# Patient Record
Sex: MALE | Race: BLACK OR AFRICAN AMERICAN | Employment: FULL TIME | URBAN - METROPOLITAN AREA
[De-identification: names, ages, dates, MRNs, and addresses within clinical notes are randomized per-mention and may not be internally consistent; named-entity substitution may affect disease eponyms.]

---

## 2017-02-06 ENCOUNTER — HOSPITAL ENCOUNTER (EMERGENCY)
Age: 42
Discharge: HOME | End: 2017-02-06
Attending: EMERGENCY MEDICINE
Payer: SELF-PAY

## 2017-02-06 ENCOUNTER — APPOINTMENT (OUTPATIENT)
Dept: GENERAL RADIOLOGY | Age: 42
End: 2017-02-06
Attending: EMERGENCY MEDICINE
Payer: SELF-PAY

## 2017-02-06 VITALS
BODY MASS INDEX: 54.12 KG/M2 | OXYGEN SATURATION: 98 % | HEART RATE: 91 BPM | SYSTOLIC BLOOD PRESSURE: 151 MMHG | DIASTOLIC BLOOD PRESSURE: 92 MMHG | RESPIRATION RATE: 18 BRPM | WEIGHT: 294.09 LBS | HEIGHT: 62 IN | TEMPERATURE: 98.4 F

## 2017-02-06 DIAGNOSIS — R42 VERTIGO: ICD-10-CM

## 2017-02-06 DIAGNOSIS — R09.81 SINUS CONGESTION: ICD-10-CM

## 2017-02-06 DIAGNOSIS — J20.9 ACUTE BRONCHITIS, UNSPECIFIED ORGANISM: Primary | ICD-10-CM

## 2017-02-06 LAB
ALBUMIN SERPL BCP-MCNC: 4.3 G/DL (ref 3.5–5)
ALBUMIN/GLOB SERPL: 1.3 {RATIO} (ref 1.1–2.2)
ALP SERPL-CCNC: 70 U/L (ref 45–117)
ALT SERPL-CCNC: 31 U/L (ref 12–78)
ANION GAP BLD CALC-SCNC: 8 MMOL/L (ref 5–15)
AST SERPL W P-5'-P-CCNC: 18 U/L (ref 15–37)
ATRIAL RATE: 86 BPM
BASOPHILS # BLD AUTO: 0 K/UL (ref 0–0.1)
BASOPHILS # BLD: 0 % (ref 0–1)
BILIRUB SERPL-MCNC: 0.3 MG/DL (ref 0.2–1)
BUN SERPL-MCNC: 9 MG/DL (ref 6–20)
BUN/CREAT SERPL: 8 (ref 12–20)
CALCIUM SERPL-MCNC: 8.7 MG/DL (ref 8.5–10.1)
CALCULATED P AXIS, ECG09: 44 DEGREES
CALCULATED R AXIS, ECG10: -54 DEGREES
CALCULATED T AXIS, ECG11: 30 DEGREES
CHLORIDE SERPL-SCNC: 105 MMOL/L (ref 97–108)
CO2 SERPL-SCNC: 27 MMOL/L (ref 21–32)
CREAT SERPL-MCNC: 1.15 MG/DL (ref 0.7–1.3)
DIAGNOSIS, 93000: NORMAL
EOSINOPHIL # BLD: 0.1 K/UL (ref 0–0.4)
EOSINOPHIL NFR BLD: 1 % (ref 0–7)
ERYTHROCYTE [DISTWIDTH] IN BLOOD BY AUTOMATED COUNT: 13.9 % (ref 11.5–14.5)
GLOBULIN SER CALC-MCNC: 3.3 G/DL (ref 2–4)
GLUCOSE SERPL-MCNC: 90 MG/DL (ref 65–100)
HCT VFR BLD AUTO: 45.3 % (ref 36.6–50.3)
HGB BLD-MCNC: 14.6 G/DL (ref 12.1–17)
LYMPHOCYTES # BLD AUTO: 29 % (ref 12–49)
LYMPHOCYTES # BLD: 1.4 K/UL (ref 0.8–3.5)
MAGNESIUM SERPL-MCNC: 2.4 MG/DL (ref 1.6–2.4)
MCH RBC QN AUTO: 29.6 PG (ref 26–34)
MCHC RBC AUTO-ENTMCNC: 32.2 G/DL (ref 30–36.5)
MCV RBC AUTO: 91.7 FL (ref 80–99)
MONOCYTES # BLD: 0.5 K/UL (ref 0–1)
MONOCYTES NFR BLD AUTO: 10 % (ref 5–13)
NEUTS SEG # BLD: 3 K/UL (ref 1.8–8)
NEUTS SEG NFR BLD AUTO: 60 % (ref 32–75)
P-R INTERVAL, ECG05: 170 MS
PLATELET # BLD AUTO: 212 K/UL (ref 150–400)
POTASSIUM SERPL-SCNC: 4.2 MMOL/L (ref 3.5–5.1)
PROT SERPL-MCNC: 7.6 G/DL (ref 6.4–8.2)
Q-T INTERVAL, ECG07: 364 MS
QRS DURATION, ECG06: 100 MS
QTC CALCULATION (BEZET), ECG08: 435 MS
RBC # BLD AUTO: 4.94 M/UL (ref 4.1–5.7)
SODIUM SERPL-SCNC: 140 MMOL/L (ref 136–145)
VENTRICULAR RATE, ECG03: 86 BPM
WBC # BLD AUTO: 4.9 K/UL (ref 4.1–11.1)

## 2017-02-06 PROCEDURE — 99283 EMERGENCY DEPT VISIT LOW MDM: CPT

## 2017-02-06 PROCEDURE — 96360 HYDRATION IV INFUSION INIT: CPT

## 2017-02-06 PROCEDURE — 83735 ASSAY OF MAGNESIUM: CPT | Performed by: EMERGENCY MEDICINE

## 2017-02-06 PROCEDURE — 71020 XR CHEST PA LAT: CPT

## 2017-02-06 PROCEDURE — 85025 COMPLETE CBC W/AUTO DIFF WBC: CPT | Performed by: EMERGENCY MEDICINE

## 2017-02-06 PROCEDURE — 36415 COLL VENOUS BLD VENIPUNCTURE: CPT | Performed by: EMERGENCY MEDICINE

## 2017-02-06 PROCEDURE — 74011250636 HC RX REV CODE- 250/636: Performed by: EMERGENCY MEDICINE

## 2017-02-06 PROCEDURE — 74011250637 HC RX REV CODE- 250/637: Performed by: EMERGENCY MEDICINE

## 2017-02-06 PROCEDURE — 93005 ELECTROCARDIOGRAM TRACING: CPT

## 2017-02-06 PROCEDURE — 80053 COMPREHEN METABOLIC PANEL: CPT | Performed by: EMERGENCY MEDICINE

## 2017-02-06 PROCEDURE — 96361 HYDRATE IV INFUSION ADD-ON: CPT

## 2017-02-06 RX ORDER — MECLIZINE HCL 12.5 MG 12.5 MG/1
25 TABLET ORAL
Status: COMPLETED | OUTPATIENT
Start: 2017-02-06 | End: 2017-02-06

## 2017-02-06 RX ORDER — MECLIZINE HYDROCHLORIDE 25 MG/1
25 TABLET ORAL
Qty: 20 TAB | Refills: 0 | Status: SHIPPED | OUTPATIENT
Start: 2017-02-06 | End: 2018-07-01

## 2017-02-06 RX ORDER — PSEUDOEPHEDRINE HCL 120 MG/1
120 TABLET, FILM COATED, EXTENDED RELEASE ORAL
Qty: 30 TAB | Refills: 0 | Status: SHIPPED | OUTPATIENT
Start: 2017-02-06 | End: 2018-07-01

## 2017-02-06 RX ADMIN — SODIUM CHLORIDE 1000 ML: 900 INJECTION, SOLUTION INTRAVENOUS at 12:30

## 2017-02-06 RX ADMIN — MECLIZINE 25 MG: 12.5 TABLET ORAL at 12:30

## 2017-02-06 NOTE — DISCHARGE INSTRUCTIONS
Bronchitis: Care Instructions  Your Care Instructions    Bronchitis is inflammation of the bronchial tubes, which carry air to the lungs. The tubes swell and produce mucus, or phlegm. The mucus and inflamed bronchial tubes make you cough. You may have trouble breathing. Most cases of bronchitis are caused by viruses like those that cause colds. Antibiotics usually do not help and they may be harmful. Bronchitis usually develops rapidly and lasts about 2 to 3 weeks in otherwise healthy people. Follow-up care is a key part of your treatment and safety. Be sure to make and go to all appointments, and call your doctor if you are having problems. It's also a good idea to know your test results and keep a list of the medicines you take. How can you care for yourself at home? · Take all medicines exactly as prescribed. Call your doctor if you think you are having a problem with your medicine. · Get some extra rest.  · Take an over-the-counter pain medicine, such as acetaminophen (Tylenol), ibuprofen (Advil, Motrin), or naproxen (Aleve) to reduce fever and relieve body aches. Read and follow all instructions on the label. · Do not take two or more pain medicines at the same time unless the doctor told you to. Many pain medicines have acetaminophen, which is Tylenol. Too much acetaminophen (Tylenol) can be harmful. · Take an over-the-counter cough medicine that contains dextromethorphan to help quiet a dry, hacking cough so that you can sleep. Avoid cough medicines that have more than one active ingredient. Read and follow all instructions on the label. · Breathe moist air from a humidifier, hot shower, or sink filled with hot water. The heat and moisture will thin mucus so you can cough it out. · Do not smoke. Smoking can make bronchitis worse. If you need help quitting, talk to your doctor about stop-smoking programs and medicines. These can increase your chances of quitting for good.   When should you call for help? Call 911 anytime you think you may need emergency care. For example, call if:  · You have severe trouble breathing. Call your doctor now or seek immediate medical care if:  · You have new or worse trouble breathing. · You cough up dark brown or bloody mucus (sputum). · You have a new or higher fever. · You have a new rash. Watch closely for changes in your health, and be sure to contact your doctor if:  · You cough more deeply or more often, especially if you notice more mucus or a change in the color of your mucus. · You are not getting better as expected. Where can you learn more? Go to http://darrian-kate.info/. Enter H333 in the search box to learn more about \"Bronchitis: Care Instructions. \"  Current as of: May 23, 2016  Content Version: 11.1  © 5267-5340 [employer]. Care instructions adapted under license by [employer] (which disclaims liability or warranty for this information). If you have questions about a medical condition or this instruction, always ask your healthcare professional. [address] any warranty or liability for your use of this information.

## 2017-02-06 NOTE — ED PROVIDER NOTES
HPI Comments: Annie Pulliam is a 37 y.o. male who presents ambulatory to [address] ED with cc of intermittent, persistent \"room spinning\" dizziness x 3 weeks. Pt reports his symptoms are worse at night as well as when he closes his eyes and not exacerbated with head movements. Pt additionally c/o diffuse headache, sinus congestion, R ear pain, BL ear congestion, productive cough, sore throat, and chest pain which has all been ongoing x 3 weeks. Pt reports he has been taking OTC Excedrin migraine and Mucinex, with no relief. Pt did not have his influenza vaccine this year. Pt denies h/o vertigo. Pt denies myalgias, nausea, vomiting, fever, or tinnitus. PCP: Giovana Sandoval MD    Social Hx: +tobacco (0.5 ppd), -EtOH, +illicit drug usage (marijuana)  Family Hx: DM, HTN    There are no other complaints, changes or physical findings at this time. The history is provided by the patient. History reviewed. No pertinent past medical history. History reviewed. No pertinent past surgical history. History reviewed. No pertinent family history. Social History     Social History    Marital status:      Spouse name: N/A    Number of children: N/A    Years of education: N/A     Occupational History    Not on file. Social History Main Topics    Smoking status: Current Every Day Smoker    Smokeless tobacco: Not on file    Alcohol use No    Drug use: 5.00 per week     Special: Marijuana    Sexual activity: Not on file     Other Topics Concern    Not on file     Social History Narrative         ALLERGIES: Review of patient's allergies indicates no known allergies. Review of Systems   Constitutional: Negative for chills and fever. HENT: Positive for congestion (ears, sinuses), ear pain (right) and sore throat. Negative for tinnitus. Respiratory: Positive for cough. Negative for shortness of breath. Cardiovascular: Positive for chest pain.    Gastrointestinal: Negative for abdominal pain, constipation, diarrhea, nausea and vomiting. Genitourinary: Negative for dysuria. Musculoskeletal: Negative for myalgias. Neurological: Positive for dizziness and headaches. Negative for weakness and numbness. All other systems reviewed and are negative. Vitals:    02/06/17 1148   BP: (!) 151/92   Pulse: 91   Resp: 18   Temp: 98.4 °F (36.9 °C)   SpO2: 98%   Weight: 133.4 kg (294 lb 1.5 oz)   Height: 5' 2\" (1.575 m)            Physical Exam   Constitutional: He is oriented to person, place, and time. He appears well-developed and well-nourished. HENT:   Head: Normocephalic. L TM clear  R TM hard to visualize however no erythema or fluid in the ear canal  Pt sounds congested   Eyes: EOM are normal. Pupils are equal, round, and reactive to light. Neck: Normal range of motion. Cardiovascular: Normal rate and regular rhythm. Pulmonary/Chest: Effort normal and breath sounds normal.   Abdominal: Soft. He exhibits no distension. There is no tenderness. Neurological: He is alert and oriented to person, place, and time. No cranial nerve deficit. CN 2-12 intact, Finger to nose intact, negative Romberg, normal gait   Skin: Skin is warm and dry. Psychiatric: He has a normal mood and affect. Nursing note and vitals reviewed. MDM  Number of Diagnoses or Management Options  Acute bronchitis, unspecified organism:   Sinus congestion:   Vertigo:   Diagnosis management comments: Patient presents with vertigo. Most likely peripheral vertigo vs. Central vertigo. DDx: BPPV, acute labyrinthitis, vestibular neuritis, Meniere's dx, otitis media, acoustic neuroma. Likely due to sinus congestion. 12:15 PM  Michael Gave Aly's  results have been reviewed with him. He has been counseled regarding his diagnosis. He verbally conveys understanding and agreement of the signs, symptoms, diagnosis, treatment with Meclizine and prognosis and additionally agrees to follow up as recommended with Neurology PRN.     He also agrees with the care-plan and conveys that all of his questions have been answered. I have also put together some discharge instructions for him that include: 1) educational information regarding their diagnosis, 2) how to care for their diagnosis at home, as well a 3) list of reasons why they would want to return to the ED prior to their follow-up appointment, should their condition change. Amount and/or Complexity of Data Reviewed  Clinical lab tests: ordered and reviewed  Tests in the radiology section of CPT®: ordered and reviewed  Tests in the medicine section of CPT®: reviewed and ordered  Review and summarize past medical records: yes  Independent visualization of images, tracings, or specimens: yes    Patient Progress  Patient progress: stable    ED Course       Procedures    EKG interpretation: (Preliminary) 11:55 AM  Rhythm: normal sinus rhythm; and regular . Rate (approx.): 86; Axis: normal; P wave: normal; QRS interval: normal ; ST/T wave: normal;   Written by Aissatou Reyes ED Scribe, as dictated by Juan Graham MD.      PROGRESS NOTE:  12:12 PM  Discussed the risks of smoking and the benefits of smoking cessation as well as the long term sequelae of smoking with the pt. The patient verbalized their understanding.    Written by Aissatou Reyes ED Scribe, as dictated by Juan Graham MD.      LABORATORY TESTS:  Recent Results (from the past 12 hour(s))   EKG, 12 LEAD, INITIAL    Collection Time: 02/06/17 11:55 AM   Result Value Ref Range    Ventricular Rate 86 BPM    Atrial Rate 86 BPM    P-R Interval 170 ms    QRS Duration 100 ms    Q-T Interval 364 ms    QTC Calculation (Bezet) 435 ms    Calculated P Axis 44 degrees    Calculated R Axis -54 degrees    Calculated T Axis 30 degrees    Diagnosis       Normal sinus rhythm  Left anterior fascicular block  Abnormal ECG  No previous ECGs available     CBC WITH AUTOMATED DIFF    Collection Time: 02/06/17 12:15 PM   Result Value Ref Range    WBC 4.9 4.1 - 11.1 K/uL    RBC 4.94 4.10 - 5.70 M/uL    HGB 14.6 12.1 - 17.0 g/dL    HCT 45.3 36.6 - 50.3 %    MCV 91.7 80.0 - 99.0 FL    MCH 29.6 26.0 - 34.0 PG    MCHC 32.2 30.0 - 36.5 g/dL    RDW 13.9 11.5 - 14.5 %    PLATELET 095 405 - 401 K/uL    NEUTROPHILS 60 32 - 75 %    LYMPHOCYTES 29 12 - 49 %    MONOCYTES 10 5 - 13 %    EOSINOPHILS 1 0 - 7 %    BASOPHILS 0 0 - 1 %    ABS. NEUTROPHILS 3.0 1.8 - 8.0 K/UL    ABS. LYMPHOCYTES 1.4 0.8 - 3.5 K/UL    ABS. MONOCYTES 0.5 0.0 - 1.0 K/UL    ABS. EOSINOPHILS 0.1 0.0 - 0.4 K/UL    ABS. BASOPHILS 0.0 0.0 - 0.1 K/UL   METABOLIC PANEL, COMPREHENSIVE    Collection Time: 02/06/17 12:15 PM   Result Value Ref Range    Sodium 140 136 - 145 mmol/L    Potassium 4.2 3.5 - 5.1 mmol/L    Chloride 105 97 - 108 mmol/L    CO2 27 21 - 32 mmol/L    Anion gap 8 5 - 15 mmol/L    Glucose 90 65 - 100 mg/dL    BUN 9 6 - 20 MG/DL    Creatinine 1.15 0.70 - 1.30 MG/DL    BUN/Creatinine ratio 8 (L) 12 - 20      GFR est AA >60 >60 ml/min/1.73m2    GFR est non-AA >60 >60 ml/min/1.73m2    Calcium 8.7 8.5 - 10.1 MG/DL    Bilirubin, total 0.3 0.2 - 1.0 MG/DL    ALT (SGPT) 31 12 - 78 U/L    AST (SGOT) 18 15 - 37 U/L    Alk. phosphatase 70 45 - 117 U/L    Protein, total 7.6 6.4 - 8.2 g/dL    Albumin 4.3 3.5 - 5.0 g/dL    Globulin 3.3 2.0 - 4.0 g/dL    A-G Ratio 1.3 1.1 - 2.2     MAGNESIUM    Collection Time: 02/06/17 12:15 PM   Result Value Ref Range    Magnesium 2.4 1.6 - 2.4 mg/dL       IMAGING RESULTS:  CXR Results  (Last 48 hours)               02/06/17 1224  XR CHEST PA LAT Final result    Impression:  Impression: No acute process. Narrative:  Exam:  2 view chest       Indication: Cough, dizziness x3 weeks. COMPARISON: None       PA and lateral views demonstrate normal heart size. The patient is on a cardiac   monitor. There is no acute process in the lung fields. The osseous structures   are unremarkable.                  MEDICATIONS GIVEN:  Medications   sodium chloride 0.9 % bolus infusion 1,000 mL (1,000 mL IntraVENous New Bag 2/6/17 1230)   meclizine (ANTIVERT) tablet 25 mg (25 mg Oral Given 2/6/17 1230)       IMPRESSION:  1. Acute bronchitis, unspecified organism    2. Sinus congestion    3. Vertigo        PLAN:  Current Discharge Medication List      START taking these medications    Details   meclizine (ANTIVERT) 25 mg tablet Take 1 Tab by mouth three (3) times daily as needed for Dizziness. Qty: 20 Tab, Refills: 0      pseudoephedrine CR (SUDAFED 12 HOUR) 120 mg CR tablet Take 1 Tab by mouth two (2) times daily as needed for Congestion. Qty: 30 Tab, Refills: 0           Follow-up Information     Follow up With Details Comments Contact Info    Emmy Hernandez MD  If symptoms worsen [address] [address]  [phone]          Return to ED if worse     DISCHARGE NOTE:  12:59 PM  Pt has been reexamined. Pt has no new complaints, changes, or physical findings. Care plan outlined and precautions discussed. All available results reviewed with pt. All medications reviewed with pt. All of pts questions and concerns addressed. Pt agrees to f/u as instructed and agrees to return to ED upon further deterioration. Pt is ready to go home. ATTESTATION:  This note is prepared by La Collier, acting as Scribe for Anny Kent MD.    Anny Kent MD and personally reviewed by me in its entirety. I confirm that the note above accurately reflects all work, treatment, procedures, and medical decision making performed by me.

## 2017-02-06 NOTE — ED NOTES
Patient ambulatory to ED room with complaint of dizziness that \"feels like the room is spinning\", intermittently for the past two months

## 2017-09-11 ENCOUNTER — HOSPITAL ENCOUNTER (EMERGENCY)
Age: 42
Discharge: LEFT BEFORE DISCHARGE | End: 2017-09-11
Attending: EMERGENCY MEDICINE
Payer: MEDICAID

## 2017-09-11 VITALS
SYSTOLIC BLOOD PRESSURE: 130 MMHG | HEIGHT: 62 IN | HEART RATE: 92 BPM | BODY MASS INDEX: 56.03 KG/M2 | WEIGHT: 304.45 LBS | TEMPERATURE: 98.2 F | RESPIRATION RATE: 18 BRPM | DIASTOLIC BLOOD PRESSURE: 69 MMHG | OXYGEN SATURATION: 98 %

## 2017-09-11 LAB
ALBUMIN SERPL-MCNC: 4 G/DL (ref 3.5–5)
ALBUMIN/GLOB SERPL: 1.2 {RATIO} (ref 1.1–2.2)
ALP SERPL-CCNC: 68 U/L (ref 45–117)
ALT SERPL-CCNC: 30 U/L (ref 12–78)
ANION GAP SERPL CALC-SCNC: 4 MMOL/L (ref 5–15)
APTT PPP: 33.6 SEC (ref 22.1–32.5)
AST SERPL-CCNC: 22 U/L (ref 15–37)
BASOPHILS # BLD: 0 K/UL (ref 0–0.1)
BASOPHILS NFR BLD: 0 % (ref 0–1)
BILIRUB SERPL-MCNC: 0.4 MG/DL (ref 0.2–1)
BUN SERPL-MCNC: 8 MG/DL (ref 6–20)
BUN/CREAT SERPL: 6 (ref 12–20)
CALCIUM SERPL-MCNC: 8.7 MG/DL (ref 8.5–10.1)
CHLORIDE SERPL-SCNC: 103 MMOL/L (ref 97–108)
CO2 SERPL-SCNC: 30 MMOL/L (ref 21–32)
CREAT SERPL-MCNC: 1.34 MG/DL (ref 0.7–1.3)
EOSINOPHIL # BLD: 0.1 K/UL (ref 0–0.4)
EOSINOPHIL NFR BLD: 2 % (ref 0–7)
ERYTHROCYTE [DISTWIDTH] IN BLOOD BY AUTOMATED COUNT: 13.9 % (ref 11.5–14.5)
GLOBULIN SER CALC-MCNC: 3.4 G/DL (ref 2–4)
GLUCOSE SERPL-MCNC: 109 MG/DL (ref 65–100)
HCT VFR BLD AUTO: 45 % (ref 36.6–50.3)
HGB BLD-MCNC: 14.4 G/DL (ref 12.1–17)
INR PPP: 1 (ref 0.9–1.1)
LYMPHOCYTES # BLD: 1.6 K/UL (ref 0.8–3.5)
LYMPHOCYTES NFR BLD: 36 % (ref 12–49)
MCH RBC QN AUTO: 29.1 PG (ref 26–34)
MCHC RBC AUTO-ENTMCNC: 32 G/DL (ref 30–36.5)
MCV RBC AUTO: 90.9 FL (ref 80–99)
MONOCYTES # BLD: 0.3 K/UL (ref 0–1)
MONOCYTES NFR BLD: 6 % (ref 5–13)
NEUTS SEG # BLD: 2.5 K/UL (ref 1.8–8)
NEUTS SEG NFR BLD: 56 % (ref 32–75)
PLATELET # BLD AUTO: 218 K/UL (ref 150–400)
POTASSIUM SERPL-SCNC: 4.2 MMOL/L (ref 3.5–5.1)
PROT SERPL-MCNC: 7.4 G/DL (ref 6.4–8.2)
PROTHROMBIN TIME: 9.9 SEC (ref 9–11.1)
RBC # BLD AUTO: 4.95 M/UL (ref 4.1–5.7)
SODIUM SERPL-SCNC: 137 MMOL/L (ref 136–145)
THERAPEUTIC RANGE,PTTT: ABNORMAL SECS (ref 58–77)
WBC # BLD AUTO: 4.4 K/UL (ref 4.1–11.1)

## 2017-09-11 PROCEDURE — 80053 COMPREHEN METABOLIC PANEL: CPT | Performed by: EMERGENCY MEDICINE

## 2017-09-11 PROCEDURE — 75810000275 HC EMERGENCY DEPT VISIT NO LEVEL OF CARE

## 2017-09-11 PROCEDURE — 85730 THROMBOPLASTIN TIME PARTIAL: CPT | Performed by: EMERGENCY MEDICINE

## 2017-09-11 PROCEDURE — 85025 COMPLETE CBC W/AUTO DIFF WBC: CPT | Performed by: EMERGENCY MEDICINE

## 2017-09-11 PROCEDURE — 85610 PROTHROMBIN TIME: CPT | Performed by: EMERGENCY MEDICINE

## 2017-09-11 PROCEDURE — 36415 COLL VENOUS BLD VENIPUNCTURE: CPT | Performed by: EMERGENCY MEDICINE

## 2017-09-11 NOTE — ED TRIAGE NOTES
Pt to triage stating he needs to leave to  his child, explained that a doctor would see him as soon as possible, did not want to wait to speak with provider, reports he will come back \"later\"

## 2018-02-07 ENCOUNTER — HOSPITAL ENCOUNTER (OUTPATIENT)
Dept: NON INVASIVE DIAGNOSTICS | Age: 43
Discharge: HOME | End: 2018-02-07
Attending: PHYSICIAN ASSISTANT
Payer: MEDICAID

## 2018-02-07 DIAGNOSIS — R06.02 SHORTNESS OF BREATH: ICD-10-CM

## 2018-02-07 PROCEDURE — 93306 TTE W/DOPPLER COMPLETE: CPT

## 2018-07-01 ENCOUNTER — HOSPITAL ENCOUNTER (EMERGENCY)
Age: 43
Discharge: HOME | End: 2018-07-02
Attending: EMERGENCY MEDICINE
Payer: MEDICAID

## 2018-07-01 VITALS
WEIGHT: 307.1 LBS | BODY MASS INDEX: 57.98 KG/M2 | HEIGHT: 61 IN | HEART RATE: 91 BPM | DIASTOLIC BLOOD PRESSURE: 64 MMHG | TEMPERATURE: 98.6 F | OXYGEN SATURATION: 98 % | SYSTOLIC BLOOD PRESSURE: 114 MMHG | RESPIRATION RATE: 20 BRPM

## 2018-07-01 DIAGNOSIS — R60.0 EDEMA OF LOWER EXTREMITY: ICD-10-CM

## 2018-07-01 DIAGNOSIS — N28.9 RENAL INSUFFICIENCY: Primary | ICD-10-CM

## 2018-07-01 DIAGNOSIS — R74.8 ELEVATED CK: ICD-10-CM

## 2018-07-01 LAB
ALBUMIN SERPL-MCNC: 3.7 G/DL (ref 3.5–5)
ALBUMIN/GLOB SERPL: 1.1 {RATIO} (ref 1.1–2.2)
ALP SERPL-CCNC: 72 U/L (ref 45–117)
ALT SERPL-CCNC: 29 U/L (ref 12–78)
ANION GAP SERPL CALC-SCNC: 9 MMOL/L (ref 5–15)
APPEARANCE UR: CLEAR
AST SERPL-CCNC: 18 U/L (ref 15–37)
BACTERIA URNS QL MICRO: NEGATIVE /HPF
BASOPHILS # BLD: 0 K/UL (ref 0–0.1)
BASOPHILS NFR BLD: 0 % (ref 0–1)
BILIRUB SERPL-MCNC: 0.2 MG/DL (ref 0.2–1)
BILIRUB UR QL: NEGATIVE
BNP SERPL-MCNC: <10 PG/ML (ref 0–125)
BUN SERPL-MCNC: 12 MG/DL (ref 6–20)
BUN/CREAT SERPL: 9 (ref 12–20)
CALCIUM SERPL-MCNC: 9.1 MG/DL (ref 8.5–10.1)
CHLORIDE SERPL-SCNC: 105 MMOL/L (ref 97–108)
CO2 SERPL-SCNC: 26 MMOL/L (ref 21–32)
COLOR UR: ABNORMAL
CREAT SERPL-MCNC: 1.4 MG/DL (ref 0.7–1.3)
D DIMER PPP FEU-MCNC: 0.21 MG/L FEU (ref 0–0.65)
DIFFERENTIAL METHOD BLD: NORMAL
EOSINOPHIL # BLD: 0.1 K/UL (ref 0–0.4)
EOSINOPHIL NFR BLD: 3 % (ref 0–7)
EPITH CASTS URNS QL MICRO: ABNORMAL /LPF
ERYTHROCYTE [DISTWIDTH] IN BLOOD BY AUTOMATED COUNT: 13.6 % (ref 11.5–14.5)
GLOBULIN SER CALC-MCNC: 3.3 G/DL (ref 2–4)
GLUCOSE SERPL-MCNC: 131 MG/DL (ref 65–100)
GLUCOSE UR STRIP.AUTO-MCNC: NEGATIVE MG/DL
HCT VFR BLD AUTO: 41.8 % (ref 36.6–50.3)
HGB BLD-MCNC: 13.6 G/DL (ref 12.1–17)
HGB UR QL STRIP: ABNORMAL
HYALINE CASTS URNS QL MICRO: ABNORMAL /LPF (ref 0–5)
IMM GRANULOCYTES # BLD: 0 K/UL (ref 0–0.04)
IMM GRANULOCYTES NFR BLD AUTO: 0 % (ref 0–0.5)
KETONES UR QL STRIP.AUTO: NEGATIVE MG/DL
LACTATE SERPL-SCNC: 1.2 MMOL/L (ref 0.4–2)
LEUKOCYTE ESTERASE UR QL STRIP.AUTO: NEGATIVE
LYMPHOCYTES # BLD: 1.6 K/UL (ref 0.8–3.5)
LYMPHOCYTES NFR BLD: 34 % (ref 12–49)
MCH RBC QN AUTO: 29.8 PG (ref 26–34)
MCHC RBC AUTO-ENTMCNC: 32.5 G/DL (ref 30–36.5)
MCV RBC AUTO: 91.7 FL (ref 80–99)
MONOCYTES # BLD: 0.3 K/UL (ref 0–1)
MONOCYTES NFR BLD: 7 % (ref 5–13)
NEUTS SEG # BLD: 2.6 K/UL (ref 1.8–8)
NEUTS SEG NFR BLD: 57 % (ref 32–75)
NITRITE UR QL STRIP.AUTO: NEGATIVE
NRBC # BLD: 0 K/UL (ref 0–0.01)
NRBC BLD-RTO: 0 PER 100 WBC
PH UR STRIP: 5.5 [PH] (ref 5–8)
PLATELET # BLD AUTO: 221 K/UL (ref 150–400)
PMV BLD AUTO: 9.6 FL (ref 8.9–12.9)
POTASSIUM SERPL-SCNC: 3.8 MMOL/L (ref 3.5–5.1)
PROT SERPL-MCNC: 7 G/DL (ref 6.4–8.2)
PROT UR STRIP-MCNC: NEGATIVE MG/DL
RBC # BLD AUTO: 4.56 M/UL (ref 4.1–5.7)
RBC #/AREA URNS HPF: ABNORMAL /HPF (ref 0–5)
SODIUM SERPL-SCNC: 140 MMOL/L (ref 136–145)
SP GR UR REFRACTOMETRY: 1.03 (ref 1–1.03)
UA: UC IF INDICATED,UAUC: ABNORMAL
UROBILINOGEN UR QL STRIP.AUTO: 1 EU/DL (ref 0.2–1)
WBC # BLD AUTO: 4.6 K/UL (ref 4.1–11.1)
WBC URNS QL MICRO: ABNORMAL /HPF (ref 0–4)

## 2018-07-01 PROCEDURE — 85025 COMPLETE CBC W/AUTO DIFF WBC: CPT | Performed by: EMERGENCY MEDICINE

## 2018-07-01 PROCEDURE — 83605 ASSAY OF LACTIC ACID: CPT | Performed by: EMERGENCY MEDICINE

## 2018-07-01 PROCEDURE — 83880 ASSAY OF NATRIURETIC PEPTIDE: CPT | Performed by: EMERGENCY MEDICINE

## 2018-07-01 PROCEDURE — 82550 ASSAY OF CK (CPK): CPT | Performed by: EMERGENCY MEDICINE

## 2018-07-01 PROCEDURE — 80053 COMPREHEN METABOLIC PANEL: CPT | Performed by: EMERGENCY MEDICINE

## 2018-07-01 PROCEDURE — 85379 FIBRIN DEGRADATION QUANT: CPT | Performed by: EMERGENCY MEDICINE

## 2018-07-01 PROCEDURE — 82553 CREATINE MB FRACTION: CPT | Performed by: EMERGENCY MEDICINE

## 2018-07-01 PROCEDURE — 36415 COLL VENOUS BLD VENIPUNCTURE: CPT | Performed by: EMERGENCY MEDICINE

## 2018-07-01 PROCEDURE — 81001 URINALYSIS AUTO W/SCOPE: CPT | Performed by: EMERGENCY MEDICINE

## 2018-07-01 PROCEDURE — 99283 EMERGENCY DEPT VISIT LOW MDM: CPT

## 2018-07-01 NOTE — LETTER
[address] 
\[facility]\"" EMERGENCY DEPT 
[address] [place] [address] [id number] [phone] Work/School Note Date: 7/1/2018 To Whom It May concern: Larence Heimlich was seen and treated today in the emergency room by the following provider(s): 
Attending Provider: Reshma Hernandez MD. Larence Heimlich may return to work on 7/3/18.  
 
Sincerely, 
 
 
 
 
Reshma Hernandez MD

## 2018-07-02 LAB
CK MB CFR SERPL CALC: 0.4 % (ref 0–2.5)
CK MB SERPL-MCNC: 1.7 NG/ML (ref 5–25)
CK SERPL-CCNC: 482 U/L (ref 39–308)

## 2018-07-02 PROCEDURE — 74011250637 HC RX REV CODE- 250/637: Performed by: EMERGENCY MEDICINE

## 2018-07-02 PROCEDURE — 74011250636 HC RX REV CODE- 250/636: Performed by: EMERGENCY MEDICINE

## 2018-07-02 PROCEDURE — 96360 HYDRATION IV INFUSION INIT: CPT

## 2018-07-02 RX ORDER — ACETAMINOPHEN 325 MG/1
TABLET ORAL
Status: DISCONTINUED
Start: 2018-07-02 | End: 2018-07-02 | Stop reason: HOSPADM

## 2018-07-02 RX ORDER — ACETAMINOPHEN 325 MG/1
650 TABLET ORAL
Status: COMPLETED | OUTPATIENT
Start: 2018-07-02 | End: 2018-07-02

## 2018-07-02 RX ADMIN — SODIUM CHLORIDE 1000 ML: 900 INJECTION, SOLUTION INTRAVENOUS at 00:55

## 2018-07-02 RX ADMIN — ACETAMINOPHEN 650 MG: 325 TABLET ORAL at 01:22

## 2018-07-02 NOTE — DISCHARGE INSTRUCTIONS
Leg and Ankle Edema: Care Instructions  Your Care Instructions  Swelling in the legs, ankles, and feet is called edema. It is common after you sit or stand for a while. Long plane flights or car rides often cause swelling in the legs and feet. You may also have swelling if you have to stand for long periods of time at your job. Problems with the veins in the legs (varicose veins) and changes in hormones can also cause swelling. Sometimes the swelling in the ankles and feet is caused by a more serious problem, such as heart failure, infection, blood clots, or liver or kidney disease. Follow-up care is a key part of your treatment and safety. Be sure to make and go to all appointments, and call your doctor if you are having problems. It's also a good idea to know your test results and keep a list of the medicines you take. How can you care for yourself at home? · If your doctor gave you medicine, take it as prescribed. Call your doctor if you think you are having a problem with your medicine. · Whenever you are resting, raise your legs up. Try to keep the swollen area higher than the level of your heart. · Take breaks from standing or sitting in one position. ¨ Walk around to increase the blood flow in your lower legs. ¨ Move your feet and ankles often while you stand, or tighten and relax your leg muscles. · Wear support stockings. Put them on in the morning, before swelling gets worse. · Eat a balanced diet. Lose weight if you need to. · Limit the amount of salt (sodium) in your diet. Salt holds fluid in the body and may increase swelling. When should you call for help? Call 911 anytime you think you may need emergency care. For example, call if:  ? · You have symptoms of a blood clot in your lung (called a pulmonary embolism). These may include:  ¨ Sudden chest pain. ¨ Trouble breathing. ¨ Coughing up blood.    ?Call your doctor now or seek immediate medical care if:  ? · You have signs of a blood clot, such as:  ¨ Pain in your calf, back of the knee, thigh, or groin. ¨ Redness and swelling in your leg or groin. ? · You have symptoms of infection, such as:  ¨ Increased pain, swelling, warmth, or redness. ¨ Red streaks or pus. ¨ A fever. ? Watch closely for changes in your health, and be sure to contact your doctor if:  ? · Your swelling is getting worse. ? · You have new or worsening pain in your legs. ? · You do not get better as expected. Where can you learn more? Go to http://darrian-kate.info/. Enter H624 in the search box to learn more about \"Leg and Ankle Edema: Care Instructions. \"  Current as of: March 20, 2017  Content Version: 11.4  © 2039-5724 [email or web address]. Care instructions adapted under license by [employer] (which disclaims liability or warranty for this information). If you have questions about a medical condition or this instruction, always ask your healthcare professional. [address] [address] any warranty or liability for your use of this information.

## 2018-07-02 NOTE — ED NOTES
Patient discharged by Dr. Tejas Epps. Patient provided with discharge instructions Rx and instructions on follow up care. Patient out of ED ambulatory accompanied by wife.

## 2018-07-02 NOTE — ED PROVIDER NOTES
EMERGENCY DEPARTMENT HISTORY AND PHYSICAL EXAM    Date: 7/1/2018  Patient Name: Cecille Encarnacion    History of Presenting Illness     Chief Complaint   Patient presents with    Leg Pain     started a few days ago, no hx of chf    Leg Swelling       History Provided By: Patient and Patient's Wife    HPI: Cecille Encarnacion is a 44 y.o. male, who presents ambulatory to the ED c/o gradually worsening, persistent, bilateral lower leg swelling and associated aching, right leg pain x1 week. Pt reports a hx of sleep apnea (not on CPAP at night) and shortness of breath at baseline. Wife at bedside states she has made several appointments with the pt's PCP's office, but the pt is non-compliant. He denies taking any medications for relief of symptoms or any other relieving or exacerbating factors. Pt denies any increased shortness of breath above baseline. Pt specifically denies any fever, congestion, cough, chest pain, abdominal pain, nausea, vomiting, diarrhea, dysuria, or urinary frequency. PCP: Subhash Baptiste MD    PMHx: Significant for sleep apnea  PSHx: Significant for none  Social Hx: +tobacco, -EtOH, +Illicit Drugs (Marijuana)    There are no other complaints, changes, or physical findings at this time. Past History     Past Medical History:  Past Medical History:   Diagnosis Date    Sleep apnea 2018       Past Surgical History:  History reviewed. No pertinent surgical history. Family History:  History reviewed. No pertinent family history. Social History:  Social History   Substance Use Topics    Smoking status: Current Every Day Smoker    Smokeless tobacco: Never Used    Alcohol use No       Allergies:  No Known Allergies      Review of Systems   Review of Systems   Constitutional: Negative for chills and fever. HENT: Negative. Eyes: Negative. Respiratory: Negative for cough, chest tightness and shortness of breath. Cardiovascular: Positive for leg swelling. Negative for chest pain. Gastrointestinal: Negative for abdominal pain, diarrhea, nausea and vomiting. Endocrine: Negative. Genitourinary: Negative for difficulty urinating and dysuria. Musculoskeletal: Positive for myalgias (RLE). Skin: Negative. Neurological: Negative. Psychiatric/Behavioral: Negative. All other systems reviewed and are negative. Physical Exam   Physical Exam   Constitutional: He is oriented to person, place, and time. He appears well-developed and well-nourished. No distress. HENT:   Head: Normocephalic and atraumatic. Nose: Nose normal.   Mouth/Throat: No oropharyngeal exudate. Eyes: Conjunctivae and EOM are normal. Pupils are equal, round, and reactive to light. Neck: Normal range of motion. Neck supple. No JVD present. Cardiovascular: Normal rate, regular rhythm, normal heart sounds and intact distal pulses. Exam reveals no friction rub. No murmur heard. Pulmonary/Chest: Effort normal and breath sounds normal. No stridor. No respiratory distress. He has no wheezes. He has no rales. Abdominal: Soft. Bowel sounds are normal. He exhibits no distension. There is no tenderness. There is no rebound. Musculoskeletal: Normal range of motion. He exhibits edema (1+ to mid calf, bilateral, soft complartment, NTTP). He exhibits no tenderness. Neurological: He is alert and oriented to person, place, and time. No cranial nerve deficit. Skin: Skin is warm and dry. No rash noted. He is not diaphoretic. Psychiatric: He has a normal mood and affect. His speech is normal and behavior is normal. Judgment and thought content normal. Cognition and memory are normal.   Nursing note and vitals reviewed.         Diagnostic Study Results     Labs -     Recent Results (from the past 12 hour(s))   CBC WITH AUTOMATED DIFF    Collection Time: 07/01/18  9:58 PM   Result Value Ref Range    WBC 4.6 4.1 - 11.1 K/uL    RBC 4.56 4.10 - 5.70 M/uL    HGB 13.6 12.1 - 17.0 g/dL    HCT 41.8 36.6 - 50.3 % MCV 91.7 80.0 - 99.0 FL    MCH 29.8 26.0 - 34.0 PG    MCHC 32.5 30.0 - 36.5 g/dL    RDW 13.6 11.5 - 14.5 %    PLATELET 008 030 - 897 K/uL    MPV 9.6 8.9 - 12.9 FL    NRBC 0.0 0  WBC    ABSOLUTE NRBC 0.00 0.00 - 0.01 K/uL    NEUTROPHILS 57 32 - 75 %    LYMPHOCYTES 34 12 - 49 %    MONOCYTES 7 5 - 13 %    EOSINOPHILS 3 0 - 7 %    BASOPHILS 0 0 - 1 %    IMMATURE GRANULOCYTES 0 0.0 - 0.5 %    ABS. NEUTROPHILS 2.6 1.8 - 8.0 K/UL    ABS. LYMPHOCYTES 1.6 0.8 - 3.5 K/UL    ABS. MONOCYTES 0.3 0.0 - 1.0 K/UL    ABS. EOSINOPHILS 0.1 0.0 - 0.4 K/UL    ABS. BASOPHILS 0.0 0.0 - 0.1 K/UL    ABS. IMM. GRANS. 0.0 0.00 - 0.04 K/UL    DF AUTOMATED     METABOLIC PANEL, COMPREHENSIVE    Collection Time: 07/01/18  9:58 PM   Result Value Ref Range    Sodium 140 136 - 145 mmol/L    Potassium 3.8 3.5 - 5.1 mmol/L    Chloride 105 97 - 108 mmol/L    CO2 26 21 - 32 mmol/L    Anion gap 9 5 - 15 mmol/L    Glucose 131 (H) 65 - 100 mg/dL    BUN 12 6 - 20 MG/DL    Creatinine 1.40 (H) 0.70 - 1.30 MG/DL    BUN/Creatinine ratio 9 (L) 12 - 20      GFR est AA >60 >60 ml/min/1.73m2    GFR est non-AA 56 (L) >60 ml/min/1.73m2    Calcium 9.1 8.5 - 10.1 MG/DL    Bilirubin, total 0.2 0.2 - 1.0 MG/DL    ALT (SGPT) 29 12 - 78 U/L    AST (SGOT) 18 15 - 37 U/L    Alk. phosphatase 72 45 - 117 U/L    Protein, total 7.0 6.4 - 8.2 g/dL    Albumin 3.7 3.5 - 5.0 g/dL    Globulin 3.3 2.0 - 4.0 g/dL    A-G Ratio 1.1 1.1 - 2.2     NT-PRO BNP    Collection Time: 07/01/18  9:58 PM   Result Value Ref Range    NT pro-BNP <10 0 - 125 PG/ML   LACTIC ACID    Collection Time: 07/01/18  9:58 PM   Result Value Ref Range    Lactic acid 1.2 0.4 - 2.0 MMOL/L   D DIMER    Collection Time: 07/01/18  9:58 PM   Result Value Ref Range    D-dimer 0.21 0.00 - 0.65 mg/L FEU   CK W/ REFLX CKMB    Collection Time: 07/01/18  9:58 PM   Result Value Ref Range     (H) 39 - 308 U/L   CK-MB,QUANT.     Collection Time: 07/01/18  9:58 PM   Result Value Ref Range    CK - MB 1.7 <3.6 NG/ML CK-MB Index 0.4 0 - 2.5     URINALYSIS W/ REFLEX CULTURE    Collection Time: 07/01/18 11:04 PM   Result Value Ref Range    Color YELLOW/STRAW      Appearance CLEAR CLEAR      Specific gravity 1.026 1.003 - 1.030      pH (UA) 5.5 5.0 - 8.0      Protein NEGATIVE  NEG mg/dL    Glucose NEGATIVE  NEG mg/dL    Ketone NEGATIVE  NEG mg/dL    Bilirubin NEGATIVE  NEG      Blood TRACE (A) NEG      Urobilinogen 1.0 0.2 - 1.0 EU/dL    Nitrites NEGATIVE  NEG      Leukocyte Esterase NEGATIVE  NEG      WBC 0-4 0 - 4 /hpf    RBC 0-5 0 - 5 /hpf    Epithelial cells FEW FEW /lpf    Bacteria NEGATIVE  NEG /hpf    UA:UC IF INDICATED CULTURE NOT INDICATED BY UA RESULT CNI      Hyaline cast 0-2 0 - 5 /lpf       Radiologic Studies -   No orders to display     CT Results  (Last 48 hours)    None        CXR Results  (Last 48 hours)    None            Medical Decision Making   I am the first provider for this patient. I reviewed the vital signs, available nursing notes, past medical history, past surgical history, family history and social history. Vital Signs-Reviewed the patient's vital signs. Patient Vitals for the past 12 hrs:   Temp Pulse Resp BP SpO2   07/01/18 2120 98.6 °F (37 °C) 91 20 114/64 98 %       Pulse Oximetry Analysis - 98% on RA    Cardiac Monitor:   Rate: 91 bpm  Rhythm: Normal Sinus Rhythm      Records Reviewed: Nursing Notes and Old Medical Records    Provider Notes (Medical Decision Making):     DDX:  Chf, electrolyte abnormality, kidney dysfunction, dependent edema    Plan:  Labs, bnp, ua, d dimer, ivf    Impression:  Elevated ck, renal insufficiency    ED Course:   Initial assessment performed. The patients presenting problems have been discussed, and they are in agreement with the care plan formulated and outlined with them. I have encouraged them to ask questions as they arise throughout their visit.     I reviewed our electronic medical record system for any past medical records that were available that may contribute to the patients current condition, the nursing notes and and vital signs from today's visit    Nursing notes will be reviewed as they become available in realtime while the pt has been in the ED. Kaylee Sweeney MD    I have spent 3-7 minutes discussing the medical risks of prolonged smoking habits and advised the patient of the benefits of the cessation of smoking, providing specific suggestions on how to quit. Kaylee Sweeney MD    PROGRESS NOTE:  1:27 AM  Pt reevaluated. Pt is noted to be ready for discharge. Does not wish to finish his fluids. Written by Shawnee Jiménez ED Scribe, as dictated by Kaylee Sweeney MD    1:46 AM  Progress note:  Pt noted to be ready for discharge. Discussed lab findings with pt and/or family, specifically noting elevated CK. Pt will follow up as instructed. All questions have been answered, pt voiced understanding and agreement with plan. If narcotics were prescribed, pt was advised not to drive or operate heavy machinery. If abx were prescribed, pt advised that diarrhea and rash are possible side effects of the medications. Specific return precautions provided in addition to instructions for pt to return to the ED immediately should sx worsen at any time. Kaylee Sweeney MD      Critical Care Time:     none    PLAN:  1. There are no discharge medications for this patient. 2.   Follow-up Information     Follow up With Details Comments Mel Chris MD Wen Schedule an appointment as soon as possible for a visit in 2 days  [address] [place] [address] [phone]          Return to ED if worse     Disposition:    1:45 AM   The patient's results have been reviewed with family and/or caregiver.  They verbally convey their understanding and agreement of the patient's signs, symptoms, diagnosis, treatment and prognosis and additionally agree to follow up as recommended in the discharge instructions or to return to the Emergency Room should the patient's condition change prior to their follow-up appointment. The family and/or caregiver verbally agrees with the care-plan and all of their questions have been answered. The discharge instructions have also been provided to the them with educational information regarding the patient's diagnosis as well a list of reasons why the patient would want to return to the ER prior to their follow-up appointment should their condition change. Benton Pendleton MD      Diagnosis     Clinical Impression:   1. Renal insufficiency    2. Elevated CK    3. Edema of lower extremity        Attestations: This note is prepared by Regine Bobo, acting as Scribe for MD Benton Taylor MD : The scribe's documentation has been prepared under my direction and personally reviewed by me in its entirety. I confirm that the note above accurately reflects all work, treatment, procedures, and medical decision making performed by me. This note will not be viewable in [address]

## 2018-07-02 NOTE — ED TRIAGE NOTES
Pt arrives to ed with complaints of left leg swelling and right leg pain, ongoing for 3-4 days.  Pt in position of comfort with call bell in reach and family at bedside

## 2018-07-15 ENCOUNTER — HOSPITAL ENCOUNTER (OUTPATIENT)
Dept: ULTRASOUND IMAGING | Age: 43
Discharge: HOME | End: 2018-07-15
Attending: NURSE PRACTITIONER
Payer: MEDICAID

## 2018-07-15 DIAGNOSIS — I80.239: ICD-10-CM

## 2018-07-15 PROCEDURE — 93971 EXTREMITY STUDY: CPT

## 2018-07-30 ENCOUNTER — TELEPHONE (OUTPATIENT)
Dept: SLEEP MEDICINE | Age: 43
End: 2018-07-30

## 2018-07-30 NOTE — TELEPHONE ENCOUNTER
ALICIA on 07/30/2018 for the patient to call the office to schedule an appointment for a sleep medicine consult per Javon Gutierrez NP at [facility].

## 2019-03-13 ENCOUNTER — HOSPITAL ENCOUNTER (OUTPATIENT)
Dept: MRI IMAGING | Age: 44
Discharge: HOME | End: 2019-03-13
Attending: FAMILY MEDICINE
Payer: MEDICAID

## 2019-03-13 DIAGNOSIS — M54.16 RIGHT LUMBAR RADICULOPATHY: ICD-10-CM

## 2019-03-13 PROCEDURE — 72148 MRI LUMBAR SPINE W/O DYE: CPT

## 2019-08-11 ENCOUNTER — APPOINTMENT (OUTPATIENT)
Dept: ULTRASOUND IMAGING | Age: 44
End: 2019-08-11
Attending: STUDENT IN AN ORGANIZED HEALTH CARE EDUCATION/TRAINING PROGRAM
Payer: MEDICAID

## 2019-08-11 ENCOUNTER — HOSPITAL ENCOUNTER (EMERGENCY)
Age: 44
Discharge: HOME | End: 2019-08-12
Attending: EMERGENCY MEDICINE
Payer: MEDICAID

## 2019-08-11 DIAGNOSIS — L03.115 CELLULITIS OF RIGHT LOWER EXTREMITY: Primary | ICD-10-CM

## 2019-08-11 LAB
ALBUMIN SERPL-MCNC: 3.8 G/DL (ref 3.5–5)
ALBUMIN/GLOB SERPL: 1 {RATIO} (ref 1.1–2.2)
ALP SERPL-CCNC: 67 U/L (ref 45–117)
ALT SERPL-CCNC: 32 U/L (ref 12–78)
ANION GAP SERPL CALC-SCNC: 0 MMOL/L (ref 5–15)
AST SERPL-CCNC: 24 U/L (ref 15–37)
BASOPHILS # BLD: 0 K/UL (ref 0–0.1)
BASOPHILS NFR BLD: 1 % (ref 0–1)
BILIRUB SERPL-MCNC: 0.2 MG/DL (ref 0.2–1)
BUN SERPL-MCNC: 12 MG/DL (ref 6–20)
BUN/CREAT SERPL: 9 (ref 12–20)
CALCIUM SERPL-MCNC: 8.8 MG/DL (ref 8.5–10.1)
CHLORIDE SERPL-SCNC: 103 MMOL/L (ref 97–108)
CO2 SERPL-SCNC: 34 MMOL/L (ref 21–32)
CREAT SERPL-MCNC: 1.32 MG/DL (ref 0.7–1.3)
DIFFERENTIAL METHOD BLD: NORMAL
EOSINOPHIL # BLD: 0.2 K/UL (ref 0–0.4)
EOSINOPHIL NFR BLD: 5 % (ref 0–7)
ERYTHROCYTE [DISTWIDTH] IN BLOOD BY AUTOMATED COUNT: 13.3 % (ref 11.5–14.5)
GLOBULIN SER CALC-MCNC: 3.9 G/DL (ref 2–4)
GLUCOSE SERPL-MCNC: 101 MG/DL (ref 65–100)
HCT VFR BLD AUTO: 44.6 % (ref 36.6–50.3)
HGB BLD-MCNC: 13.8 G/DL (ref 12.1–17)
IMM GRANULOCYTES # BLD AUTO: 0 K/UL (ref 0–0.04)
IMM GRANULOCYTES NFR BLD AUTO: 0 % (ref 0–0.5)
LYMPHOCYTES # BLD: 1.3 K/UL (ref 0.8–3.5)
LYMPHOCYTES NFR BLD: 28 % (ref 12–49)
MCH RBC QN AUTO: 29.1 PG (ref 26–34)
MCHC RBC AUTO-ENTMCNC: 30.9 G/DL (ref 30–36.5)
MCV RBC AUTO: 94.1 FL (ref 80–99)
MONOCYTES # BLD: 0.6 K/UL (ref 0–1)
MONOCYTES NFR BLD: 13 % (ref 5–13)
NEUTS SEG # BLD: 2.5 K/UL (ref 1.8–8)
NEUTS SEG NFR BLD: 53 % (ref 32–75)
NRBC # BLD: 0 K/UL (ref 0–0.01)
NRBC BLD-RTO: 0 PER 100 WBC
PLATELET # BLD AUTO: 225 K/UL (ref 150–400)
PMV BLD AUTO: 9.3 FL (ref 8.9–12.9)
POTASSIUM SERPL-SCNC: 4.3 MMOL/L (ref 3.5–5.1)
PROT SERPL-MCNC: 7.7 G/DL (ref 6.4–8.2)
RBC # BLD AUTO: 4.74 M/UL (ref 4.1–5.7)
SODIUM SERPL-SCNC: 137 MMOL/L (ref 136–145)
WBC # BLD AUTO: 4.7 K/UL (ref 4.1–11.1)

## 2019-08-11 PROCEDURE — 99284 EMERGENCY DEPT VISIT MOD MDM: CPT

## 2019-08-11 PROCEDURE — 93971 EXTREMITY STUDY: CPT

## 2019-08-11 PROCEDURE — 96365 THER/PROPH/DIAG IV INF INIT: CPT

## 2019-08-11 PROCEDURE — 87040 BLOOD CULTURE FOR BACTERIA: CPT

## 2019-08-11 PROCEDURE — 85025 COMPLETE CBC W/AUTO DIFF WBC: CPT

## 2019-08-11 PROCEDURE — 74011250636 HC RX REV CODE- 250/636: Performed by: STUDENT IN AN ORGANIZED HEALTH CARE EDUCATION/TRAINING PROGRAM

## 2019-08-11 PROCEDURE — 36415 COLL VENOUS BLD VENIPUNCTURE: CPT

## 2019-08-11 PROCEDURE — 74011250637 HC RX REV CODE- 250/637: Performed by: STUDENT IN AN ORGANIZED HEALTH CARE EDUCATION/TRAINING PROGRAM

## 2019-08-11 PROCEDURE — 80053 COMPREHEN METABOLIC PANEL: CPT

## 2019-08-11 RX ORDER — FUROSEMIDE 40 MG/1
40 TABLET ORAL DAILY
Refills: 1 | COMMUNITY
Start: 2019-07-23 | End: 2019-12-20 | Stop reason: SDUPTHER

## 2019-08-11 RX ORDER — TRAMADOL HYDROCHLORIDE 50 MG/1
1 TABLET ORAL
Refills: 0 | Status: ON HOLD | COMMUNITY
Start: 2019-08-05 | End: 2019-08-29 | Stop reason: SDUPTHER

## 2019-08-11 RX ORDER — ACETAMINOPHEN 325 MG/1
975 TABLET ORAL ONCE
Status: COMPLETED | OUTPATIENT
Start: 2019-08-11 | End: 2019-08-11

## 2019-08-11 RX ORDER — CLINDAMYCIN PHOSPHATE 600 MG/50ML
600 INJECTION INTRAVENOUS ONCE
Status: COMPLETED | OUTPATIENT
Start: 2019-08-11 | End: 2019-08-11

## 2019-08-11 RX ORDER — SULFAMETHOXAZOLE AND TRIMETHOPRIM 800; 160 MG/1; MG/1
1 TABLET ORAL EVERY 12 HOURS
Refills: 0 | Status: ON HOLD | COMMUNITY
Start: 2019-07-30 | End: 2019-08-27

## 2019-08-11 RX ADMIN — CLINDAMYCIN PHOSPHATE 600 MG: 600 INJECTION, SOLUTION INTRAVENOUS at 23:08

## 2019-08-11 RX ADMIN — ACETAMINOPHEN 975 MG: 325 TABLET ORAL at 23:08

## 2019-08-12 VITALS
HEART RATE: 98 BPM | DIASTOLIC BLOOD PRESSURE: 58 MMHG | OXYGEN SATURATION: 96 % | WEIGHT: 315 LBS | SYSTOLIC BLOOD PRESSURE: 103 MMHG | RESPIRATION RATE: 20 BRPM | HEIGHT: 63 IN | BODY MASS INDEX: 55.81 KG/M2 | TEMPERATURE: 97.9 F

## 2019-08-12 NOTE — ED NOTES
2nd set of cultures completed, patient medicated per orders. Patient tolerated well. No other needs expressed at this time. Call bell in reach.

## 2019-08-12 NOTE — ED PROVIDER NOTES
EMERGENCY DEPARTMENT HISTORY AND PHYSICAL EXAM      Date: 8/11/2019  Patient Name: Odell Portillo    History of Presenting Illness     Chief Complaint   Patient presents with    Leg Pain     onset of sxs x1 month - reports worsening sxs, being treated with PO abx and PO lasix with no relief - pt reports concern for DVT (R) leg - leg with increased pain and swelling - Denies sob / chest pain / fevers / N / V / D       History Provided By: Patient    HPI: Odell Portillo, 38 y.o. male  presents to the ED with cc of right LE pain and swelling. Patient states symptoms began about 1 month ago and have gotten increasingly worse. Patient admits to being seen by his PCP multiple times for this problem and was started on bactrim and increased lasix for treatment. Patient does have a history of lymphedema however states that he has more edema to the LE than normal. He admits to warmth, erythema, and significant sensitivity to touch to the right leg. Patient also states swelling is from his foot to his mid thigh now which is new. Patient denies fever, chest pain, abdominal pain, nausea, vomiting, numbness, tingling, or weakness. Patient denies hx of thromboembolic disorder. There are no other complaints, changes, or physical findings at this time. PCP: Unknown, Provider    No current facility-administered medications on file prior to encounter. Current Outpatient Medications on File Prior to Encounter   Medication Sig Dispense Refill    furosemide (LASIX) 40 mg tablet Take 40 mg by mouth two (2) times a day. 1    trimethoprim-sulfamethoxazole (BACTRIM DS, SEPTRA DS) 160-800 mg per tablet Take 1 Tab by mouth every twelve (12) hours. 0    traMADol (ULTRAM) 50 mg tablet Take 1 Tab by mouth every six (6) hours as needed for Pain.  0       Past History     Past Medical History:  Past Medical History:   Diagnosis Date    Sleep apnea 2018       Past Surgical History:  History reviewed.  No pertinent surgical history. Family History:  History reviewed. No pertinent family history. Social History:  Social History     Tobacco Use    Smoking status: Former Smoker     Packs/day: 0.50     Years: 5.00     Pack years: 2.50     Last attempt to quit: 2018     Years since quittin.7    Smokeless tobacco: Never Used   Substance Use Topics    Alcohol use: No    Drug use: Yes     Frequency: 5.0 times per week     Types: Marijuana       Allergies:  No Known Allergies      Review of Systems   Review of Systems   Constitutional: Positive for chills. Negative for activity change, fatigue and fever. HENT: Negative for sore throat. Eyes: Negative for visual disturbance. Respiratory: Negative for shortness of breath. Cardiovascular: Positive for leg swelling. Negative for chest pain. Gastrointestinal: Negative for abdominal pain, diarrhea, nausea and vomiting. Endocrine: Negative for polyuria. Genitourinary: Negative for dysuria and hematuria. Musculoskeletal:        Right LE pain and erythema   Skin: Negative for rash. Neurological: Negative for weakness and numbness. Physical Exam   Physical Exam   Constitutional: He is oriented to person, place, and time. He appears well-developed and well-nourished. No distress. HENT:   Head: Normocephalic and atraumatic. Eyes: No scleral icterus. Neck: No tracheal deviation present. Cardiovascular: Normal rate and regular rhythm. Exam reveals no gallop and no friction rub. No murmur heard. Pulses:       Dorsalis pedis pulses are 2+ on the right side, and 2+ on the left side. Pulmonary/Chest: Effort normal and breath sounds normal. No respiratory distress. Abdominal: Soft. He exhibits no distension. There is no tenderness. There is no guarding. Musculoskeletal: He exhibits edema (2+ pitting edema to the Right LE. When compared to Left, right left is edematous from mid thigh to foot.  Skin is tense.) and tenderness (to palpation of the right leg). He exhibits no deformity. Neurological: He is alert and oriented to person, place, and time. Skin: Skin is warm and dry. There is erythema (to the right leg, circumfrential ). Psychiatric: He has a normal mood and affect. Diagnostic Study Results     Labs -   No results found for this or any previous visit (from the past 24 hour(s)). Radiologic Studies -   No orders to display     CT Results  (Last 48 hours)    None        CXR Results  (Last 48 hours)    None            Medical Decision Making   I am the first provider for this patient. I reviewed the vital signs, available nursing notes, past medical history, past surgical history, family history and social history. Vital Signs-Reviewed the patient's vital signs. Patient Vitals for the past 24 hrs:   Temp Pulse Resp BP SpO2   08/11/19 2117 97.9 °F (36.6 °C) 98 20 126/82 99 %       Pulse Oximetry Analysis - 99% on RA    Cardiac Monitor:   Rate: 98 bpm  Rhythm: Normal Sinus Rhythm      Records Reviewed: Nursing Notes and Old Medical Records    Provider Notes (Medical Decision Making): This is a 38year old male who comes into the ED due to Right LE pain, erythema, and edema. Suspect patient has cellulitis which has failed outpatient therapy. Cellulitis likely complicated by lymphedema. Due to unilateral nature of the edema and significant tenderness and firmness of the LE, will obtain a duplex US for evaluation of DVT. Will obtain blood cx and lab work for further evaluation of his symptoms. Will begin patient on IV clinadmycin for treatment. Spoke with patient about likely need for admission and he stated that he would like to be discharged home as he has an important meeting to go to tomorrow but would follow up afterwards. He understood risks at this time but will wait for results to review options of admission and discharge. Will continue to monitor and evaluate patient while in the ED.      Patients Duplex US does not show evidence of DVT. Patients WBC WNL and CBC grossly WNL. BMP shows creatinine 1.32 which is around baseline compared to previous and otherwise grossly WNL. Will discharge patient home per his request. Patient understands risks of leaving after discussion that staying would be beneficial. All questions were answered. Will provide patient with return precautions and follow up recommendations. ED Course:   Initial assessment performed. The patients presenting problems have been discussed, and they are in agreement with the care plan formulated and outlined with them. I have encouraged them to ask questions as they arise throughout their visit. Orders Placed This Encounter    CULTURE, BLOOD, PERIPHERAL    CULTURE, BLOOD, PERIPHERAL    CBC WITH AUTOMATED DIFF    COMPREHENSIVE METABOLIC PANEL    furosemide (LASIX) 40 mg tablet    trimethoprim-sulfamethoxazole (BACTRIM DS, SEPTRA DS) 160-800 mg per tablet    traMADol (ULTRAM) 50 mg tablet    acetaminophen (TYLENOL) tablet 975 mg    clindamycin (CLEOCIN) 600mg D5W 50mL IVPB (premix)         Critical Care Time:   0    Disposition:  Discharge home    PLAN:  1. Current Discharge Medication List        2. Follow-up Information    None       Return to ED if worse     Diagnosis     Clinical Impression: No diagnosis found. This note will not be viewable in [address]

## 2019-08-12 NOTE — ED NOTES
Assumed care of patient from triage. Patient c/o RLE pain, swelling, redness and warmth for almost 1 month now. Patient has been treated by his PCP w/ abx, lasix and tramadol for pain w/ no improvement. RLE red, swollen and warm to touch beginning just above the knee. PMS to distal extremity intact. Patient denies SOB, n/v/CP. Patient a/o x 4 in NAD w/ even, unlabored respirations.

## 2019-08-12 NOTE — DISCHARGE INSTRUCTIONS

## 2019-08-17 LAB
BACTERIA SPEC CULT: NORMAL
BACTERIA SPEC CULT: NORMAL
SERVICE CMNT-IMP: NORMAL
SERVICE CMNT-IMP: NORMAL

## 2019-08-26 ENCOUNTER — HOSPITAL ENCOUNTER (INPATIENT)
Age: 44
LOS: 2 days | Discharge: HOME | DRG: 383 | End: 2019-08-29
Attending: EMERGENCY MEDICINE | Admitting: INTERNAL MEDICINE
Payer: MEDICAID

## 2019-08-26 ENCOUNTER — APPOINTMENT (OUTPATIENT)
Dept: GENERAL RADIOLOGY | Age: 44
DRG: 383 | End: 2019-08-26
Attending: EMERGENCY MEDICINE
Payer: MEDICAID

## 2019-08-26 DIAGNOSIS — L03.115 CELLULITIS OF RIGHT LEG: Primary | ICD-10-CM

## 2019-08-26 DIAGNOSIS — M79.89 RIGHT LEG SWELLING: ICD-10-CM

## 2019-08-26 DIAGNOSIS — I89.0 LYMPHEDEMA: ICD-10-CM

## 2019-08-26 DIAGNOSIS — M79.604 RIGHT LEG PAIN: ICD-10-CM

## 2019-08-26 LAB
BASOPHILS # BLD: 0 K/UL (ref 0–0.1)
BASOPHILS NFR BLD: 0 % (ref 0–1)
DIFFERENTIAL METHOD BLD: NORMAL
EOSINOPHIL # BLD: 0.1 K/UL (ref 0–0.4)
EOSINOPHIL NFR BLD: 2 % (ref 0–7)
ERYTHROCYTE [DISTWIDTH] IN BLOOD BY AUTOMATED COUNT: 13.7 % (ref 11.5–14.5)
HCT VFR BLD AUTO: 41.6 % (ref 36.6–50.3)
HGB BLD-MCNC: 13.3 G/DL (ref 12.1–17)
IMM GRANULOCYTES # BLD AUTO: 0 K/UL (ref 0–0.04)
IMM GRANULOCYTES NFR BLD AUTO: 0 % (ref 0–0.5)
LYMPHOCYTES # BLD: 1.2 K/UL (ref 0.8–3.5)
LYMPHOCYTES NFR BLD: 23 % (ref 12–49)
MCH RBC QN AUTO: 29.4 PG (ref 26–34)
MCHC RBC AUTO-ENTMCNC: 32 G/DL (ref 30–36.5)
MCV RBC AUTO: 92 FL (ref 80–99)
MONOCYTES # BLD: 0.7 K/UL (ref 0–1)
MONOCYTES NFR BLD: 13 % (ref 5–13)
NEUTS SEG # BLD: 3.3 K/UL (ref 1.8–8)
NEUTS SEG NFR BLD: 62 % (ref 32–75)
NRBC # BLD: 0 K/UL (ref 0–0.01)
NRBC BLD-RTO: 0 PER 100 WBC
PLATELET # BLD AUTO: 216 K/UL (ref 150–400)
PMV BLD AUTO: 9.9 FL (ref 8.9–12.9)
RBC # BLD AUTO: 4.52 M/UL (ref 4.1–5.7)
WBC # BLD AUTO: 5.4 K/UL (ref 4.1–11.1)

## 2019-08-26 PROCEDURE — 99284 EMERGENCY DEPT VISIT MOD MDM: CPT

## 2019-08-26 PROCEDURE — 83605 ASSAY OF LACTIC ACID: CPT

## 2019-08-26 PROCEDURE — 83880 ASSAY OF NATRIURETIC PEPTIDE: CPT

## 2019-08-26 PROCEDURE — 85025 COMPLETE CBC W/AUTO DIFF WBC: CPT

## 2019-08-26 PROCEDURE — 80053 COMPREHEN METABOLIC PANEL: CPT

## 2019-08-26 PROCEDURE — 71046 X-RAY EXAM CHEST 2 VIEWS: CPT

## 2019-08-26 PROCEDURE — 36415 COLL VENOUS BLD VENIPUNCTURE: CPT

## 2019-08-26 PROCEDURE — 87040 BLOOD CULTURE FOR BACTERIA: CPT

## 2019-08-26 PROCEDURE — 74011250636 HC RX REV CODE- 250/636: Performed by: EMERGENCY MEDICINE

## 2019-08-26 PROCEDURE — 93005 ELECTROCARDIOGRAM TRACING: CPT

## 2019-08-26 RX ORDER — FENTANYL CITRATE 50 UG/ML
50 INJECTION, SOLUTION INTRAMUSCULAR; INTRAVENOUS
Status: COMPLETED | OUTPATIENT
Start: 2019-08-26 | End: 2019-08-27

## 2019-08-26 RX ORDER — VANCOMYCIN 2 GRAM/500 ML IN 0.9 % SODIUM CHLORIDE INTRAVENOUS
2 ONCE
Status: COMPLETED | OUTPATIENT
Start: 2019-08-26 | End: 2019-08-27

## 2019-08-27 ENCOUNTER — APPOINTMENT (OUTPATIENT)
Dept: ULTRASOUND IMAGING | Age: 44
DRG: 383 | End: 2019-08-27
Attending: EMERGENCY MEDICINE
Payer: MEDICAID

## 2019-08-27 PROBLEM — L03.90 CELLULITIS: Status: ACTIVE | Noted: 2019-08-27

## 2019-08-27 LAB
ALBUMIN SERPL-MCNC: 3.5 G/DL (ref 3.5–5)
ALBUMIN/GLOB SERPL: 1 {RATIO} (ref 1.1–2.2)
ALP SERPL-CCNC: 62 U/L (ref 45–117)
ALT SERPL-CCNC: 28 U/L (ref 12–78)
ANION GAP SERPL CALC-SCNC: 5 MMOL/L (ref 5–15)
ANION GAP SERPL CALC-SCNC: 5 MMOL/L (ref 5–15)
AST SERPL-CCNC: 16 U/L (ref 15–37)
ATRIAL RATE: 83 BPM
BASOPHILS # BLD: 0 K/UL (ref 0–0.1)
BASOPHILS NFR BLD: 1 % (ref 0–1)
BILIRUB SERPL-MCNC: 0.2 MG/DL (ref 0.2–1)
BNP SERPL-MCNC: 7 PG/ML
BUN SERPL-MCNC: 11 MG/DL (ref 6–20)
BUN SERPL-MCNC: 13 MG/DL (ref 6–20)
BUN/CREAT SERPL: 10 (ref 12–20)
BUN/CREAT SERPL: 11 (ref 12–20)
CALCIUM SERPL-MCNC: 8.4 MG/DL (ref 8.5–10.1)
CALCIUM SERPL-MCNC: 8.7 MG/DL (ref 8.5–10.1)
CALCULATED P AXIS, ECG09: 40 DEGREES
CALCULATED R AXIS, ECG10: -52 DEGREES
CALCULATED T AXIS, ECG11: 43 DEGREES
CHLORIDE SERPL-SCNC: 106 MMOL/L (ref 97–108)
CHLORIDE SERPL-SCNC: 108 MMOL/L (ref 97–108)
CO2 SERPL-SCNC: 26 MMOL/L (ref 21–32)
CO2 SERPL-SCNC: 29 MMOL/L (ref 21–32)
CREAT SERPL-MCNC: 1.05 MG/DL (ref 0.7–1.3)
CREAT SERPL-MCNC: 1.22 MG/DL (ref 0.7–1.3)
DIAGNOSIS, 93000: NORMAL
DIFFERENTIAL METHOD BLD: ABNORMAL
EOSINOPHIL # BLD: 0.2 K/UL (ref 0–0.4)
EOSINOPHIL NFR BLD: 3 % (ref 0–7)
ERYTHROCYTE [DISTWIDTH] IN BLOOD BY AUTOMATED COUNT: 13.8 % (ref 11.5–14.5)
GLOBULIN SER CALC-MCNC: 3.5 G/DL (ref 2–4)
GLUCOSE SERPL-MCNC: 101 MG/DL (ref 65–100)
GLUCOSE SERPL-MCNC: 95 MG/DL (ref 65–100)
HCT VFR BLD AUTO: 43.8 % (ref 36.6–50.3)
HGB BLD-MCNC: 13.3 G/DL (ref 12.1–17)
IMM GRANULOCYTES # BLD AUTO: 0 K/UL (ref 0–0.04)
IMM GRANULOCYTES NFR BLD AUTO: 1 % (ref 0–0.5)
LACTATE SERPL-SCNC: 0.7 MMOL/L (ref 0.4–2)
LYMPHOCYTES # BLD: 1.1 K/UL (ref 0.8–3.5)
LYMPHOCYTES NFR BLD: 22 % (ref 12–49)
MCH RBC QN AUTO: 28.6 PG (ref 26–34)
MCHC RBC AUTO-ENTMCNC: 30.4 G/DL (ref 30–36.5)
MCV RBC AUTO: 94.2 FL (ref 80–99)
MONOCYTES # BLD: 0.5 K/UL (ref 0–1)
MONOCYTES NFR BLD: 11 % (ref 5–13)
NEUTS SEG # BLD: 3.3 K/UL (ref 1.8–8)
NEUTS SEG NFR BLD: 62 % (ref 32–75)
NRBC # BLD: 0 K/UL (ref 0–0.01)
NRBC BLD-RTO: 0 PER 100 WBC
P-R INTERVAL, ECG05: 158 MS
PLATELET # BLD AUTO: 213 K/UL (ref 150–400)
PMV BLD AUTO: 9.8 FL (ref 8.9–12.9)
POTASSIUM SERPL-SCNC: 4.5 MMOL/L (ref 3.5–5.1)
POTASSIUM SERPL-SCNC: 4.6 MMOL/L (ref 3.5–5.1)
PROT SERPL-MCNC: 7 G/DL (ref 6.4–8.2)
Q-T INTERVAL, ECG07: 382 MS
QRS DURATION, ECG06: 90 MS
QTC CALCULATION (BEZET), ECG08: 448 MS
RBC # BLD AUTO: 4.65 M/UL (ref 4.1–5.7)
SODIUM SERPL-SCNC: 139 MMOL/L (ref 136–145)
SODIUM SERPL-SCNC: 140 MMOL/L (ref 136–145)
VENTRICULAR RATE, ECG03: 83 BPM
WBC # BLD AUTO: 5.1 K/UL (ref 4.1–11.1)

## 2019-08-27 PROCEDURE — 74011000258 HC RX REV CODE- 258: Performed by: EMERGENCY MEDICINE

## 2019-08-27 PROCEDURE — 93971 EXTREMITY STUDY: CPT

## 2019-08-27 PROCEDURE — 36415 COLL VENOUS BLD VENIPUNCTURE: CPT

## 2019-08-27 PROCEDURE — 74011250637 HC RX REV CODE- 250/637: Performed by: INTERNAL MEDICINE

## 2019-08-27 PROCEDURE — 77010033678 HC OXYGEN DAILY

## 2019-08-27 PROCEDURE — 94760 N-INVAS EAR/PLS OXIMETRY 1: CPT

## 2019-08-27 PROCEDURE — 74011000258 HC RX REV CODE- 258: Performed by: INTERNAL MEDICINE

## 2019-08-27 PROCEDURE — 74011250636 HC RX REV CODE- 250/636: Performed by: INTERNAL MEDICINE

## 2019-08-27 PROCEDURE — 74011250636 HC RX REV CODE- 250/636: Performed by: EMERGENCY MEDICINE

## 2019-08-27 PROCEDURE — 96365 THER/PROPH/DIAG IV INF INIT: CPT

## 2019-08-27 PROCEDURE — 85025 COMPLETE CBC W/AUTO DIFF WBC: CPT

## 2019-08-27 PROCEDURE — 96375 TX/PRO/DX INJ NEW DRUG ADDON: CPT

## 2019-08-27 PROCEDURE — 80048 BASIC METABOLIC PNL TOTAL CA: CPT

## 2019-08-27 PROCEDURE — 65270000029 HC RM PRIVATE

## 2019-08-27 RX ORDER — BISACODYL 5 MG
5 TABLET, DELAYED RELEASE (ENTERIC COATED) ORAL DAILY PRN
Status: DISCONTINUED | OUTPATIENT
Start: 2019-08-27 | End: 2019-08-29 | Stop reason: HOSPADM

## 2019-08-27 RX ORDER — ONDANSETRON 2 MG/ML
4 INJECTION INTRAMUSCULAR; INTRAVENOUS
Status: DISCONTINUED | OUTPATIENT
Start: 2019-08-27 | End: 2019-08-29 | Stop reason: HOSPADM

## 2019-08-27 RX ORDER — OXYCODONE AND ACETAMINOPHEN 5; 325 MG/1; MG/1
1 TABLET ORAL
Status: DISCONTINUED | OUTPATIENT
Start: 2019-08-27 | End: 2019-08-29 | Stop reason: HOSPADM

## 2019-08-27 RX ORDER — ACETAMINOPHEN 325 MG/1
650 TABLET ORAL
Status: DISCONTINUED | OUTPATIENT
Start: 2019-08-27 | End: 2019-08-29 | Stop reason: HOSPADM

## 2019-08-27 RX ORDER — SODIUM CHLORIDE 0.9 % (FLUSH) 0.9 %
5-40 SYRINGE (ML) INJECTION EVERY 8 HOURS
Status: DISCONTINUED | OUTPATIENT
Start: 2019-08-27 | End: 2019-08-29 | Stop reason: HOSPADM

## 2019-08-27 RX ORDER — SODIUM CHLORIDE 0.9 % (FLUSH) 0.9 %
5-40 SYRINGE (ML) INJECTION AS NEEDED
Status: DISCONTINUED | OUTPATIENT
Start: 2019-08-27 | End: 2019-08-29 | Stop reason: HOSPADM

## 2019-08-27 RX ORDER — FUROSEMIDE 40 MG/1
40 TABLET ORAL DAILY
Status: DISCONTINUED | OUTPATIENT
Start: 2019-08-27 | End: 2019-08-29 | Stop reason: HOSPADM

## 2019-08-27 RX ORDER — ENOXAPARIN SODIUM 100 MG/ML
40 INJECTION SUBCUTANEOUS EVERY 12 HOURS
Status: DISCONTINUED | OUTPATIENT
Start: 2019-08-27 | End: 2019-08-29 | Stop reason: HOSPADM

## 2019-08-27 RX ADMIN — ENOXAPARIN SODIUM 40 MG: 40 INJECTION SUBCUTANEOUS at 08:42

## 2019-08-27 RX ADMIN — Medication 10 ML: at 14:45

## 2019-08-27 RX ADMIN — CEFTRIAXONE 1 G: 1 INJECTION, POWDER, FOR SOLUTION INTRAMUSCULAR; INTRAVENOUS at 03:46

## 2019-08-27 RX ADMIN — OXYCODONE AND ACETAMINOPHEN 1 TABLET: 5; 325 TABLET ORAL at 14:00

## 2019-08-27 RX ADMIN — Medication 10 ML: at 21:18

## 2019-08-27 RX ADMIN — Medication 10 ML: at 05:07

## 2019-08-27 RX ADMIN — AMPICILLIN SODIUM AND SULBACTAM SODIUM 3 G: 2; 1 INJECTION, POWDER, FOR SOLUTION INTRAMUSCULAR; INTRAVENOUS at 00:06

## 2019-08-27 RX ADMIN — VANCOMYCIN HYDROCHLORIDE 2000 MG: 10 INJECTION, POWDER, LYOPHILIZED, FOR SOLUTION INTRAVENOUS at 00:44

## 2019-08-27 RX ADMIN — SODIUM CHLORIDE 1000 ML: 900 INJECTION, SOLUTION INTRAVENOUS at 00:06

## 2019-08-27 RX ADMIN — OXYCODONE AND ACETAMINOPHEN 1 TABLET: 5; 325 TABLET ORAL at 03:46

## 2019-08-27 RX ADMIN — FENTANYL CITRATE 50 MCG: 50 INJECTION, SOLUTION INTRAMUSCULAR; INTRAVENOUS at 00:06

## 2019-08-27 RX ADMIN — FUROSEMIDE 40 MG: 40 TABLET ORAL at 08:42

## 2019-08-27 RX ADMIN — OXYCODONE AND ACETAMINOPHEN 1 TABLET: 5; 325 TABLET ORAL at 21:17

## 2019-08-27 RX ADMIN — Medication 10 ML: at 03:47

## 2019-08-27 RX ADMIN — ENOXAPARIN SODIUM 40 MG: 40 INJECTION SUBCUTANEOUS at 21:17

## 2019-08-27 RX ADMIN — ENOXAPARIN SODIUM 40 MG: 40 INJECTION SUBCUTANEOUS at 03:47

## 2019-08-27 NOTE — ROUTINE PROCESS
Bedside and Verbal shift change report given to Karyle Piper, RN (oncoming nurse) by Isabell bhatia RN (offgoing nurse). Report included the following information SBAR, Kardex, Intake/Output and MAR.

## 2019-08-27 NOTE — PROGRESS NOTES
0230 Pt received from ED with complaints of BLE pain right worse than Left. Bilateral extremities warm to touch +3/4 pitting edema 10/10 pain. /88 4L02 93%. Skin check completed with RN     9141 Percocet given for pain     0430 Pt appears to have sleep apnea states\" I was told that but I do not use A CPAP or anything at home\"    Moderate relief from pain with percocet. 6/10.

## 2019-08-27 NOTE — PROGRESS NOTES
Pharmacy - Enoxaparin (Lovenox®) Monitoring      Indication: dvt prophylaxis     Current Dose: Enoxaparin 40 mg subcutaneously every 12 hours    Creatinine Clearance (mL/min): > 100 ml/hr    Current Weight: 154.2 kg (BMI 62)    Labs:  Recent Labs     08/26/19  2317   CREA 1.22   HGB 13.3        Wt Readings from Last 1 Encounters:   08/26/19 154.2 kg (339 lb 15.2 oz)     Ht Readings from Last 1 Encounters:   08/26/19 157.5 cm (62\")       Impression/Plan:   · Dose adjusted due to BMI > 40       Thanks,  Deana Cline [facility]

## 2019-08-27 NOTE — H&P
Hospitalist Admission Note    NAME: Carol Morales   :  1975   MRN:  [id number]     Date/Time:  2019 5:47 AM    Patient PCP: Unknown, Provider  ______________________________________________________________________  Given the patient's current clinical presentation, I have a high level of concern for decompensation if discharged from the emergency department. Complex decision making was performed, which includes reviewing the patient's available past medical records, laboratory results, and x-ray films. My assessment of this patient's clinical condition and my plan of care is as follows. Assessment / Plan:    Right lower leg cellulitis  -Admit patient to [facility]  -Start patient on IV antibiotic Rocephin  -Monitoring  -Follow-up venous Doppler lower extremity    Lymphedema  -Continue home medication Lasix    Obesity    Obstructive sleep apnea  -Patient will need sleep study as outpatient      Code Status: Full   Surrogate Decision Maker:Ana Lilia Titus    DVT Prophylaxis: lovenox  GI Prophylaxis: not indicated    Baseline: independent       Subjective:   CHIEF COMPLAINT: Right leg swelling     HISTORY OF PRESENT ILLNESS:     38years old male from home with past medical history significant for obesity, possible ELIF presented to the hospital for evaluation of worsening right lower extremity swelling and pain patient stated his symptoms started about 1 month ago, patient was seen in ED last week and was discharged with oral antibiotic Bactrim without any improvement, patient denies any fever any chills denies any trauma. We were asked to admit for work up and evaluation of the above problems. Past Medical History:   Diagnosis Date    Sleep apnea 2018        History reviewed. No pertinent surgical history.     Social History     Tobacco Use    Smoking status: Former Smoker     Packs/day: 0.50     Years: 5.00     Pack years: 2.50     Last attempt to quit: 2018     Years since quittin.7    Smokeless tobacco: Never Used   Substance Use Topics    Alcohol use: No        family history. obesity   No Known Allergies     Prior to Admission medications    Medication Sig Start Date End Date Taking? Authorizing Provider   furosemide (LASIX) 40 mg tablet Take 40 mg by mouth daily. 19   Graciela Nichols MD   traMADol (ULTRAM) 50 mg tablet Take 1 Tab by mouth every six (6) hours as needed for Pain. 19   Other, MD Graciela       REVIEW OF SYSTEMS:     I am not able to complete the review of systems because:    The patient is intubated and sedated    The patient has altered mental status due to his acute medical problems    The patient has baseline aphasia from prior stroke(s)    The patient has baseline dementia and is not reliable historian    The patient is in acute medical distress and unable to provide information           Total of 12 systems reviewed as follows:       POSITIVE= underlined text  Negative = text not underlined  General:  fever, chills, sweats, generalized weakness, weight loss/gain,      loss of appetite   Eyes:    blurred vision, eye pain, loss of vision, double vision  ENT:    rhinorrhea, pharyngitis   Respiratory:   cough, sputum production, SOB, LINDSAY, wheezing, pleuritic pain   Cardiology:   chest pain, palpitations, orthopnea, PND, edema, syncope   Gastrointestinal:  abdominal pain , N/V, diarrhea, dysphagia, constipation, bleeding   Genitourinary:  frequency, urgency, dysuria, hematuria, incontinence   Muskuloskeletal :  arthralgia, myalgia, back pain  Hematology:  easy bruising, nose or gum bleeding, lymphadenopathy   Dermatological: rash, ulceration, pruritis, color change / jaundice  Endocrine:   hot flashes or polydipsia   Neurological:  headache, dizziness, confusion, focal weakness, paresthesia,     Speech difficulties, memory loss, gait difficulty  Psychological: Feelings of anxiety, depression, agitation    Objective:   VITALS: Visit Vitals  /89 (BP 1 Location: Right arm, BP Patient Position: At rest)   Pulse 88   Temp 98 °F (36.7 °C)   Resp 18   Ht 5' 2\" (1.575 m)   Wt 154.2 kg (339 lb 15.2 oz)   SpO2 99%   BMI 62.18 kg/m²       PHYSICAL EXAM:    General:    Alert, cooperative, no distress, appears stated age. HEENT: Atraumatic, anicteric sclerae, pink conjunctivae     No oral ulcers, mucosa moist, throat clear, dentition fair  Neck:  Supple, symmetrical,  thyroid: non tender  Lungs:   Clear to auscultation bilaterally. No Wheezing or Rhonchi. No rales. Chest wall:  No tenderness  No Accessory muscle use. Heart:   Regular  rhythm,  No  murmur   No edema  Abdomen:   Soft, non-tender. Not distended. Bowel sounds normal  Extremities: No cyanosis. No clubbing,  Right leg swelling     Skin turgor normal, Capillary refill normal, Radial dial pulse 2+  Skin:     Skin redness   Psych:  Good insight. Not depressed. Not anxious or agitated. Neurologic: EOMs intact. No facial asymmetry. No aphasia or slurred speech. Symmetrical strength, Sensation grossly intact. Alert and oriented X 4.     _______________________________________________________________________  Care Plan discussed with:    Comments   Patient y    Family      RN y    Care Manager                    Consultant:      _______________________________________________________________________  Expected  Disposition:   Home with Family y   HH/PT/OT/RN    SNF/LTC    [facility]    ________________________________________________________________________  TOTAL TIME:  61 Minutes    Critical Care Provided     Minutes non procedure based      Comments    y Reviewed previous records   >50% of visit spent in counseling and coordination of care y Discussion with patient and/or family and questions answered       ________________________________________________________________________  Signed:  John Cuevas MD    Procedures: see electronic medical records for all procedures/Xrays and details which were not copied into this note but were reviewed prior to creation of Plan. LAB DATA REVIEWED:    Recent Results (from the past 24 hour(s))   CBC WITH AUTOMATED DIFF    Collection Time: 08/26/19 11:17 PM   Result Value Ref Range    WBC 5.4 4.1 - 11.1 K/uL    RBC 4.52 4.10 - 5.70 M/uL    HGB 13.3 12.1 - 17.0 g/dL    HCT 41.6 36.6 - 50.3 %    MCV 92.0 80.0 - 99.0 FL    MCH 29.4 26.0 - 34.0 PG    MCHC 32.0 30.0 - 36.5 g/dL    RDW 13.7 11.5 - 14.5 %    PLATELET 248 455 - 606 K/uL    MPV 9.9 8.9 - 12.9 FL    NRBC 0.0 0  WBC    ABSOLUTE NRBC 0.00 0.00 - 0.01 K/uL    NEUTROPHILS 62 32 - 75 %    LYMPHOCYTES 23 12 - 49 %    MONOCYTES 13 5 - 13 %    EOSINOPHILS 2 0 - 7 %    BASOPHILS 0 0 - 1 %    IMMATURE GRANULOCYTES 0 0.0 - 0.5 %    ABS. NEUTROPHILS 3.3 1.8 - 8.0 K/UL    ABS. LYMPHOCYTES 1.2 0.8 - 3.5 K/UL    ABS. MONOCYTES 0.7 0.0 - 1.0 K/UL    ABS. EOSINOPHILS 0.1 0.0 - 0.4 K/UL    ABS. BASOPHILS 0.0 0.0 - 0.1 K/UL    ABS. IMM. GRANS. 0.0 0.00 - 0.04 K/UL    DF AUTOMATED     METABOLIC PANEL, COMPREHENSIVE    Collection Time: 08/26/19 11:17 PM   Result Value Ref Range    Sodium 140 136 - 145 mmol/L    Potassium 4.5 3.5 - 5.1 mmol/L    Chloride 106 97 - 108 mmol/L    CO2 29 21 - 32 mmol/L    Anion gap 5 5 - 15 mmol/L    Glucose 101 (H) 65 - 100 mg/dL    BUN 13 6 - 20 MG/DL    Creatinine 1.22 0.70 - 1.30 MG/DL    BUN/Creatinine ratio 11 (L) 12 - 20      GFR est AA >60 >60 ml/min/1.73m2    GFR est non-AA >60 >60 ml/min/1.73m2    Calcium 8.7 8.5 - 10.1 MG/DL    Bilirubin, total 0.2 0.2 - 1.0 MG/DL    ALT (SGPT) 28 12 - 78 U/L    AST (SGOT) 16 15 - 37 U/L    Alk.  phosphatase 62 45 - 117 U/L    Protein, total 7.0 6.4 - 8.2 g/dL    Albumin 3.5 3.5 - 5.0 g/dL    Globulin 3.5 2.0 - 4.0 g/dL    A-G Ratio 1.0 (L) 1.1 - 2.2     NT-PRO BNP    Collection Time: 08/26/19 11:17 PM   Result Value Ref Range    NT pro-BNP 7 <125 PG/ML   LACTIC ACID    Collection Time: 08/26/19 11:33 PM   Result Value Ref Range Lactic acid 0.7 0.4 - 2.0 MMOL/L   EKG, 12 LEAD, INITIAL    Collection Time: 08/26/19 11:41 PM   Result Value Ref Range    Ventricular Rate 83 BPM    Atrial Rate 83 BPM    P-R Interval 158 ms    QRS Duration 90 ms    Q-T Interval 382 ms    QTC Calculation (Bezet) 448 ms    Calculated P Axis 40 degrees    Calculated R Axis -52 degrees    Calculated T Axis 43 degrees    Diagnosis       Normal sinus rhythm  Left axis deviation  When compared with ECG of 06-FEB-2017 11:55,  No significant change was found     METABOLIC PANEL, BASIC    Collection Time: 08/27/19  2:59 AM   Result Value Ref Range    Sodium 139 136 - 145 mmol/L    Potassium 4.6 3.5 - 5.1 mmol/L    Chloride 108 97 - 108 mmol/L    CO2 26 21 - 32 mmol/L    Anion gap 5 5 - 15 mmol/L    Glucose 95 65 - 100 mg/dL    BUN 11 6 - 20 MG/DL    Creatinine 1.05 0.70 - 1.30 MG/DL    BUN/Creatinine ratio 10 (L) 12 - 20      GFR est AA >60 >60 ml/min/1.73m2    GFR est non-AA >60 >60 ml/min/1.73m2    Calcium 8.4 (L) 8.5 - 10.1 MG/DL   CBC WITH AUTOMATED DIFF    Collection Time: 08/27/19  2:59 AM   Result Value Ref Range    WBC 5.1 4.1 - 11.1 K/uL    RBC 4.65 4.10 - 5.70 M/uL    HGB 13.3 12.1 - 17.0 g/dL    HCT 43.8 36.6 - 50.3 %    MCV 94.2 80.0 - 99.0 FL    MCH 28.6 26.0 - 34.0 PG    MCHC 30.4 30.0 - 36.5 g/dL    RDW 13.8 11.5 - 14.5 %    PLATELET 005 645 - 640 K/uL    MPV 9.8 8.9 - 12.9 FL    NRBC 0.0 0  WBC    ABSOLUTE NRBC 0.00 0.00 - 0.01 K/uL    NEUTROPHILS 62 32 - 75 %    LYMPHOCYTES 22 12 - 49 %    MONOCYTES 11 5 - 13 %    EOSINOPHILS 3 0 - 7 %    BASOPHILS 1 0 - 1 %    IMMATURE GRANULOCYTES 1 (H) 0.0 - 0.5 %    ABS. NEUTROPHILS 3.3 1.8 - 8.0 K/UL    ABS. LYMPHOCYTES 1.1 0.8 - 3.5 K/UL    ABS. MONOCYTES 0.5 0.0 - 1.0 K/UL    ABS. EOSINOPHILS 0.2 0.0 - 0.4 K/UL    ABS. BASOPHILS 0.0 0.0 - 0.1 K/UL    ABS. IMM.  GRANS. 0.0 0.00 - 0.04 K/UL    DF AUTOMATED

## 2019-08-27 NOTE — ED TRIAGE NOTES
Patient arrives with c/o BLE, worse on RLE. Patient was recently treated for cellulitis of RLE but states the edema hasn't improved. Denies CHF history, no hx of blood clots. Patient is able to bear weight on extremity but it becomes painful if too long. Patient states he gets dyspneic on exertion. Dr. Jerome Ken at bedside.

## 2019-08-27 NOTE — PROGRESS NOTES
Right lower leg cellulitis  Admit patient to [facility] telemetry  Start patient on IV antibiotic Rocephin  Monitoring  Follow-up venous Doppler lower extremity    Lymphedema  Continue home medication Lasix    Obesity    Obstructive sleep apnea  Patient will need sleep study as outpatient    38years old male from home with past medical history significant for obesity, possible ELIF presented to the hospital for evaluation of worsening right lower extremity swelling and pain patient stated his symptoms started about 1 month ago, patient was seen in ED last week and was discharged with oral antibiotic Bactrim without any improvement, patient denies any fever any chills denies any trauma.

## 2019-08-27 NOTE — ED PROVIDER NOTES
EMERGENCY DEPARTMENT HISTORY AND PHYSICAL EXAM      Date: 8/26/2019  Patient Name: Mark Bolivar  Patient Age and Sex: 38 y.o. male    History of Presenting Illness     Chief Complaint   Patient presents with    Leg Swelling     (R) leg swelling and pain - ongoign pain and swelling for \" a month and a half \" - Denies SOB / CP / Sharan Fletcher / Deisy Sterling;;        History Provided By: Patient    HPI: Mark Bolivar, 38 y.o. male with past medical history as documented below presents to the ED with c/o of 1 months of progressive worsening right lower extremity swelling and pain. Patient was seen here in the emergency room about a week ago for right lower extremity cellulitis. Patient was given a dose of IV Clinda and discharged home on Bactrim. There were discussions about being admitted due to failure of outpatient therapy but patient left AMA. Patient presents today with acute worsening for the past 2 days of worsening right leg pain swelling and redness. He denies any known trauma or injury. Denies any history of fevers. Patient does report a history of lymphedema but does not take any diuretics up until about 2 weeks ago which he was prescribed Lasix. He denies any chest pain, shortness of breath. He denies any history of diabetes or history of MRSA. Pt denies any other alleviating or exacerbating factors. Additionally, pt specifically denies any recent fever, chills, headache, nausea, vomiting, abdominal pain, CP, SOB, lightheadedness, dizziness, numbness, weakness, BLE swelling, heart palpitations, urinary sxs, diarrhea, constipation, melena, hematochezia, cough, or congestion. There are no other complaints, changes or physical findings at this time.      PCP: Unknown, Provider    Past History   Past Medical History:  Past Medical History:   Diagnosis Date    Sleep apnea 2018       Past Surgical History:  Denies    Family History:  Denies    Social History:  Social History     Tobacco Use    Smoking status: Former Smoker     Packs/day: 0.50     Years: 5.00     Pack years: 2.50     Last attempt to quit: 2018     Years since quittin.7    Smokeless tobacco: Never Used   Substance Use Topics    Alcohol use: No    Drug use: Yes     Frequency: 5.0 times per week     Types: Marijuana       Allergies:  No Known Allergies    Current Medications:  No current facility-administered medications on file prior to encounter. Current Outpatient Medications on File Prior to Encounter   Medication Sig Dispense Refill    furosemide (LASIX) 40 mg tablet Take 40 mg by mouth daily. 1    traMADol (ULTRAM) 50 mg tablet Take 1 Tab by mouth every six (6) hours as needed for Pain.  0       Review of Systems   Review of Systems   Constitutional: Negative. Negative for chills and fever. HENT: Negative. Negative for congestion, facial swelling, rhinorrhea, sore throat, trouble swallowing and voice change. Eyes: Negative. Respiratory: Negative. Negative for apnea, cough, chest tightness, shortness of breath and wheezing. Cardiovascular: Negative. Negative for chest pain, palpitations and leg swelling. Gastrointestinal: Negative. Negative for abdominal distention, abdominal pain, blood in stool, constipation, diarrhea, nausea and vomiting. Endocrine: Negative. Negative for cold intolerance, heat intolerance and polyuria. Genitourinary: Negative. Negative for difficulty urinating, dysuria, flank pain, frequency, hematuria and urgency. Musculoskeletal: Positive for arthralgias. Negative for back pain, myalgias, neck pain and neck stiffness. Skin: Positive for wound. Negative for color change and rash. Neurological: Negative. Negative for dizziness, syncope, facial asymmetry, speech difficulty, weakness, light-headedness, numbness and headaches. Hematological: Negative. Does not bruise/bleed easily. Psychiatric/Behavioral: Negative. Negative for confusion and self-injury.  The patient is not nervous/anxious. Physical Exam   Physical Exam   Constitutional: He is oriented to person, place, and time. Vital signs are normal. He appears well-developed and well-nourished. He is cooperative. Non-toxic appearance. HENT:   Head: Normocephalic and atraumatic. Mouth/Throat: Mucous membranes are normal. No posterior oropharyngeal erythema. Eyes: Pupils are equal, round, and reactive to light. Conjunctivae and EOM are normal.   Neck: Normal range of motion. Cardiovascular: Normal rate, regular rhythm, normal heart sounds and intact distal pulses. Exam reveals no gallop and no friction rub. No murmur heard. Pulmonary/Chest: Effort normal and breath sounds normal. No respiratory distress. He has no wheezes. He has no rales. He exhibits no tenderness. Abdominal: Soft. Bowel sounds are normal. He exhibits no distension and no mass. There is no tenderness. There is no rebound and no guarding. Musculoskeletal: Normal range of motion. He exhibits edema and tenderness. He exhibits no deformity. Neurological: He is alert and oriented to person, place, and time. He displays normal reflexes. No cranial nerve deficit. He exhibits normal muscle tone. Coordination normal.   Skin: Skin is warm. No rash noted. There is erythema (Sense of erythema with warmth extending from the proximal thigh down to the distal tibia. Non-circumferential, compartments are soft to palpation. Neurovascular intact distally. ). Psychiatric: He has a normal mood and affect. Nursing note and vitals reviewed.       Diagnostic Study Results     Labs -  Recent Results (from the past 24 hour(s))   CBC WITH AUTOMATED DIFF    Collection Time: 08/26/19 11:17 PM   Result Value Ref Range    WBC 5.4 4.1 - 11.1 K/uL    RBC 4.52 4.10 - 5.70 M/uL    HGB 13.3 12.1 - 17.0 g/dL    HCT 41.6 36.6 - 50.3 %    MCV 92.0 80.0 - 99.0 FL    MCH 29.4 26.0 - 34.0 PG    MCHC 32.0 30.0 - 36.5 g/dL    RDW 13.7 11.5 - 14.5 %    PLATELET 205 685 - 097 K/uL MPV 9.9 8.9 - 12.9 FL    NRBC 0.0 0  WBC    ABSOLUTE NRBC 0.00 0.00 - 0.01 K/uL    NEUTROPHILS 62 32 - 75 %    LYMPHOCYTES 23 12 - 49 %    MONOCYTES 13 5 - 13 %    EOSINOPHILS 2 0 - 7 %    BASOPHILS 0 0 - 1 %    IMMATURE GRANULOCYTES 0 0.0 - 0.5 %    ABS. NEUTROPHILS 3.3 1.8 - 8.0 K/UL    ABS. LYMPHOCYTES 1.2 0.8 - 3.5 K/UL    ABS. MONOCYTES 0.7 0.0 - 1.0 K/UL    ABS. EOSINOPHILS 0.1 0.0 - 0.4 K/UL    ABS. BASOPHILS 0.0 0.0 - 0.1 K/UL    ABS. IMM. GRANS. 0.0 0.00 - 0.04 K/UL    DF AUTOMATED     METABOLIC PANEL, COMPREHENSIVE    Collection Time: 08/26/19 11:17 PM   Result Value Ref Range    Sodium 140 136 - 145 mmol/L    Potassium 4.5 3.5 - 5.1 mmol/L    Chloride 106 97 - 108 mmol/L    CO2 29 21 - 32 mmol/L    Anion gap 5 5 - 15 mmol/L    Glucose 101 (H) 65 - 100 mg/dL    BUN 13 6 - 20 MG/DL    Creatinine 1.22 0.70 - 1.30 MG/DL    BUN/Creatinine ratio 11 (L) 12 - 20      GFR est AA >60 >60 ml/min/1.73m2    GFR est non-AA >60 >60 ml/min/1.73m2    Calcium 8.7 8.5 - 10.1 MG/DL    Bilirubin, total 0.2 0.2 - 1.0 MG/DL    ALT (SGPT) 28 12 - 78 U/L    AST (SGOT) 16 15 - 37 U/L    Alk.  phosphatase 62 45 - 117 U/L    Protein, total 7.0 6.4 - 8.2 g/dL    Albumin 3.5 3.5 - 5.0 g/dL    Globulin 3.5 2.0 - 4.0 g/dL    A-G Ratio 1.0 (L) 1.1 - 2.2     NT-PRO BNP    Collection Time: 08/26/19 11:17 PM   Result Value Ref Range    NT pro-BNP 7 <125 PG/ML   LACTIC ACID    Collection Time: 08/26/19 11:33 PM   Result Value Ref Range    Lactic acid 0.7 0.4 - 2.0 MMOL/L   EKG, 12 LEAD, INITIAL    Collection Time: 08/26/19 11:41 PM   Result Value Ref Range    Ventricular Rate 83 BPM    Atrial Rate 83 BPM    P-R Interval 158 ms    QRS Duration 90 ms    Q-T Interval 382 ms    QTC Calculation (Bezet) 448 ms    Calculated P Axis 40 degrees    Calculated R Axis -52 degrees    Calculated T Axis 43 degrees    Diagnosis       Normal sinus rhythm  Left axis deviation  When compared with ECG of 06-FEB-2017 11:55,  No significant change was found     METABOLIC PANEL, BASIC    Collection Time: 08/27/19  2:59 AM   Result Value Ref Range    Sodium 139 136 - 145 mmol/L    Potassium 4.6 3.5 - 5.1 mmol/L    Chloride 108 97 - 108 mmol/L    CO2 26 21 - 32 mmol/L    Anion gap 5 5 - 15 mmol/L    Glucose 95 65 - 100 mg/dL    BUN 11 6 - 20 MG/DL    Creatinine 1.05 0.70 - 1.30 MG/DL    BUN/Creatinine ratio 10 (L) 12 - 20      GFR est AA >60 >60 ml/min/1.73m2    GFR est non-AA >60 >60 ml/min/1.73m2    Calcium 8.4 (L) 8.5 - 10.1 MG/DL   CBC WITH AUTOMATED DIFF    Collection Time: 08/27/19  2:59 AM   Result Value Ref Range    WBC 5.1 4.1 - 11.1 K/uL    RBC 4.65 4.10 - 5.70 M/uL    HGB 13.3 12.1 - 17.0 g/dL    HCT 43.8 36.6 - 50.3 %    MCV 94.2 80.0 - 99.0 FL    MCH 28.6 26.0 - 34.0 PG    MCHC 30.4 30.0 - 36.5 g/dL    RDW 13.8 11.5 - 14.5 %    PLATELET 457 739 - 386 K/uL    MPV 9.8 8.9 - 12.9 FL    NRBC 0.0 0  WBC    ABSOLUTE NRBC 0.00 0.00 - 0.01 K/uL    NEUTROPHILS 62 32 - 75 %    LYMPHOCYTES 22 12 - 49 %    MONOCYTES 11 5 - 13 %    EOSINOPHILS 3 0 - 7 %    BASOPHILS 1 0 - 1 %    IMMATURE GRANULOCYTES 1 (H) 0.0 - 0.5 %    ABS. NEUTROPHILS 3.3 1.8 - 8.0 K/UL    ABS. LYMPHOCYTES 1.1 0.8 - 3.5 K/UL    ABS. MONOCYTES 0.5 0.0 - 1.0 K/UL    ABS. EOSINOPHILS 0.2 0.0 - 0.4 K/UL    ABS. BASOPHILS 0.0 0.0 - 0.1 K/UL    ABS. IMM. GRANS. 0.0 0.00 - 0.04 K/UL    DF AUTOMATED         Radiologic Studies -   XR CHEST PA LAT   Final Result   IMPRESSION: No acute findings. CT Results  (Last 48 hours)    None        CXR Results  (Last 48 hours)               08/26/19 8859  XR CHEST PA LAT Final result    Impression:  IMPRESSION: No acute findings. Narrative:  EXAM: XR CHEST PA LAT       INDICATION: sob       COMPARISON: Chest x-ray 2/6/2017. FINDINGS: PA and lateral radiographs of the chest demonstrate clear lungs.  The   cardiac and mediastinal contours and pulmonary vascularity are normal. The chest   wall structures and visualized upper abdomen show no acute findings with   incidental note of degenerative spine changes. Medical Decision Making   I am the first provider for this patient. I reviewed the vital signs, available nursing notes, past medical history, past surgical history, family history and social history. Vital Signs-Reviewed the patient's vital signs. Patient Vitals for the past 24 hrs:   Temp Pulse Resp BP SpO2   08/27/19 0430 98 °F (36.7 °C) 88 18 129/89 --   08/27/19 0010 -- -- -- 118/77 --   08/26/19 2244 98.8 °F (37.1 °C) 89 20 135/79 99 %       Pulse Oximetry Analysis - 99% on RA    Cardiac Monitor:   Rate: 89 bpm  Rhythm: Normal Sinus Rhythm      ED EKG interpretation:  Rhythm: normal sinus rhythm; and regular . Rate (approx.): 83; Axis: left; P wave: normal; QRS interval: normal ; ST/T wave: normal; Other findings: normal. This EKG was interpreted by Denise Newton M.D. Records Reviewed: Nursing Notes, Old Medical Records, Previous electrocardiograms, Previous Radiology Studies and Previous Laboratory Studies    Provider Notes (Medical Decision Making):   46-year-old male presenting with persistent right lower extremity swelling and edema along warmth consistent with cellulitis. Patient had a full work-up about a week ago for lymphedema and had a negative duplex. Patient reports significant progression of his RLE cellulitis despite outpatient treatment. Patient will benefit from IV antibiotics and admission. Will check labs however low suspicion for sepsis as patient does not have any SIRS criteria. ED Course:   Initial assessment performed. The patients presenting problems have been discussed, and they are in agreement with the care plan formulated and outlined with them. I have encouraged them to ask questions as they arise throughout their visit. ALCOHOL/SUBSTANCE ABUSE COUNSELING:  Upon evaluation, pt endorsed recent alcohol/illicit drug use.  For approximately 15 minutes, pt has been counseled on the dangers of alcohol and illicit drug use on their health, and they were encouraged to quit as soon as possible in order to decrease further risks to their health. Pt has conveyed their understanding of the risks involved should they continue to use these products. I reviewed our electronic medical record system for any past medical records that were available that may contribute to the patient's current condition, the nursing notes and vital signs from today's visit.   Douglas Ashley MD    Orders Placed During ED Course:  Orders Placed This Encounter    SEVERE SEPSIS AND SEPTIC SHOCK BUNDLE INITIATED    CULTURE, BLOOD, PAIRED    XR CHEST PA LAT    CBC WITH AUTOMATED DIFF    METABOLIC PANEL, COMPREHENSIVE    LACTIC ACID, PLASMA    LACTIC ACID, PLASMA    NT-PRO BNP    METABOLIC PANEL, BASIC    CBC W/ AUTOMATED DIFF    DIET REGULAR    NOTIFY PROVIDER: SPECIFY Notify provider within one hour to start vasopressors if patient is unable to maintain a MAP of greater than or equal to 65 mmHg despite fluid resuscitation CONTINUOUS STAT    VITAL SIGNS    VITAL SIGNS PER UNIT ROUTINE    UP AD PURNIMA    NOTIFY PROVIDER: [address] [place] [address] NOTIFY PROVIDER:  ADMIT STATUS    BLADDER CHECKS    APPLY/MAINTAIN SEQUENTIAL COMPRESSION DEVICE    FULL CODE    OXIMETRY, SPOT CHECK    OXYGEN CANNULA Liters per minute: 3; Indications for O2 therapy: HYPOXIA CONTINUOUS Routine    EKG 12 LEAD INITIAL    INSERT PERIPHERAL IV ONE TIME STAT    vancomycin (VANCOCIN) 2000 mg in  ml infusion    ampicillin-sulbactam (UNASYN) 3 g in 0.9% sodium chloride (MBP/ADV) 100 mL    sodium chloride 0.9 % bolus infusion 1,000 mL    fentaNYL citrate (PF) injection 50 mcg    sodium chloride (NS) flush 5-40 mL    sodium chloride (NS) flush 5-40 mL    acetaminophen (TYLENOL) tablet 650 mg    ondansetron (ZOFRAN) injection 4 mg    bisacodyl (DULCOLAX) tablet 5 mg    enoxaparin (LOVENOX) injection 40 mg    cefTRIAXone (ROCEPHIN) 1 g in 0.9% sodium chloride (MBP/ADV) 50 mL    furosemide (LASIX) tablet 40 mg    oxyCODONE-acetaminophen (PERCOCET) 5-325 mg per tablet 1 Tab    IP CONSULT TO HOSPITALIST    INITIAL PHYSICIAN ORDER: INPATIENT Surgical; 3. Patient receiving treatment that can only be provided in an inpatient setting (further clarification in H&P documentation)     Medications Administered:  Medications   sodium chloride (NS) flush 5-40 mL (10 mL IntraVENous Given 8/27/19 0507)   sodium chloride (NS) flush 5-40 mL (has no administration in time range)   acetaminophen (TYLENOL) tablet 650 mg (has no administration in time range)   ondansetron (ZOFRAN) injection 4 mg (has no administration in time range)   bisacodyl (DULCOLAX) tablet 5 mg (has no administration in time range)   enoxaparin (LOVENOX) injection 40 mg (40 mg SubCUTAneous Given 8/27/19 0347)   cefTRIAXone (ROCEPHIN) 1 g in 0.9% sodium chloride (MBP/ADV) 50 mL (1 g IntraVENous New Bag 8/27/19 0346)   furosemide (LASIX) tablet 40 mg (has no administration in time range)   oxyCODONE-acetaminophen (PERCOCET) 5-325 mg per tablet 1 Tab (1 Tab Oral Given 8/27/19 0346)   vancomycin (VANCOCIN) 2000 mg in  ml infusion (2,000 mg IntraVENous New Bag 8/27/19 0044)   ampicillin-sulbactam (UNASYN) 3 g in 0.9% sodium chloride (MBP/ADV) 100 mL (0 g IntraVENous IV Completed 8/27/19 0043)   sodium chloride 0.9 % bolus infusion 1,000 mL (1,000 mL IntraVENous Continued On Admission 8/27/19 0100)   fentaNYL citrate (PF) injection 50 mcg (50 mcg IntraVENous Given 8/27/19 0006)     Progress Note  I have just re-evaluated the patient. Patient reports improvement of pain   I have reviewed His vital signs and determined there is currently no worsening in their condition or physical exam. Results have been reviewed with them and their questions have been answered. We will continue to review further results as they come available.      Consult Note:  Dylan Avalos MD spoke with Dr. Fernandez Charles, Specialty: Hospitalist  Discussed pt's hx, disposition, and available diagnostic and imaging results. Reviewed care plans. Agree with management and plan thus far. Consultant will evaluate pt for admission. Disposition: ADMIT  Patient is being admitted to the hospital.  The results of their tests and reasons for their admission have been discussed with them and/or available family. They convey agreement and understanding for the need to be admitted and for their admission diagnosis. Consultation has been made with the inpatient physician specialist for hospitalization. Diagnosis     Clinical Impression:   1. Cellulitis of right leg    2. Right leg pain    3. Right leg swelling    4. Lymphedema        Attestation:  I personally performed the services described in this documentation on this date 8/26/2019 for patient, Gianna Salazar. Lisa Acuña MD    Please note that this dictation was completed with "[employer] the computer voice recognition software. Quite often unanticipated grammatical, syntax, homophones, and other interpretive errors are inadvertently transcribed by the computer software. Please disregard these errors. Please excuse any errors that have escaped final proofreading. This note will not be viewable in [address]

## 2019-08-27 NOTE — ED NOTES
Patient apneic while sleeping, 2L applied for desaturations. Patient reports no pain after fentanyl administration.

## 2019-08-28 LAB
ANION GAP SERPL CALC-SCNC: 5 MMOL/L (ref 5–15)
BASOPHILS # BLD: 0 K/UL (ref 0–0.1)
BASOPHILS NFR BLD: 0 % (ref 0–1)
BUN SERPL-MCNC: 13 MG/DL (ref 6–20)
BUN/CREAT SERPL: 10 (ref 12–20)
CALCIUM SERPL-MCNC: 8.6 MG/DL (ref 8.5–10.1)
CHLORIDE SERPL-SCNC: 104 MMOL/L (ref 97–108)
CO2 SERPL-SCNC: 29 MMOL/L (ref 21–32)
CREAT SERPL-MCNC: 1.34 MG/DL (ref 0.7–1.3)
DIFFERENTIAL METHOD BLD: ABNORMAL
EOSINOPHIL # BLD: 0.2 K/UL (ref 0–0.4)
EOSINOPHIL NFR BLD: 4 % (ref 0–7)
ERYTHROCYTE [DISTWIDTH] IN BLOOD BY AUTOMATED COUNT: 13.9 % (ref 11.5–14.5)
GLUCOSE SERPL-MCNC: 108 MG/DL (ref 65–100)
HCT VFR BLD AUTO: 44.5 % (ref 36.6–50.3)
HGB BLD-MCNC: 13.7 G/DL (ref 12.1–17)
IMM GRANULOCYTES # BLD AUTO: 0 K/UL (ref 0–0.04)
IMM GRANULOCYTES NFR BLD AUTO: 1 % (ref 0–0.5)
LYMPHOCYTES # BLD: 1.4 K/UL (ref 0.8–3.5)
LYMPHOCYTES NFR BLD: 24 % (ref 12–49)
MCH RBC QN AUTO: 29.2 PG (ref 26–34)
MCHC RBC AUTO-ENTMCNC: 30.8 G/DL (ref 30–36.5)
MCV RBC AUTO: 94.9 FL (ref 80–99)
MONOCYTES # BLD: 0.5 K/UL (ref 0–1)
MONOCYTES NFR BLD: 9 % (ref 5–13)
NEUTS SEG # BLD: 3.6 K/UL (ref 1.8–8)
NEUTS SEG NFR BLD: 62 % (ref 32–75)
NRBC # BLD: 0 K/UL (ref 0–0.01)
NRBC BLD-RTO: 0 PER 100 WBC
PLATELET # BLD AUTO: 223 K/UL (ref 150–400)
PMV BLD AUTO: 9.9 FL (ref 8.9–12.9)
POTASSIUM SERPL-SCNC: 4.6 MMOL/L (ref 3.5–5.1)
RBC # BLD AUTO: 4.69 M/UL (ref 4.1–5.7)
SODIUM SERPL-SCNC: 138 MMOL/L (ref 136–145)
WBC # BLD AUTO: 5.7 K/UL (ref 4.1–11.1)

## 2019-08-28 PROCEDURE — 85025 COMPLETE CBC W/AUTO DIFF WBC: CPT

## 2019-08-28 PROCEDURE — 94760 N-INVAS EAR/PLS OXIMETRY 1: CPT

## 2019-08-28 PROCEDURE — 77010033678 HC OXYGEN DAILY

## 2019-08-28 PROCEDURE — 65270000029 HC RM PRIVATE

## 2019-08-28 PROCEDURE — 74011250637 HC RX REV CODE- 250/637: Performed by: INTERNAL MEDICINE

## 2019-08-28 PROCEDURE — 74011000258 HC RX REV CODE- 258: Performed by: INTERNAL MEDICINE

## 2019-08-28 PROCEDURE — 80048 BASIC METABOLIC PNL TOTAL CA: CPT

## 2019-08-28 PROCEDURE — 74011250636 HC RX REV CODE- 250/636: Performed by: HOSPITALIST

## 2019-08-28 PROCEDURE — 36415 COLL VENOUS BLD VENIPUNCTURE: CPT

## 2019-08-28 PROCEDURE — 74011250636 HC RX REV CODE- 250/636: Performed by: INTERNAL MEDICINE

## 2019-08-28 RX ORDER — CEFAZOLIN SODIUM/WATER 2 G/20 ML
2 SYRINGE (ML) INTRAVENOUS EVERY 8 HOURS
Status: DISCONTINUED | OUTPATIENT
Start: 2019-08-28 | End: 2019-08-29 | Stop reason: HOSPADM

## 2019-08-28 RX ADMIN — FUROSEMIDE 40 MG: 40 TABLET ORAL at 09:07

## 2019-08-28 RX ADMIN — ONDANSETRON 4 MG: 2 INJECTION INTRAMUSCULAR; INTRAVENOUS at 16:27

## 2019-08-28 RX ADMIN — OXYCODONE AND ACETAMINOPHEN 1 TABLET: 5; 325 TABLET ORAL at 23:53

## 2019-08-28 RX ADMIN — ACETAMINOPHEN 650 MG: 325 TABLET ORAL at 04:14

## 2019-08-28 RX ADMIN — Medication 10 ML: at 13:08

## 2019-08-28 RX ADMIN — Medication 2 G: at 23:45

## 2019-08-28 RX ADMIN — CEFTRIAXONE 1 G: 1 INJECTION, POWDER, FOR SOLUTION INTRAMUSCULAR; INTRAVENOUS at 02:28

## 2019-08-28 RX ADMIN — OXYCODONE AND ACETAMINOPHEN 1 TABLET: 5; 325 TABLET ORAL at 17:45

## 2019-08-28 RX ADMIN — Medication 10 ML: at 05:27

## 2019-08-28 RX ADMIN — OXYCODONE AND ACETAMINOPHEN 1 TABLET: 5; 325 TABLET ORAL at 07:37

## 2019-08-28 RX ADMIN — ENOXAPARIN SODIUM 40 MG: 40 INJECTION SUBCUTANEOUS at 21:19

## 2019-08-28 RX ADMIN — ENOXAPARIN SODIUM 40 MG: 40 INJECTION SUBCUTANEOUS at 09:07

## 2019-08-28 RX ADMIN — Medication 10 ML: at 21:19

## 2019-08-28 RX ADMIN — Medication 2 G: at 16:20

## 2019-08-28 NOTE — PROGRESS NOTES
General Surgery End of Shift Nursing Note    Bedside shift change report given to Ventura Joe (oncoming nurse) by Walker Swanson (offgoing nurse). Report included the following information SBAR, Kardex, Intake/Output, MAR and Recent Results. Shift worked:   7 am to 7 pm   Summary of shift:  Pain and nausea meds given as requested. Pt tolerating diet. Voiding appropriately. Antibiotics changed today as well. Issues for physician to address:   none     Number times ambulated in hallway past shift: 0    Number of times OOB to chair past shift: 2    Pain Management:  Current medication: percocet  Patient states pain is manageable on current pain medication: YES    GI:    Current diet:  DIET REGULAR    Tolerating current diet: YES  Passing flatus: YES  Last Bowel Movement: today       Respiratory:    Incentive Spirometer at bedside: NO  Patient instructed on use: NO    Patient Safety:    Falls Score: 1  Bed Alarm On? No  Sitter?  No    Asuncion Din

## 2019-08-28 NOTE — PROGRESS NOTES
Hospitalist Progress Note    NAME: Landry Early   :  1975   MRN:  [id number]       Assessment / Plan:    Right lower leg cellulitis POA not improving  -will change ceftriaxone to cefazolin  -venous Doppler lower extremity neg for dvt    Lymphedema  -Continue home medication Lasix    Obstructive sleep apnea  -Patient will need sleep study as outpatient        Morbid obesity  Body mass index is 62.18 kg/m². Code Status: Full   Surrogate Decision Maker:Ana Lilia Titus     DVT Prophylaxis: lovenox  GI Prophylaxis: not indicated     Baseline: independent          Subjective:     Chief Complaint / Reason for Physician Visit  \"I am not ok, my leg still hurts\". Discussed with RN events overnight. Review of Systems:  Symptom Y/N Comments  Symptom Y/N Comments   Fever/Chills n   Chest Pain     Poor Appetite    Edema y    Cough n   Abdominal Pain n    Sputum    Joint Pain     SOB/LINDSAY n   Pruritis/Rash n    Nausea/vomit    Tolerating PT/OT y    Diarrhea    Tolerating Diet y    Constipation    Other       Could NOT obtain due to:      Objective:     VITALS:   Last 24hrs VS reviewed since prior progress note. Most recent are:  Patient Vitals for the past 24 hrs:   Temp Pulse Resp BP SpO2   19 1240 98.3 °F (36.8 °C) (!) 101 20 114/63 96 %   19 0749 98.4 °F (36.9 °C) 95 17 123/64 92 %   19 0255 98 °F (36.7 °C) 85 20 114/63 97 %   19 2322 98.6 °F (37 °C) 88 20 (!) 149/100 98 %   19 1942 98 °F (36.7 °C) 95 18 130/67 100 %   19 1520 97.5 °F (36.4 °C) 90 18 149/87 95 %       Intake/Output Summary (Last 24 hours) at 2019 1309  Last data filed at 2019 1114  Gross per 24 hour   Intake 1280 ml   Output 0 ml   Net 1280 ml        PHYSICAL EXAM:  General: Alert, cooperative, no acute distress    EENT:  Anicteric sclerae. MMM  Resp:  CTA bilaterally.   No accessory muscle use  CV:  Regular  rhythm,  No edema  GI:  Soft, Non distended, Non tender.  +Bowel sounds  Neurologic:  Alert and oriented X 3, normal speech  Psych:   Good insight. Not anxious nor agitated  Skin:  No rashes. No jaundice    Reviewed most current lab test results and cultures  YES  Reviewed most current radiology test results   YES  Review and summation of old records today    NO  Reviewed patient's current orders and MAR    YES  PMH/SH reviewed - no change compared to H&P  ________________________________________________________________________  Care Plan discussed with:    Comments   Patient x    Family      RN x    Care Manager     Consultant                        Multidiciplinary team rounds were held today with , nursing, pharmacist and clinical coordinator. Patient's plan of care was discussed; medications were reviewed and discharge planning was addressed. ________________________________________________________________________  Total NON critical care TIME:    Minutes    Total CRITICAL CARE TIME Spent:   Minutes non procedure based      Comments   >50% of visit spent in counseling and coordination of care     ________________________________________________________________________  Amisha Gleason MD     Procedures: see electronic medical records for all procedures/Xrays and details which were not copied into this note but were reviewed prior to creation of Plan. LABS:  I reviewed today's most current labs and imaging studies.   Pertinent labs include:  Recent Labs     08/28/19 0231 08/27/19 0259 08/26/19 2317   WBC 5.7 5.1 5.4   HGB 13.7 13.3 13.3   HCT 44.5 43.8 41.6    213 216     Recent Labs     08/28/19  0231 08/27/19  0259 08/26/19  2317    139 140   K 4.6 4.6 4.5    108 106   CO2 29 26 29   * 95 101*   BUN 13 11 13   CREA 1.34* 1.05 1.22   CA 8.6 8.4* 8.7   ALB  --   --  3.5   TBILI  --   --  0.2   SGOT  --   --  16   ALT  --   --  28       Signed: Amisha Gleason MD

## 2019-08-28 NOTE — WOUND CARE
Wound care nurse consulted by staff nurse for \"lymphadema\". Patient is amorbidly obese 39 y/o AAM admitted for cellulitis. Past Medical History:   Diagnosis Date    Sleep apnea 2018     History reviewed. No pertinent surgical history. He has no open wounds and WC here does not do lymphedema wraps (they are individualized wraps that you have to be trained to do). The patients wife has the lymphedema wraps and knows how to wrap him. It is up to the hospitalist wether she can wrap his leg daily to keep fluid out of leg (highly suggest). The wraps are placed daily before patient gets OOB. This information Tiger texted to Dr Cristi Geller, attending.     Zain Fried RN, [employer]

## 2019-08-29 VITALS
RESPIRATION RATE: 18 BRPM | WEIGHT: 315 LBS | HEART RATE: 104 BPM | BODY MASS INDEX: 57.97 KG/M2 | TEMPERATURE: 98.3 F | OXYGEN SATURATION: 91 % | SYSTOLIC BLOOD PRESSURE: 131 MMHG | DIASTOLIC BLOOD PRESSURE: 90 MMHG | HEIGHT: 62 IN

## 2019-08-29 LAB
ANION GAP SERPL CALC-SCNC: 4 MMOL/L (ref 5–15)
BASOPHILS # BLD: 0 K/UL (ref 0–0.1)
BASOPHILS NFR BLD: 0 % (ref 0–1)
BUN SERPL-MCNC: 13 MG/DL (ref 6–20)
BUN/CREAT SERPL: 9 (ref 12–20)
CALCIUM SERPL-MCNC: 8.8 MG/DL (ref 8.5–10.1)
CHLORIDE SERPL-SCNC: 101 MMOL/L (ref 97–108)
CO2 SERPL-SCNC: 32 MMOL/L (ref 21–32)
CREAT SERPL-MCNC: 1.49 MG/DL (ref 0.7–1.3)
DIFFERENTIAL METHOD BLD: ABNORMAL
EOSINOPHIL # BLD: 0.2 K/UL (ref 0–0.4)
EOSINOPHIL NFR BLD: 4 % (ref 0–7)
ERYTHROCYTE [DISTWIDTH] IN BLOOD BY AUTOMATED COUNT: 14 % (ref 11.5–14.5)
GLUCOSE SERPL-MCNC: 106 MG/DL (ref 65–100)
HCT VFR BLD AUTO: 47.5 % (ref 36.6–50.3)
HGB BLD-MCNC: 14.3 G/DL (ref 12.1–17)
IMM GRANULOCYTES # BLD AUTO: 0 K/UL (ref 0–0.04)
IMM GRANULOCYTES NFR BLD AUTO: 1 % (ref 0–0.5)
LYMPHOCYTES # BLD: 1.5 K/UL (ref 0.8–3.5)
LYMPHOCYTES NFR BLD: 26 % (ref 12–49)
MCH RBC QN AUTO: 28.7 PG (ref 26–34)
MCHC RBC AUTO-ENTMCNC: 30.1 G/DL (ref 30–36.5)
MCV RBC AUTO: 95.2 FL (ref 80–99)
MONOCYTES # BLD: 0.7 K/UL (ref 0–1)
MONOCYTES NFR BLD: 12 % (ref 5–13)
NEUTS SEG # BLD: 3.4 K/UL (ref 1.8–8)
NEUTS SEG NFR BLD: 57 % (ref 32–75)
NRBC # BLD: 0 K/UL (ref 0–0.01)
NRBC BLD-RTO: 0 PER 100 WBC
PLATELET # BLD AUTO: 214 K/UL (ref 150–400)
PMV BLD AUTO: 9.8 FL (ref 8.9–12.9)
POTASSIUM SERPL-SCNC: 4.7 MMOL/L (ref 3.5–5.1)
RBC # BLD AUTO: 4.99 M/UL (ref 4.1–5.7)
SODIUM SERPL-SCNC: 137 MMOL/L (ref 136–145)
WBC # BLD AUTO: 5.9 K/UL (ref 4.1–11.1)

## 2019-08-29 PROCEDURE — 77010033678 HC OXYGEN DAILY

## 2019-08-29 PROCEDURE — 74011250636 HC RX REV CODE- 250/636: Performed by: HOSPITALIST

## 2019-08-29 PROCEDURE — 85025 COMPLETE CBC W/AUTO DIFF WBC: CPT

## 2019-08-29 PROCEDURE — 36415 COLL VENOUS BLD VENIPUNCTURE: CPT

## 2019-08-29 PROCEDURE — 80048 BASIC METABOLIC PNL TOTAL CA: CPT

## 2019-08-29 PROCEDURE — 94760 N-INVAS EAR/PLS OXIMETRY 1: CPT

## 2019-08-29 PROCEDURE — 74011250637 HC RX REV CODE- 250/637: Performed by: INTERNAL MEDICINE

## 2019-08-29 RX ORDER — TRAMADOL HYDROCHLORIDE 50 MG/1
50 TABLET ORAL
Qty: 9 TAB | Refills: 0 | Status: SHIPPED | OUTPATIENT
Start: 2019-08-29 | End: 2019-09-01

## 2019-08-29 RX ORDER — CEPHALEXIN 500 MG/1
500 CAPSULE ORAL 3 TIMES DAILY
Qty: 15 CAP | Refills: 0 | Status: SHIPPED | OUTPATIENT
Start: 2019-08-29 | End: 2019-09-03

## 2019-08-29 RX ORDER — DOXYCYCLINE 100 MG/1
100 TABLET ORAL 2 TIMES DAILY
Qty: 10 TAB | Refills: 0 | Status: SHIPPED | OUTPATIENT
Start: 2019-08-29 | End: 2019-09-03

## 2019-08-29 RX ADMIN — Medication 10 ML: at 05:19

## 2019-08-29 RX ADMIN — OXYCODONE AND ACETAMINOPHEN 1 TABLET: 5; 325 TABLET ORAL at 12:17

## 2019-08-29 RX ADMIN — OXYCODONE AND ACETAMINOPHEN 1 TABLET: 5; 325 TABLET ORAL at 06:17

## 2019-08-29 RX ADMIN — Medication 2 G: at 05:19

## 2019-08-29 RX ADMIN — Medication 10 ML: at 08:33

## 2019-08-29 RX ADMIN — Medication 2 G: at 13:07

## 2019-08-29 NOTE — PROGRESS NOTES
General Surgery End of Shift Nursing Note    Bedside shift change report given to Porfirio Blackman (oncoming nurse) by Elgin Pena (offgoing nurse). Shift worked:   [id number]   Summary of shift:    Pt did complain of pain during shift, pain medication administered as ordered. Pt states pain is manageable on current pain med. Pt did sit up in the chair during shift, ambulated to rest room,  Pt tolerating diet.      Issues for physician to address:              Federico Carroll

## 2019-08-29 NOTE — ROUTINE PROCESS
9559- Noted that pt's Cr level is 1.49 (it was 1.34 yesterday). Dr. Cristi Geller notified to quested whether to give pt's scheduled dose of Lasix. 8601- Order received to hold Lasix dose for today. 5387- Dr. Cristi Geller requests to administer 1400 dose of Ancef at 1300; as pt wants to leave around 1400 today.

## 2019-08-29 NOTE — DISCHARGE INSTRUCTIONS
Patient Education        Preventing Lymphedema After Treatment for Breast Cancer: Care Instructions  Your Care Instructions    Lymphedema is a buildup of fluid in the soft tissues of the body. It can happen in the arm after breast cancer surgery to remove lymph nodes. If there are few or no lymph nodes, fluid can build up in the arm. It can also happen if the lymph system in an arm has been damaged. Infection, tumors, and scar tissue from radiation therapy to the armpit area also can cause fluid to build up. You may be able to avoid lymphedema or keep it under control by following the tips below. Make sure that you take good care of the skin on your arm and hand. Your skin acts as a barrier to keep out bacteria and prevent infection. It is also important not to overuse the muscles in the arm. And don't expose your arm to very hot or cold temperatures. Lymphedema can happen soon after breast cancer treatment. Or it may happen many years later. It may affect only part of your arm or hand. In some cases, it affects all of the arm. Make sure to follow these precautions even after you finish treatment. Do not ignore tightness or swelling in or around your arm or hand. You are less likely to have long-term problems if you get these symptoms treated right away. Follow-up care is a key part of your treatment and safety. Be sure to make and go to all appointments, and call your doctor if you are having problems. It's also a good idea to know your test results and keep a list of the medicines you take. How can you care for yourself at home?  [facility] care    · Keep your arm, hand, and armpit clean. Use a mild soap that does not dry out your skin.     · Moisturize your skin often.     · Take good care of the skin around your fingernails.  Do not bite or cut your cuticles.     · Ask your doctor how to handle any cuts, scratches, insect bites, or other injuries you may get.     · Use sunscreen and insect repellent outdoors to protect your skin from sunburn and insect bites.     · Use an electric razor to shave your armpit. It's less likely to cut or irritate your skin. Activity    · Don't wear clothing or jewelry that is tight on your arm or hand. Your doctor may advise you not to wear a watch or rings on the affected hand.     · Wear gloves when you do activities that could hurt the skin on your fingers or hand. Wear them when you garden, do yard work, wash dishes, and clean with chemicals. Use oven mitts when you handle hot food.     · Do not have blood drawn from the arm on the side of the lymph node surgery. Do not get injections (shots) or have an IV put in the affected arm.     · Do not allow a blood pressure cuff to be placed on that arm. If you are in the hospital, make sure you tell your nurse and other hospital staff about your condition.     · Do not expose your arm to very hot or very cold temperatures. For example, do not use hot tubs, saunas, or steam rooms. Do not use a heating pad or cold pack on that arm or shoulder.     · Rest your arm often when you do repeated movements, such as vacuum, scrub, or mop.     · Try to use your other arm to carry heavy things, such as grocery bags. Avoid shoulder straps when you carry a briefcase or purse. Carry your purse on your good arm.     · Ask your doctor about wearing a compression sleeve and glove (gauntlet). Your doctor may want you to wear these when you exercise or when you fly in an airplane. They can help keep fluid from pooling in your arm and hand. Exercise    · Ask your doctor about exercises for your arm and hand. Your doctor may recommend that you see a physical therapist. This person can teach you how to do self-massage to move fluid out of your arm.     · Check with your doctor before you start exercises that use the arm. This includes tennis, rowing, or weight lifting. Your doctor can help you find an activity level that is right for you.    When should you call for help? Call your doctor now or seek immediate medical care if:    · You have signs of infection, such as:  ? Increased pain, swelling, warmth, or redness. ? Red streaks leading from the area. ? Pus draining from the area. ? A fever.    Watch closely for changes in your health, and be sure to contact your doctor if:    · You have new or worse symptoms from lymphedema.     · You do not get better as expected. Where can you learn more? Go to http://darrian-kate.info/. Enter G546 in the search box to learn more about \"Preventing Lymphedema After Treatment for Breast Cancer: Care Instructions. \"  Current as of: December 19, 2018  Content Version: 12.1  © 3930-4159 [employer]. Care instructions adapted under license by [employer] (which disclaims liability or warranty for this information). If you have questions about a medical condition or this instruction, always ask your healthcare professional. [address] any warranty or liability for your use of this information.

## 2019-08-29 NOTE — PROGRESS NOTES
Plan:  -Home with family   -Wife to transport       Reason for Admission:   Cellulitis                    RRAT Score:          4           Plan for utilizing home health:      None                     Current Advanced Directive/Advance Care Plan: Not on file/Full code                          Transition of Care Plan:       Home with family                  12:17PM  CM met with pt to complete assessment and discuss d/c planning. Pt is a 39 yo male admitted for cellulitis. Has completed course of IV abx. Switching to PO abx for d/c. Pt has had a change in address ([place] [address]). CM updated in CC. Pt reports that he is independent at baseline, including driving. No PCP listed in chart. Pt stated that he sees Dr. Agustin Olson for PCP care. Updated provider in chart. Hospital follow-up appointment scheduled and on AVS. Pt up ad rose marie in room. Pt's wife Yamileth Vernon to transport home after his last afternoon IV abx dose. CM available for any additional needs. Care Management Interventions  Mode of Transport at Discharge:  Other (see comment)(Pt's wife )  Transition of Care Consult (CM Consult): Discharge Planning  Discharge Durable Medical Equipment: No  Physical Therapy Consult: No  Occupational Therapy Consult: No  Speech Therapy Consult: No  Current Support Network: Lives with Spouse  Confirm Follow Up Transport: Family  Plan discussed with Pt/Family/Caregiver: Yes  Discharge Location  Discharge Placement: Home with family assistance        MATHEUS Gomez  Care Manager

## 2019-08-29 NOTE — DISCHARGE SUMMARY
Hospitalist Discharge Summary     Patient ID:  Oseas Velázquez  [id number]  78 y.o.  1975 8/26/2019    PCP on record: Unknown, Provider    Admit date: 8/26/2019  Discharge date and time: 8/29/2019    DISCHARGE DIAGNOSIS:    Right lower leg cellulitis   Lymphedema  Obstructive sleep apnea  Morbid obesity      CONSULTATIONS:  IP CONSULT TO HOSPITALIST    Excerpted HPI from H&P of Bety Mckeon MD:  38years old male from home with past medical history significant for obesity, possible ELIF presented to the hospital for evaluation of worsening right lower extremity swelling and pain patient stated his symptoms started about 1 month ago, patient was seen in ED last week and was discharged with oral antibiotic Bactrim without any improvement, patient denies any fever any chills denies any trauma. We were asked to admit for work up and evaluation of the above problems. ______________________________________________________________________  DISCHARGE SUMMARY/HOSPITAL COURSE:  for full details see H&P, daily progress notes, labs, consult notes. Pt admitted for Right lower leg cellulitis in setting of chronic lymphedema and morbid obesity. Pt was afebrile and had normal wbc. Initially he is treated with ceftriaxone but later switched to to cefazolin. Cellulitis continue to improve, pt is ambulatory and requesting to go home because he has personal issues to take of care of later today. Considering pt is hemodynamically stable and improving will discharge on po antibiotics to complete 7 day course. Needs outpt f/u with pulmonology for sleep studies. Check BMP in one week to follow on renal function. _______________________________________________________________________  Patient seen and examined by me on discharge day. Pertinent Findings:  Gen:    Not in distress  Chest: Clear lungs  CVS:   Regular rhythm.   No edema  Abd:  Soft, not distended, not tender  Neuro:  Alert, cn 2-12 grossly intact  _______________________________________________________________________  DISCHARGE MEDICATIONS:   Current Discharge Medication List      START taking these medications    Details   cephALEXin (KEFLEX) 500 mg capsule Take 1 Cap by mouth three (3) times daily for 5 days. Qty: 15 Cap, Refills: 0      doxycycline (ADOXA) 100 mg tablet Take 1 Tab by mouth two (2) times a day for 5 days. Qty: 10 Tab, Refills: 0         CONTINUE these medications which have CHANGED    Details   traMADol (ULTRAM) 50 mg tablet Take 1 Tab by mouth every six (6) hours as needed for Pain for up to 3 days. Max Daily Amount: 200 mg. Qty: 9 Tab, Refills: 0    Associated Diagnoses: Cellulitis of right leg         CONTINUE these medications which have NOT CHANGED    Details   furosemide (LASIX) 40 mg tablet Take 40 mg by mouth daily. Refills: 1               Patient Follow Up Instructions:    Activity: Activity as tolerated  Diet: Regular Diet  Wound Care: None needed    Follow-up with PCP  Follow-up tests/labs BMP in one week    Follow-up Information    None       ________________________________________________________________    Risk of deterioration: Moderate    Condition at Discharge:  Stable  __________________________________________________________________    Disposition  Home with family, no needs    ____________________________________________________________________    Code Status: Full Code  ___________________________________________________________________      Total time in minutes spent coordinating this discharge (includes going over instructions, follow-up, prescriptions, and preparing report for sign off to her PCP) :  40 minutes    Signed:  Shruti Wills MD

## 2019-08-29 NOTE — PROGRESS NOTES
[facility].       8/29/2019      RE: Lc Grey      To Whom it May Concern: This is to certify that Lc Grey was admitted to hospital on 8/26/2019 and discharged on 8/29/2019. Thank you for your assistance in this matter.     Sincerely,      Ralph Goldman MD

## 2019-08-29 NOTE — ROUTINE PROCESS
Pt's IV discontinued as pt is being discharged. Catheter tip intact, no redness or swelling noted at insertion site. Band-aid applied. Pt discharged per MD order. Pt has all personal belongings, prescriptions, and discharge instructions. Discharge instructions gone over with pt. Pt does not have any further questions regarding discharge.

## 2019-09-01 LAB
BACTERIA SPEC CULT: NORMAL
SERVICE CMNT-IMP: NORMAL

## 2019-12-19 ENCOUNTER — HOSPITAL ENCOUNTER (EMERGENCY)
Age: 44
Discharge: HOME | End: 2019-12-20
Attending: EMERGENCY MEDICINE
Payer: MEDICAID

## 2019-12-19 DIAGNOSIS — E66.01 MORBID OBESITY WITH BODY MASS INDEX (BMI) OF 60.0 TO 69.9 IN ADULT (HCC): ICD-10-CM

## 2019-12-19 DIAGNOSIS — I89.0 LYMPHEDEMA OF BOTH LOWER EXTREMITIES: Primary | ICD-10-CM

## 2019-12-19 PROCEDURE — 99283 EMERGENCY DEPT VISIT LOW MDM: CPT

## 2019-12-20 VITALS
RESPIRATION RATE: 22 BRPM | HEIGHT: 62 IN | BODY MASS INDEX: 57.97 KG/M2 | SYSTOLIC BLOOD PRESSURE: 129 MMHG | DIASTOLIC BLOOD PRESSURE: 77 MMHG | WEIGHT: 315 LBS | OXYGEN SATURATION: 99 % | HEART RATE: 86 BPM | TEMPERATURE: 97.3 F

## 2019-12-20 PROCEDURE — 74011250637 HC RX REV CODE- 250/637: Performed by: EMERGENCY MEDICINE

## 2019-12-20 RX ORDER — FUROSEMIDE 40 MG/1
40 TABLET ORAL DAILY
Qty: 30 TAB | Refills: 1 | Status: ON HOLD | OUTPATIENT
Start: 2019-12-20 | End: 2022-10-21 | Stop reason: SDUPTHER

## 2019-12-20 RX ORDER — FUROSEMIDE 40 MG/1
40 TABLET ORAL
Status: COMPLETED | OUTPATIENT
Start: 2019-12-20 | End: 2019-12-20

## 2019-12-20 RX ADMIN — FUROSEMIDE 40 MG: 40 TABLET ORAL at 00:20

## 2019-12-20 NOTE — DISCHARGE INSTRUCTIONS
Use ACE wraps to both legs to provide compression and also keep your legs elevated whenever possible. Use Lasix 40 mg once or twice daily. Follow up with your doctor. You will also benefit from losing weight. Your weight is extremely high for your height. Your doctor can advise you about options including bariatric surgery. They do this surgery at [facility].

## 2019-12-20 NOTE — ED PROVIDER NOTES
EMERGENCY DEPARTMENT HISTORY AND PHYSICAL EXAM      Date: 2019  Patient Name: Bela Garcia  Patient Age and Sex: 38 y.o. male    History of Presenting Illness     Chief Complaint   Patient presents with    Leg Pain     Ambulatory to triage c/o BLE pain, tenderness and swelling x1 month. Steady gait to triage. on lasix-reports compliance. Denies injury/trauma/falls/CP/SOB. h/o ELIF-no cpap. smoker. History Provided By: Patient    Ability to gather history was limited by: none    HPI: Bela Garcia, 38 y.o. male with history of morbid obesity, BMI of 62, complains of bilateral lower extremity pain. He states he has had increasing swelling in the bilateral legs, especially the calves and lower legs, over the past month or so. No fevers or chills. Pain is equal bilaterally. History of questionable cellulitis of the legs in the past.  No shortness of breath. Location: Bilateral lower legs  Quality:    Burning  Severity: Severe  Duration: 1 month  Timing:    Constant, recurrent issue  Context: Morbid obesity  Modifying factors: Worsened by ambulating  Associated symptoms: None    Pt denies any other alleviating or exacerbating factors. There are no other complaints, changes or physical findings at this time. Past Medical History:   Diagnosis Date    Sleep apnea 2018     No past surgical history on file. PCP: Melvin Spencer MD    Past History     Past Medical History:  Past Medical History:   Diagnosis Date    Sleep apnea 2018       Past Surgical History:  No past surgical history on file. Family History:  No family history on file.     Social History:  Social History     Tobacco Use    Smoking status: Former Smoker     Packs/day: 0.50     Years: 5.00     Pack years: 2.50     Last attempt to quit: 2018     Years since quittin.1    Smokeless tobacco: Never Used   Substance Use Topics    Alcohol use: No    Drug use: Yes     Frequency: 5.0 times per week     Types: Marijuana Allergies:  No Known Allergies    Current Medications:  No current facility-administered medications on file prior to encounter. Current Outpatient Medications on File Prior to Encounter   Medication Sig Dispense Refill    [DISCONTINUED] furosemide (LASIX) 40 mg tablet Take 40 mg by mouth daily. 1       Review of Systems   Review of Systems   Cardiovascular: Positive for leg swelling. All other systems reviewed and are negative. Physical Exam   Vital Signs  Patient Vitals for the past 24 hrs:   Temp Pulse Resp BP SpO2   12/19/19 2249 97.3 °F (36.3 °C) 83 22 134/73 98 %       Physical Exam  Vitals signs and nursing note reviewed. Constitutional:       General: He is not in acute distress. Appearance: He is well-developed. He is morbidly obese. Comments: Morbidly obese   HENT:      Head: Normocephalic and atraumatic. Eyes:      General:         Right eye: No discharge. Left eye: No discharge. Conjunctiva/sclera: Conjunctivae normal.   Neck:      Musculoskeletal: Normal range of motion and neck supple. Cardiovascular:      Rate and Rhythm: Normal rate and regular rhythm. Heart sounds: Normal heart sounds. No murmur. Pulmonary:      Effort: Pulmonary effort is normal. No respiratory distress. Breath sounds: Normal breath sounds. No wheezing. Abdominal:      General: There is no distension. Palpations: Abdomen is soft. Tenderness: There is no tenderness. Musculoskeletal: Normal range of motion. General: No deformity. Right lower leg: He exhibits tenderness. Edema present. Legs:       Comments: 2+ bilateral lower extremity edema. Diffuse tenderness over bilateral lower legs. Skin:     General: Skin is warm and dry. Findings: No rash. Neurological:      Mental Status: He is alert and oriented to person, place, and time. Psychiatric:         Behavior: Behavior normal.         Thought Content:  Thought content normal. Diagnostic Study Results   Labs  No results found for this or any previous visit (from the past 24 hour(s)). Radiologic Studies  No orders to display     CT Results  (Last 48 hours)    None        CXR Results  (Last 48 hours)    None          Procedures   Procedures    Medical Decision Making     Provider Notes (Medical Decision Making):   50-year-old male with morbid obesity complaining of acute on chronic bilateral lower extremity edema and pain. On exam the bilateral lower legs are quite tense. He seems to have a combination of edema and lymphedema. There is no clinical evidence of cellulitis or acute infection. The legs are not concerningly warm or erythematous. His physical exam findings are equal bilaterally. He really needs to lose significant amounts of weight, would probably benefit from bariatric surgery. His BMI is 62 (!). For now I have increased his Lasix to twice daily and he will use Ace wraps for compression, follow-up PMD.    Celia Rizo MD  12:13 AM        Consult required? No      Medications Administered During ED Course:  Medications   furosemide (LASIX) tablet 40 mg (has no administration in time range)          Diagnosis and Disposition     Disposition:  Discharged    Clinical Impression:   1. Lymphedema of both lower extremities    2. Morbid obesity with body mass index (BMI) of 60.0 to 69.9 in adult [facility])        Attestation:  I personally performed the services described in this documentation on this date 12/19/2019 for patient Smiley Robledo. Celia Rizo MD        I was the first provider for this patient on this visit. To the best of my ability I reviewed relevant prior medical records, electrocardiograms, laboratories, and radiologic studies. The patient's presenting problems were discussed, and the patient was in agreement with the care plan formulated and outlined with them.       Celia Rizo MD    Please note that this dictation was completed with Dragon voice recognition software. Quite often unanticipated grammatical, syntax, homophones, and other interpretive errors are inadvertently transcribed by the computer software. Please disregard these errors and excuse any errors that have escaped final proofreading.

## 2019-12-20 NOTE — ED NOTES
Patient was provided with discharge instructions. Instructions and any medications were reviewed with the patient &/or family by the provider. Questions and concerns addressed by the provider. Patient was ambulatory out of the ED and was escorted by self.

## 2020-09-17 ENCOUNTER — OFFICE VISIT (OUTPATIENT)
Dept: INTERNAL MEDICINE CLINIC | Age: 45
End: 2020-09-17

## 2020-09-17 VITALS
OXYGEN SATURATION: 95 % | RESPIRATION RATE: 16 BRPM | HEART RATE: 86 BPM | HEIGHT: 62 IN | TEMPERATURE: 98.2 F | SYSTOLIC BLOOD PRESSURE: 111 MMHG | WEIGHT: 315 LBS | DIASTOLIC BLOOD PRESSURE: 72 MMHG | BODY MASS INDEX: 57.97 KG/M2

## 2020-09-17 DIAGNOSIS — M54.41 CHRONIC RIGHT-SIDED LOW BACK PAIN WITH RIGHT-SIDED SCIATICA: Primary | ICD-10-CM

## 2020-09-17 DIAGNOSIS — Z00.00 WELL ADULT HEALTH CHECK: ICD-10-CM

## 2020-09-17 DIAGNOSIS — E66.01 OBESITY, MORBID (HCC): ICD-10-CM

## 2020-09-17 DIAGNOSIS — Z76.89 ENCOUNTER TO ESTABLISH CARE: ICD-10-CM

## 2020-09-17 DIAGNOSIS — Z23 ENCOUNTER FOR IMMUNIZATION: ICD-10-CM

## 2020-09-17 DIAGNOSIS — G47.30 SLEEP APNEA, UNSPECIFIED TYPE: ICD-10-CM

## 2020-09-17 DIAGNOSIS — Z23 NEEDS FLU SHOT: ICD-10-CM

## 2020-09-17 DIAGNOSIS — G89.29 CHRONIC RIGHT-SIDED LOW BACK PAIN WITH RIGHT-SIDED SCIATICA: Primary | ICD-10-CM

## 2020-09-17 PROCEDURE — 90686 IIV4 VACC NO PRSV 0.5 ML IM: CPT

## 2020-09-17 PROCEDURE — 99204 OFFICE O/P NEW MOD 45 MIN: CPT | Performed by: INTERNAL MEDICINE

## 2020-09-17 PROCEDURE — 90715 TDAP VACCINE 7 YRS/> IM: CPT

## 2020-09-17 RX ORDER — GABAPENTIN 600 MG/1
TABLET ORAL
COMMUNITY
Start: 2020-08-03

## 2020-09-17 NOTE — PROGRESS NOTES
RM 18    Patient not fasting. Patient refused flu vaccine. Chief Complaint   Patient presents with    New Patient    Establish Care    Back Pain     Patient reports sciatic nerve pain to right leg and back. 1. Have you been to the ER, urgent care clinic since your last visit? Hospitalized since your last visit? No    2. Have you seen or consulted any other health care providers outside of the [address] [place] [address] since your last visit? Include any pap smears or colon screening. No Dr. Flaquita Freeman, 3 weeks ago, referred. Health Maintenance Due   Topic Date Due    DTaP/Tdap/Td series (1 - Tdap) 04/29/1997    Lipid Screen  05/07/2018    Flu Vaccine (1) 09/01/2020       Abuse Screening Questionnaire 9/17/2020   Do you ever feel afraid of your partner? N   Are you in a relationship with someone who physically or mentally threatens you? N   Is it safe for you to go home? Y       3 most recent PHQ Screens 9/17/2020   Little interest or pleasure in doing things Not at all   Feeling down, depressed, irritable, or hopeless Not at all   Total Score PHQ 2 0       Learning Assessment 9/17/2020   PRIMARY LEARNER Patient   BARRIERS PRIMARY LEARNER NONE   PRIMARY LANGUAGE ENGLISH   LEARNER PREFERENCE PRIMARY DEMONSTRATION   ANSWERED BY patient   RELATIONSHIP SELF     Immunizations administered 9/17/2020 by [place] Enamorado LPN with patient's consent. Patient tolerated procedure well. No reactions noted. VIS provided.

## 2020-09-17 NOTE — PATIENT INSTRUCTIONS
1.  Please follow the following instructions to process/authorize your referral, if needed:    Referrals processing  Please verify with your insurance IF you need referral authorization submitted. For insurance plans which require this, please follow the following steps. FAILURE TO DO SO MAY RESULT IN INABILITY TO SEE THE SPECIALIST YOU HAVE BEEN REFERRED TO (once you are scheduled to see them). 1. Call and schedule appointment with specialist  2. Call our clinic and leave message with provider name, and date of appointment  3. We will then submit the referral to your insurance. This process takes 2-5 business days. If you have questions about scheduling or authorizing referral, you can review with our referral coordinator. You can review with her today if available/if you have time, or you can call to review once you have made your referral/appointment. If you are not sure if you need referral authorizations, please review with the referral coordinator(s), either prior to or after you have made the appointment, as reviewed. 2.  Please call the [facility] to set up an appointment--you can do this directly, without a referral.  Their number is [phone]. [facility] also has a program with [employer] to hike a Dietitian\". You can have a complimentary conversation by calling [facility] at [phone]. --When prompted, enter code [[email or web address]]. --They are available Monday through Friday 9AM to 5PM EST. You can discuss nutrition topics below with them:  --general diet and nutrition,   --help manage blood sugar and weight. If problems, please call our clinic Nurses for assistance or alternative resources.

## 2020-09-17 NOTE — PROGRESS NOTES
History of Present Illness: Lili García is a 41 y.o. male here for evaluation:    Chief Complaint   Patient presents with    New Patient    Establish Care    Back Pain     Patient reports sciatic nerve pain to right leg and back. Notes (nursing/rooming note copied below in italics):  Patient not fasting. Patient refused flu vaccine. Here to establish care. CareEverywhere Ortho records reviewed, with pain mgt note from April 2019. April 2019 visit was with pain mgt Dr. Merry Braun for spinal injection right L4, right L5. He last saw ortho 8-24-20 for procedure/pain mgt--also with Vivian. With Aug 2020 visit, he had another injection with Dr. Merry Braun. Prescription Monitoring Program ([place]) database query with fills:  08/03/2020  5   08/03/2020  Gabapentin 600 MG Tablet  90.00  30 An Kys   [id number]   Vir (1690)   [place]   08/03/2020  5   08/03/2020  Lorazepam 1 MG Tablet  2.00  1 An Kys   [id number]   Vir (1690)   0  2.00 LME  [place]   05/20/2020  6   05/20/2020  Lorazepam 1 MG Tablet  2.00  1 Ch Gib   [id number]   Vir (1494)   0  2.00 L[place]   05/20/2020  6   05/20/2020  Hydrocodone-Acetamin 5-325 MG  20.00  5 Ch Gib   [id number]   Vir (1494)   0  20.00 M[place]   05/20/2020  6   05/20/2020  Gabapentin 600 MG Tablet  90.00  30 Ch Gib   [id number]   Vir (1494)   [place]   [place]       He sees Vivian for sciatica nerve pain--last 2 injections have not helped, so planning MRI there. He will use furosemide PRN for his swelling. He will take daily when needed. He has not needed for past few weeks. He will take daily for up to 6mo for swelling. He has dx of lymphedema in legs, and has seen vascular--seen last year--recommended elevation, compression socks. The vascular provider stopped seeing him when his insurance stopped. Nursing screenings reviewed by provider at visit.       Past Medical History:   Diagnosis Date    Sleep apnea 2018     History reviewed. No pertinent surgical history. Prior to Admission medications    Medication Sig Start Date End Date Taking? Authorizing Provider   gabapentin (NEURONTIN) 600 mg tablet TAKE 1 CAPSULE FOR 2 DAYS, THEN 2 CAPS AT BEDTIME FOR 2 DAYS, THEN 1 IN MORNING AND 2 IN EVENING 8/3/20  Yes Provider, Historical   furosemide (LASIX) 40 mg tablet Take 1 Tab by mouth daily. 12/20/19  Yes Denise Salvador MD        ROS  Complete ROS negative except as indicated in note. Vitals:    09/17/20 1441   BP: 111/72   Pulse: 86   Resp: 16   Temp: 98.2 °F (36.8 °C)   TempSrc: Oral   SpO2: 95%   Weight: 318 lb 6 oz (144.4 kg)   Height: 5' 2\" (1.575 m)   PainSc:   0 - No pain      Body mass index is 58.23 kg/m². Physical Exam:     Physical Exam  Vitals signs and nursing note reviewed. Constitutional:       General: He is not in acute distress. Appearance: Normal appearance. He is well-developed. He is not diaphoretic. Comments: Kostas Bonds asleep during wait time for provider with mild-moderate snoring   HENT:      Head: Normocephalic and atraumatic. Mouth/Throat:      Mouth: Mucous membranes are moist.   Eyes:      General: No scleral icterus. Right eye: No discharge. Left eye: No discharge. Conjunctiva/sclera: Conjunctivae normal.   Cardiovascular:      Rate and Rhythm: Normal rate and regular rhythm. Pulses: Normal pulses. Heart sounds: Normal heart sounds. No murmur. No friction rub. No gallop. Pulmonary:      Effort: Pulmonary effort is normal. No respiratory distress. Breath sounds: Normal breath sounds. No stridor. No wheezing, rhonchi or rales. Abdominal:      General: Bowel sounds are normal. There is no distension. Palpations: Abdomen is soft. Tenderness: There is no abdominal tenderness. There is no guarding or rebound. Musculoskeletal:         General: No swelling, tenderness or deformity.       Comments: 1-2+ pitting edema bilat. Skin:     General: Skin is warm. Coloration: Skin is not jaundiced or pale. Findings: No bruising, erythema or rash. Neurological:      General: No focal deficit present. Mental Status: He is alert. Motor: No abnormal muscle tone. Coordination: Coordination normal.      Gait: Gait normal.   Psychiatric:         Mood and Affect: Mood normal.         Behavior: Behavior normal.         Thought Content: Thought content normal.         Judgment: Judgment normal.           Assessment and Plan:       ICD-10-CM ICD-9-CM    1. Chronic right-sided low back pain with right-sided sciatica  M54.41 724.2 REFERRAL TO PAIN MANAGEMENT    G89.29 724.3      338.29    2. Obesity, morbid ([facility] [address]  E66.01 278.01    3. Encounter for immunization  Z23 V03.89 TETANUS, DIPHTHERIA TOXOIDS AND ACELLULAR PERTUSSIS VACCINE (TDAP), IN INDIVIDS. >=7, IM   4. Encounter to establish care  Z76.89 V65.8    5. Needs flu shot  Z23 V04.81 INFLUENZA VIRUS VAC QUAD,SPLIT,PRESV FREE SYRINGE IM   6. Well adult health check  Z00.00 V70.0 CBC WITH AUTOMATED DIFF      METABOLIC PANEL, COMPREHENSIVE      HEMOGLOBIN A1C WITH EAG      LIPID PANEL   7. Sleep apnea, unspecified type  G47.30 780.57 SLEEP MEDICINE REFERRAL       1,7. Referral(s) and referral coordination reviewed with patient at visit.    3,5. Immunizations reviewed and updated at visit. 6.  Future fasting labs reviewed. Follow-up and Dispositions    · Return in about 4 weeks (around 10/15/2020) for yearly physical; Labs here 1-2 weeks prior to visit--fasting labs. lab results and schedule of future lab studies reviewed with patient  reviewed medications and side effects in detail    For additional documentation of information and/or recommendations discussed this visit, please see notes in instructions.       Plan and evaluation (above) reviewed with pt at visit  Patient voiced understanding of plan and provided with time to ask/review questions. After Visit Summary (AVS) provided to pt after visit with additional instructions as needed/reviewed.         Future Appointments   Date Time Provider Flori Melany   10/9/2020  9:30 AM LAB CPJOSE ELIAS [facility] BS AMB   10/12/2020  3:20 PM Yisel Gannon MD [facility] BS AMB   10/19/2020 10:00 AM Rizwan Moraes MD [facility] BS AMB

## 2020-10-15 ENCOUNTER — TELEPHONE (OUTPATIENT)
Dept: SLEEP MEDICINE | Age: 45
End: 2020-10-15

## 2020-10-16 ENCOUNTER — VIRTUAL VISIT (OUTPATIENT)
Dept: SLEEP MEDICINE | Age: 45
End: 2020-10-16
Payer: MEDICAID

## 2020-10-16 DIAGNOSIS — G47.33 OBSTRUCTIVE SLEEP APNEA (ADULT) (PEDIATRIC): Primary | ICD-10-CM

## 2020-10-16 PROCEDURE — 99203 OFFICE O/P NEW LOW 30 MIN: CPT | Performed by: INTERNAL MEDICINE

## 2020-10-16 NOTE — Clinical Note
Thank you for the referral.  I will keep you informed of his progress.   [address],  Lorrie Almanza

## 2020-10-16 NOTE — PATIENT INSTRUCTIONS
[address] [facility] [place]. [address] [facility], [address] Tel.  [phone] Fax. 100 [facility] [address] [place], [address] [facility] Tel.  [phone] Fax. [phone] [address] [facility] [place] Tel.  [phone] Fax. [phone] Sleep Apnea: After Your Visit Your Care Instructions Sleep apnea occurs when you frequently stop breathing for 10 seconds or longer during sleep. It can be mild to severe, based on the number of times per hour that you stop breathing or have slowed breathing. Blocked or narrowed airways in your nose, mouth, or throat can cause sleep apnea. Your airway can become blocked when your throat muscles and tongue relax during sleep. Sleep apnea is common, occurring in 1 out of 20 individuals. Individuals having any of the following characteristics should be evaluated and treated right away due to high risk and detrimental consequences from untreated sleep apnea: 
1. Obesity 2. Congestive Heart failure 3. Atrial Fibrillation 4. Uncontrolled Hypertension 5. Type II Diabetes 6. Night-time Arrhythmias 7. Stroke 8. Pulmonary Hypertension 9. High-risk Driving Populations (pilots, truck drivers, etc.) 10. Patients Considering Weight-loss Surgery How do you know you have sleep apnea? You probably have sleep apnea if you answer 'yes' to 3 or more of the following questions: S - Have you been told that you Snore? T - Are you often Tired during the day? O - Has anyone Observed you stop breathing while sleeping? P- Do you have (or are being treated for) high blood Pressure? B - Are you obese (Body Mass Index > 35)? A - Is your Age 48years old or older? N - Is your Neck size greater than 16 inches? G - Are you male Gender? A sleep physician can prescribe a breathing device that prevents tissues in the throat from blocking your airway.  Or your doctor may recommend using a dental device (oral breathing device) to help keep your airway open. In some cases, surgery may be needed to remove enlarged tissues in the throat. Follow-up care is a key part of your treatment and safety. Be sure to make and go to all appointments, and call your doctor if you are having problems. It's also a good idea to know your test results and keep a list of the medicines you take. How can you care for yourself at home? · Lose weight, if needed. It may reduce the number of times you stop breathing or have slowed breathing. · Go to bed at the same time every night. · Sleep on your side. It may stop mild apnea. If you tend to roll onto your back, sew a pocket in the back of your pajama top. Put a tennis ball into the pocket, and stitch the pocket shut. This will help keep you from sleeping on your back. · Avoid alcohol and medicines such as sleeping pills and sedatives before bed. · Do not smoke. Smoking can make sleep apnea worse. If you need help quitting, talk to your doctor about stop-smoking programs and medicines. These can increase your chances of quitting for good. · Prop up the head of your bed 4 to 6 inches by putting bricks under the legs of the bed. · Treat breathing problems, such as a stuffy nose, caused by a cold or allergies. · Use a continuous positive airway pressure (CPAP) breathing machine if lifestyle changes do not help your apnea and your doctor recommends it. The machine keeps your airway from closing when you sleep. · If CPAP does not help you, ask your doctor whether you should try other breathing machines. A bilevel positive airway pressure machine has two types of air pressureâone for breathing in and one for breathing out. Another device raises or lowers air pressure as needed while you breathe. · If your nose feels dry or bleeds when using one of these machines, talk with your doctor about increasing moisture in the air. A humidifier may help.  
· If your nose is runny or stuffy from using a breathing machine, talk with your doctor about using decongestants or a corticosteroid nasal spray. When should you call for help? Watch closely for changes in your health, and be sure to contact your doctor if: 
· You still have sleep apnea even though you have made lifestyle changes. · You are thinking of trying a device such as CPAP. · You are having problems using a CPAP or similar machine. Where can you learn more? Go to [employer]. Enter H005 in the search box to learn more about \"Sleep Apnea: After Your Visit. \"  
© 1270-5906 Healthwise, Incorporated. Care instructions adapted under license by [employer] (which disclaims liability or warranty for this information). This care instruction is for use with your licensed healthcare professional. If you have questions about a medical condition or this instruction, always ask your healthcare professional. Hai Tierney any warranty or liability for your use of this information. PROPER SLEEP HYGIENE What to avoid · Do not have drinks with caffeine, such as coffee or black tea, for 8 hours before bed. · Do not smoke or use other types of tobacco near bedtime. Nicotine is a stimulant and can keep you awake. · Avoid drinking alcohol late in the evening, because it can cause you to wake in the middle of the night. · Do not eat a big meal close to bedtime. If you are hungry, eat a light snack. · Do not drink a lot of water close to bedtime, because the need to urinate may wake you up during the night. · Do not read or watch TV in bed. Use the bed only for sleeping and sexual activity. What to try · Go to bed at the same time every night, and wake up at the same time every morning. Do not take naps during the day. · Keep your bedroom quiet, dark, and cool. · Get regular exercise, but not within 3 to 4 hours of your bedtime. Vijay Altamirano · Sleep on a comfortable pillow and mattress. · If watching the clock makes you anxious, turn it facing away from you so you cannot see the time. · If you worry when you lie down, start a worry book. Well before bedtime, write down your worries, and then set the book and your concerns aside. · Try meditation or other relaxation techniques before you go to bed. · If you cannot fall asleep, get up and go to another room until you feel sleepy. Do something relaxing. Repeat your bedtime routine before you go to bed again. · Make your house quiet and calm about an hour before bedtime. Turn down the lights, turn off the TV, log off the computer, and turn down the volume on music. This can help you relax after a busy day. Drowsy Driving [employer] cites drowsiness as a causing factor in more than 362,143 police reported crashes annually, resulting in 76,000 injuries and 1,500 deaths. Other surveys suggest 55% of people polled have driven while drowsy in the past year, 23% had fallen asleep but not crashed, 3% crashed, and 2% had and accident due to drowsy driving. Who is at risk? Young Drivers: One study of drowsy driving accidents states that 55% of the drivers were under 25 years. Of those, 75% were male. Shift Workers and Travelers: People who work overnight or travel across time zones frequently are at higher risk of experiencing Circadian Rhythm Disorders. They are trying to work and function when their body is programed to sleep. Sleep Deprived: Lack of sleep has a serious impact on your ability to pay attention or focus on a task. Consistently getting less than the average of 8 hours your body needs creates partial or cumulative sleep deprivation. Untreated Sleep Disorders: Sleep Apnea, Narcolepsy, R.L.S., and other sleep disorders (untreated) prevent a person from getting enough restful sleep.  This leads to excessive daytime sleepiness and increases the risk for drowsy driving accidents by up to 7 times. Medications / Alcohol: Even over the counter medications can cause drowsiness. Medications that impair a drivers attention should have a warning label. Alcohol naturally makes you sleepy and on its own can cause accidents. Combined with excessive drowsiness its effects are amplified. Signs of Drowsy Driving: * You don't remember driving the last few miles * You may drift out of your garry * You are unable to focus and your thoughts wander * You may yawn more often than normal 
 * You have difficulty keeping your eyes open / nodding off * Missing traffic signs, speeding, or tailgating Prevention-  
Good sleep hygiene, lifestyle and behavioral choices have the most impact on drowsy driving. There is no substitute for sleep and the average person requires 8 hours nightly. If you find yourself driving drowsy, stop and sleep. Consider the sleep hygiene tips provided during your visit as well. Medication Refill Policy: Refills for all medications require 1 week advance notice. Please have your pharmacy fax a refill request. We are unable to fax, or call in \"controled substance\" medications and you will need to pick these prescriptions up from our office. [employer] Activation Thank you for requesting access to [employer]. Please follow the instructions below to securely access and download your online medical record. [employer] allows you to send messages to your doctor, view your test results, renew your prescriptions, schedule appointments, and more. How Do I Sign Up? 1. In your internet browser, go to [email or web address]. 2. Click on the First Time User? Click Here link in the Sign In box. You will see the New Member Sign Up page. 3. Enter your [employer] Access Code exactly as it appears below. You will not need to use this code after youve completed the sign-up process.  If you do not sign up before the expiration date, you must request a new code. [employer] Access Code: Activation code not generated Current [employer] Status: Active (This is the date your [employer] access code will ) 4. Enter the last four digits of your Social Security Number (xxxx) and Date of Birth (mm/dd/yyyy) as indicated and click Submit. You will be taken to the next sign-up page. 5. Create a Vi[employer] ID. This will be your [employer] login ID and cannot be changed, so think of one that is secure and easy to remember. 6. Create a [employer] password. You can change your password at any time. 7. Enter your Password Reset Question and Answer. This can be used at a later time if you forget your password. 8. Enter your e-mail address. You will receive e-mail notification when new information is available in [address] 9. Click Sign Up. You can now view and download portions of your medical record. 10. Click the Download Summary menu link to download a portable copy of your medical information. Additional Information If you have questions, please call [phone]. Remember, [employer] is NOT to be used for urgent needs. For medical emergencies, dial 911.

## 2020-10-16 NOTE — PROGRESS NOTES
[address] [facility] [place]. [place], [address]  Tel.  [phone]  Fax. 100 [facility] [address]  [place], [address] [facility]  Tel.  [phone]  Fax. [phone] [address] [facility] [address] [place]   Tel.  [phone]  Fax. [phone]         Subjective:        Telemedicine visit performed with verbal consent of the patient. Patient called and identity confirmed with 2 patient identifers    Patient was seen at home  Indiana Coe is a 41 y.o. male who was seen by synchronous (real-time) audio-video technology on 10/16/2020. Consent:  He and/or his healthcare decision maker is aware that this patient-initiated Telehealth encounter is the equivalent to a face to face encounter in the sleep disorder center and has provided verbal consent to proceed: Yes    I was at home while conducting this encounter. Indiana Coe is an 41 y.o. male referred for evaluation for a sleep disorder. He complains of snoring, snorting, choking, periods of not breathing associated with excessive daytime sleepiness. Symptoms began several years ago, gradually worsening since that time. He usually can fall asleep in 15 minutes. Family or house members note snoring, periods of not breathing. He denies difficulty falling asleep once awakened. Indiana Coe does wake up frequently at night. He is bothered by waking up too early and left unable to get back to sleep. He actually sleeps about 4 hours at night and wakes up about 2 times during the night. He does work shifts:  First Shift. Shade Fernandez indicates he does get too little sleep at night. His bedtime is 0100. He awakens at 0600. He does not take naps. . He has the following observed behaviors: Loud snoring, Sleep talking;  . Other remarks:      Cine[place] Sleepiness Score: 20 which reflect severe daytime drowsiness.     No Known Allergies      Current Outpatient Medications:     gabapentin (NEURONTIN) 600 mg tablet, TAKE 1 CAPSULE FOR 2 DAYS, THEN 2 CAPS AT BEDTIME FOR 2 DAYS, THEN 1 IN MORNING AND 2 IN EVENING, Disp: , Rfl:     furosemide (LASIX) 40 mg tablet, Take 1 Tab by mouth daily. , Disp: 30 Tab, Rfl: 1     He  has a past medical history of Sleep apnea (2018). He  has no past surgical history on file. He family history includes Diabetes in his mother. He  reports that he quit smoking about 23 months ago. He has a 2.50 pack-year smoking history. He has never used smokeless tobacco. He reports current drug use. Frequency: 5.00 times per week. Drug: Marijuana. He reports that he does not drink alcohol. Review of Systems:  Constitutional:  + weight gain  Eyes:  No blurred vision  CVS:  No significant chest pain  Pulm:  +shortness of breath walking distances or briskly  GI:  No significant nausea or vomiting  :  No significant nocturia  Musculoskeletal:  No significant joint pain at night  Skin:  No significant rashes  Neuro:  No significant dizziness   Psych:  No active mood issues    Sleep Review of Systems: notable for no difficulty falling asleep; infrequent awakenings at night;  Absent dreaming noted; no nightmares ; + early morning headaches; + memory problems; no concentration issues    Objective: There were no vitals taken for this visit.       Vital Signs: (As obtained by patient/caregiver at home)        Constitutional: [x] Appears well-developed and well-nourished [x] No apparent distress      [] Abnormal -     Mental status: [x] Alert and awake  [x] Oriented to person/place/time [x] Able to follow commands    [] Abnormal -     Eyes:   EOM    [x]  Normal    [] Abnormal -   Sclera  [x]  Normal    [] Abnormal -          Discharge [x]  None visible   [] Abnormal -     HENT: [x] Normocephalic, atraumatic  [] Abnormal -   [x] Mouth/Throat: Mucous membranes are moist               Class 3 oropharynx, thick tongue base                  Low-lying soft palate,absent retrognathia    External Ears [x] Normal  [] Abnormal -    Neck: [x] No visualized mass [] Abnormal -     Pulmonary/Chest: [x] Respiratory effort normal   [x] No visualized signs of difficulty breathing or respiratory distress        [] Abnormal -       Neurological:        [x] No Facial Asymmetry (Cranial nerve 7 motor function) (limited exam due to video visit)          [x] No gaze palsy        [] Abnormal -          Skin:        [x] No significant exanthematous lesions or discoloration noted on facial skin         [] Abnormal -            Psychiatric:       [x] Normal Affect [] Abnormal -       Other pertinent observable physical exam findings:-          Assessment:       ICD-10-CM ICD-9-CM    1. Obstructive sleep apnea (adult) (pediatric)  G47.33 327.23 SLEEP STUDY UNATTENDED, 4 CHANNEL         Plan:       * Sleep testing was ordered for initial evaluation. * He was provided information on sleep apnea including coresponding risk factors and the importance of proper treatment. * Treatment options for sleep apnea were reviewed today. Patient agrees to a trial of PAP therapy if indicated. * Counseling was provided regarding proper sleep hygiene, appropriate sleep schedule, need for sleep environment safety and safe driving. * Effect of sleep disturbance on weight was reviewed. We have recommended a dedicated weight loss through appropriate diet and an exercise regimen as significant weight reduction has been shown to reduce severity of obstructive sleep apnea. * Patient agrees to telephone follow-up by sleep technologist shortly after sleep study to review results and plan final management. The treatment plan was reviewed with the patient in detail and reviewed with the patient . he understands that the lead technologist will be calling him  with the results and assisting with the next step in the treatment plan as outlined today during the consultation with me. All of his questions were addressed.        Thank you for allowing us to participate in your patient's medical care. We'll keep you updated on these investigations. Pursuant to the emergency declaration under the [facility], [facility] waiver authority and the [employer] Act, this Virtual  Visit was conducted, with patient's consent, to reduce the patient's risk of exposure to COVID-19 and provide continuity of care for an established patient. Services were provided through a video synchronous discussion virtually to substitute for in-person clinic visit.     Pallavi Enriquez MD    Electronically signed by    Cyndi Nicole MD  Diplomate in Sleep Medicine  [facility]

## 2020-11-19 ENCOUNTER — TELEPHONE (OUTPATIENT)
Dept: SLEEP MEDICINE | Age: 45
End: 2020-11-19

## 2020-11-19 DIAGNOSIS — G47.33 OBSTRUCTIVE SLEEP APNEA (ADULT) (PEDIATRIC): Primary | ICD-10-CM

## 2020-11-19 NOTE — TELEPHONE ENCOUNTER
Results of sleep study in [facility]-drive  Lead tech to convey results to patient  HSAT results in [facility]-drive. Test positive for severe sleep apnea. AHI 40/hour and lowest oxygen saturation was 45%. We had discussed treatment options at initial consultation. Based on the results of the home sleep apnea test, I believe a trial of APAP would be an effective mode of therapy. APAP order attached. he should be seen in the sleep disorder center 4-6 weeks after initiating PAP therapy. The APAP will have modem access so he can call the sleep center if he  has questions/concerns regarding the initial PAP settings. Front staff to Order PAP and call patient and let them know which DME company they should be hearing from after results reviewed with lead support technologist.   Schedule for first adherence visit in 6 weeks.

## 2020-11-20 ENCOUNTER — DOCUMENTATION ONLY (OUTPATIENT)
Dept: SLEEP MEDICINE | Age: 45
End: 2020-11-20

## 2021-01-25 ENCOUNTER — VIRTUAL VISIT (OUTPATIENT)
Dept: SLEEP MEDICINE | Age: 46
End: 2021-01-25
Payer: MEDICAID

## 2021-01-25 DIAGNOSIS — G47.33 OBSTRUCTIVE SLEEP APNEA (ADULT) (PEDIATRIC): Primary | ICD-10-CM

## 2021-01-25 PROCEDURE — 99213 OFFICE O/P EST LOW 20 MIN: CPT | Performed by: INTERNAL MEDICINE

## 2021-01-25 NOTE — PATIENT INSTRUCTIONS
217 [facility] [place]. 1668 [facility], [address] Tel.  [phone] Fax. 100 [facility] 60 1001 [facility] [place], [address] Tel.  [phone] Fax. [phone] [address] Tel.  [phone] Fax. [phone] PROPER SLEEP HYGIENE What to avoid · Do not have drinks with caffeine, such as coffee or black tea, for 8 hours before bed. · Do not smoke or use other types of tobacco near bedtime. Nicotine is a stimulant and can keep you awake. · Avoid drinking alcohol late in the evening, because it can cause you to wake in the middle of the night. · Do not eat a big meal close to bedtime. If you are hungry, eat a light snack. · Do not drink a lot of water close to bedtime, because the need to urinate may wake you up during the night. · Do not read or watch TV in bed. Use the bed only for sleeping and sexual activity. What to try · Go to bed at the same time every night, and wake up at the same time every morning. Do not take naps during the day. · Keep your bedroom quiet, dark, and cool. · Get regular exercise, but not within 3 to 4 hours of your bedtime. Lois Day · Sleep on a comfortable pillow and mattress. · If watching the clock makes you anxious, turn it facing away from you so you cannot see the time. · If you worry when you lie down, start a worry book. Well before bedtime, write down your worries, and then set the book and your concerns aside. · Try meditation or other relaxation techniques before you go to bed. · If you cannot fall asleep, get up and go to another room until you feel sleepy. Do something relaxing. Repeat your bedtime routine before you go to bed again. · Make your house quiet and calm about an hour before bedtime. Turn down the lights, turn off the TV, log off the computer, and turn down the volume on music. This can help you relax after a busy day. Drowsy Driving [facility] 54 cites drowsiness as a causing factor in more than 376,301 police reported crashes annually, resulting in 76,000 injuries and 1,500 deaths. Other surveys suggest 55% of people polled have driven while drowsy in the past year, 23% had fallen asleep but not crashed, 3% crashed, and 2% had and accident due to drowsy driving. Who is at risk? Young Drivers: One study of drowsy driving accidents states that 55% of the drivers were under 25 years. Of those, 75% were male. Shift Workers and Travelers: People who work overnight or travel across time zones frequently are at higher risk of experiencing Circadian Rhythm Disorders. They are trying to work and function when their body is programed to sleep. Sleep Deprived: Lack of sleep has a serious impact on your ability to pay attention or focus on a task. Consistently getting less than the average of 8 hours your body needs creates partial or cumulative sleep deprivation. Untreated Sleep Disorders: Sleep Apnea, Narcolepsy, R.L.S., and other sleep disorders (untreated) prevent a person from getting enough restful sleep. This leads to excessive daytime sleepiness and increases the risk for drowsy driving accidents by up to 7 times. Medications / Alcohol: Even over the counter medications can cause drowsiness. Medications that impair a drivers attention should have a warning label. Alcohol naturally makes you sleepy and on its own can cause accidents. Combined with excessive drowsiness its effects are amplified. Signs of Drowsy Driving: * You don't remember driving the last few miles * You may drift out of your garry * You are unable to focus and your thoughts wander * You may yawn more often than normal 
 * You have difficulty keeping your eyes open / nodding off * Missing traffic signs, speeding, or tailgating Prevention-  
 Good sleep hygiene, lifestyle and behavioral choices have the most impact on drowsy driving. There is no substitute for sleep and the average person requires 8 hours nightly. If you find yourself driving drowsy, stop and sleep. Consider the sleep hygiene tips provided during your visit as well. Medication Refill Policy: Refills for all medications require 1 week advance notice. Please have your pharmacy fax a refill request. We are unable to fax, or call in \"controled substance\" medications and you will need to pick these prescriptions up from our office. PackLinkhart Activation Thank you for requesting access to Selligy. Please follow the instructions below to securely access and download your online medical record. Selligy allows you to send messages to your doctor, view your test results, renew your prescriptions, schedule appointments, and more. How Do I Sign Up? 1. In your internet browser, go to [email or web address]. 2. Click on the First Time User? Click Here link in the Sign In box. You will see the New Member Sign Up page. 3. Enter your Selligy Access Code exactly as it appears below. You will not need to use this code after youve completed the sign-up process. If you do not sign up before the expiration date, you must request a new code. Selligy Access Code: Activation code not generated Current Selligy Status: Active (This is the date your Selligy access code will ) 4. Enter the last four digits of your Social Security Number (xxxx) and Date of Birth (mm/dd/yyyy) as indicated and click Submit. You will be taken to the next sign-up page. 5. Create a Cascade Prodrugt ID. This will be your Selligy login ID and cannot be changed, so think of one that is secure and easy to remember. 6. Create a Selligy password. You can change your password at any time. 7. Enter your Password Reset Question and Answer. This can be used at a later time if you forget your password. 8. Enter your e-mail address. You will receive e-mail notification when new information is available in [address] 9. Click Sign Up. You can now view and download portions of your medical record. 10. Click the Download Summary menu link to download a portable copy of your medical information. Additional Information If you have questions, please call [phone]. Remember, [employer] is NOT to be used for urgent needs. For medical emergencies, dial 911.

## 2021-01-25 NOTE — PROGRESS NOTES
217 [facility] [place]. [place], [address]  Tel.  [phone]  Fax. 100 [facility] 60  [place], 200 [facility]  Tel.  [phone]  Fax. [phone] 9250 [facility] [address]   Tel.  [phone]  Fax. [phone]       Telemedicine visit performed with verbal consent of the patient. Patient called and identity confirmed with 2 patient identifers    Patient was seen at home  Aline Whitfield is a 41 y.o. male who was seen by synchronous (real-time) audio-video technology on 1/25/2021. Consent:  He and/or his healthcare decision maker is aware that this patient-initiated Telehealth encounter is the equivalent to a face to face encounter in the sleep disorder center and has provided verbal consent to proceed: Yes    I was at home while conducting this encounter. Tori Keith is a 41 y.o. male seen at this telemedicine visit for a positive airway pressure follow-up. He reports some problems using the device. He is 3% compliant over the past 30 days. The following problems are identified:    Drowsiness yes Problems exhaling No- he finds it hard to get the mask on when the air is blowing   Snoring no Forget to put on yes   Mask Comfortable yes  Can't fall asleep no   Dry Mouth no Mask falls off no   Air Leaking Yes - when first put it on Frequent awakenings no       Download reviewed. He admits that his sleep has improved on PAP therapy using full mask and regular tubing.(when he has used it). He has not gotten in the habit of putting it on nightly    No Known Allergies    He has a current medication list which includes the following prescription(s): gabapentin and furosemide. .      He  has a past medical history of Sleep apnea (2018). [place] Sleepiness Score: 17   and     which reflect improved sleep quality over therapy time.     O>      Vital Signs: (As obtained by patient/caregiver at home)        Constitutional: [x] Appears well-developed and well-nourished [x] No apparent distress      [] Abnormal -     Mental status: [x] Alert and awake  [x] Oriented to person/place/time [x] Able to follow commands    [] Abnormal -     Eyes:   EOM    [x]  Normal    [] Abnormal -   Sclera  [x]  Normal    [] Abnormal -          Discharge [x]  None visible   [] Abnormal -     HENT: [x] Normocephalic, atraumatic  [] Abnormal -     External Ears [x] Normal  [] Abnormal -    Neck: [x] No visualized mass [] Abnormal -     Pulmonary/Chest: [x] Respiratory effort normal   [x] No visualized signs of difficulty breathing or respiratory distress        [] Abnormal -       Neurological:        [x] No Facial Asymmetry (Cranial nerve 7 motor function) (limited exam due to video visit)          [x] No gaze palsy        [] Abnormal -          Skin:        [x] No significant exanthematous lesions or discoloration noted on facial skin         [] Abnormal -            Psychiatric:       [x] Normal Affect [] Abnormal -        Other pertinent observable physical exam findings:-            A>    ICD-10-CM ICD-9-CM    1. Obstructive sleep apnea (adult) (pediatric)  G47.33 327.23      AHI = 40 (11-20). On CPAP, Resmed :  5-15 cmH2O. Compliant:      no    Therapeutic Response:  Positive    P>     PAP continues to benefit patient and remains necessary for control of his sleep apnea. CPAP setting - he will continue on his current pressure settings. cmH20     He will put on the mask before turning on the airflow  He will start using it nightly and we will see him in a month. * We have recommended a dedicated weight loss through appropriate diet and an exercise regimen as significant weight reduction has been shown to reduce severity of obstructive sleep apnea. * He was asked to contact our office for any problems regarding PAP therapy. * Counseling was provided regarding the importance of regular PAP use and on proper sleep hygiene and safe driving.     * Re-enforced proper and regular cleaning for the device. All of his questions were addressed. Pursuant to the emergency declaration under the [facility] waiver authority and the [employer] Act, this Virtual  Visit was conducted, with patient's consent, to reduce the patient's risk of exposure to COVID-19 and provide continuity of care for an established patient. Services were provided through a video synchronous discussion virtually to substitute for in-person clinic visit.     Tiffanie Lin MD    Electronically signed by    Dean Valenzuela MD  Diplomate in Sleep Medicine  [facility]

## 2021-02-22 ENCOUNTER — VIRTUAL VISIT (OUTPATIENT)
Dept: SLEEP MEDICINE | Age: 46
End: 2021-02-22
Payer: MEDICAID

## 2021-02-22 ENCOUNTER — DOCUMENTATION ONLY (OUTPATIENT)
Dept: SLEEP MEDICINE | Age: 46
End: 2021-02-22

## 2021-02-22 DIAGNOSIS — G47.33 OBSTRUCTIVE SLEEP APNEA (ADULT) (PEDIATRIC): Primary | ICD-10-CM

## 2021-02-22 PROCEDURE — 99213 OFFICE O/P EST LOW 20 MIN: CPT | Performed by: NURSE PRACTITIONER

## 2021-02-22 NOTE — PROGRESS NOTES
Link sent to mobile number, no response. Called patient at mobile number, no answer. Left VM with instructions for Doxy VV and office number to call to reschedule if needed. Link resent to mobile number. No other number.

## 2021-02-22 NOTE — PATIENT INSTRUCTIONS
217 [facility] [place]. 1668 [facility], [address] Tel.  [phone] Fax. 100 [facility] 60 [place], 200 [address] [facility] Tel.  [phone] Fax. [phone] [address] [place] [address] Tel.  [phone] Fax. [phone] Learning About CPAP for Sleep Apnea What is CPAP? CPAP is a small machine that you use at home every night while you sleep. It increases air pressure in your throat to keep your airway open. When you have sleep apnea, this can help you sleep better so you feel much better. CPAP stands for \"continuous positive airway pressure. \" The CPAP machine will have one of the following: · A mask that covers your nose and mouth · Prongs that fit into your nose · A mask that covers your nose only, the most common type. This type is called NCPAP. The N stands for \"nasal.\" Why is it done? CPAP is usually the best treatment for obstructive sleep apnea. It is the first treatment choice and the most widely used. Your doctor may suggest CPAP if you have: · Moderate to severe sleep apnea. · Sleep apnea and coronary artery disease (CAD) or heart failure. How does it help? · CPAP can help you have more normal sleep, so you feel less sleepy and more alert during the daytime. · CPAP may help keep heart failure or other heart problems from getting worse. · NCPAP may help lower your blood pressure. · If you use CPAP, your bed partner may also sleep better because you are not snoring or restless. What are the side effects? Some people who use CPAP have: · A dry or stuffy nose and a sore throat. · Irritated skin on the face. · Sore eyes. · Bloating. If you have any of these problems, work with your doctor to fix them. Here are some things you can try: · Be sure the mask or nasal prongs fit well. · See if your doctor can adjust the pressure of your CPAP. · If your nose is dry, try a humidifier. · If your nose is runny or stuffy, try decongestant medicine or a steroid nasal spray. If these things do not help, you might try a different type of machine. Some machines have air pressure that adjusts on its own. Others have air pressures that are different when you breathe in than when you breathe out. This may reduce discomfort caused by too much pressure in your nose. Where can you learn more? Go to Dragon Innovation.be Enter Y707 in the search box to learn more about \"Learning About CPAP for Sleep Apnea. \"  
© 7068-4564 Healthwise, Incorporated. Care instructions adapted under license by [address] [place] [address] [employer] (which disclaims liability or warranty for this information). This care instruction is for use with your licensed healthcare professional. If you have questions about a medical condition or this instruction, always ask your healthcare professional. [address] any warranty or liability for your use of this information. Content Version: 0.1.62454; Last Revised: January 11, 2010 PROPER SLEEP HYGIENE What to avoid · Do not have drinks with caffeine, such as coffee or black tea, for 8 hours before bed. · Do not smoke or use other types of tobacco near bedtime. Nicotine is a stimulant and can keep you awake. · Avoid drinking alcohol late in the evening, because it can cause you to wake in the middle of the night. · Do not eat a big meal close to bedtime. If you are hungry, eat a light snack. · Do not drink a lot of water close to bedtime, because the need to urinate may wake you up during the night. · Do not read or watch TV in bed. Use the bed only for sleeping and sexual activity. What to try · Go to bed at the same time every night, and wake up at the same time every morning. Do not take naps during the day. · Keep your bedroom quiet, dark, and cool. · Get regular exercise, but not within 3 to 4 hours of your bedtime. Tommy Sam · Sleep on a comfortable pillow and mattress. · If watching the clock makes you anxious, turn it facing away from you so you cannot see the time. · If you worry when you lie down, start a worry book. Well before bedtime, write down your worries, and then set the book and your concerns aside. · Try meditation or other relaxation techniques before you go to bed. · If you cannot fall asleep, get up and go to another room until you feel sleepy. Do something relaxing. Repeat your bedtime routine before you go to bed again. · Make your house quiet and calm about an hour before bedtime. Turn down the lights, turn off the TV, log off the computer, and turn down the volume on music. This can help you relax after a busy day. Drowsy Driving: The [facility] 54 cites drowsiness as a causing factor in more than 670,763 police reported crashes annually, resulting in 76,000 injuries and 1,500 deaths. Other surveys suggest 55% of people polled have driven while drowsy in the past year, 23% had fallen asleep but not crashed, 3% crashed, and 2% had and accident due to drowsy driving. Who is at risk? Young Drivers: One study of drowsy driving accidents states that 55% of the drivers were under 25 years. Of those, 75% were male. Shift Workers and Travelers: People who work overnight or travel across time zones frequently are at higher risk of experiencing Circadian Rhythm Disorders. They are trying to work and function when their body is programed to sleep. Sleep Deprived: Lack of sleep has a serious impact on your ability to pay attention or focus on a task. Consistently getting less than the average of 8 hours your body needs creates partial or cumulative sleep deprivation. Untreated Sleep Disorders: Sleep Apnea, Narcolepsy, R.L.S., and other sleep disorders (untreated) prevent a person from getting enough restful sleep. This leads to excessive daytime sleepiness and increases the risk for drowsy driving accidents by up to 7 times. Medications / Alcohol: Even over the counter medications can cause drowsiness. Medications that impair a drivers attention should have a warning label. Alcohol naturally makes you sleepy and on its own can cause accidents. Combined with excessive drowsiness its effects are amplified. Signs of Drowsy Driving: * You don't remember driving the last few miles * You may drift out of your garry * You are unable to focus and your thoughts wander * You may yawn more often than normal 
 * You have difficulty keeping your eyes open / nodding off * Missing traffic signs, speeding, or tailgating Prevention-  
Good sleep hygiene, lifestyle and behavioral choices have the most impact on drowsy driving. There is no substitute for sleep and the average person requires 8 hours nightly. If you find yourself driving drowsy, stop and sleep. Consider the sleep hygiene tips provided during your visit as well. Medication Refill Policy: Refills for all medications require 1 week advance notice. Please have your pharmacy fax a refill request. We are unable to fax, or call in \"controled substance\" medications and you will need to pick these prescriptions up from our office. [employer] Activation Thank you for requesting access to [employer]. Please follow the instructions below to securely access and download your online medical record. [employer] allows you to send messages to your doctor, view your test results, renew your prescriptions, schedule appointments, and more. How Do I Sign Up? 1. In your internet browser, go to [email or web address] 2. Click on the First Time User? Click Here link in the Sign In box. You will see the New Member Sign Up page. 3. Enter your IZEA Access Code exactly as it appears below. You will not need to use this code after youve completed the sign-up process. If you do not sign up before the expiration date, you must request a new code. MyChart Access Code: Activation code not generated Current IZEA Status: Active (This is the date your MyChart access code will ) 4. Enter the last four digits of your Social Security Number (xxxx) and Date of Birth (mm/dd/yyyy) as indicated and click Submit. You will be taken to the next sign-up page. 5. Create a Booktropet ID. This will be your IZEA login ID and cannot be changed, so think of one that is secure and easy to remember. 6. Create a IZEA password. You can change your password at any time. 7. Enter your Password Reset Question and Answer. This can be used at a later time if you forget your password. 8. Enter your e-mail address. You will receive e-mail notification when new information is available in [address] 9. Click Sign Up. You can now view and download portions of your medical record. 10. Click the Download Summary menu link to download a portable copy of your medical information. Additional Information If you have questions, please call [phone]. Remember, IZEA is NOT to be used for urgent needs. For medical emergencies, dial 911.

## 2021-02-22 NOTE — PROGRESS NOTES
[address] [place]. [address], [address]   Tel.  [phone]   Fax. [address] [facility] [address]   [place], [address] [facility]   Tel.  [phone]   Fax. [phone] [address] [facility] [address]    Tel.  [phone]   Fax. Kevin (: 1975) is a 41 y.o. male, established patient, seen for positive airway pressure follow-up, he has not met initial compliance yet. Rubén Fuller was last seen by Dr. Imelda Osman on 2021, prior notes reviewed in detail. Home sleep test 2020 showed AHI of 40.0/hr with a lowest owest SpO2 of 45%, duration of SpO2 < 88% 316.7 min. ASSESSMENT/PLAN:    ICD-10-CM ICD-9-CM    1. Obstructive sleep apnea (adult) (pediatric)  G47.33 327.23 AMB SUPPLY ORDER   2. Adult BMI 50.0-59.9 kg/sq m (HCC)  Z68.43 V85.43        AHI = 40.0(2020). On APAP :  5-15 cmH2O. Set up 2020. He is not compliant with PAP therapy although when used PAP shows benefit to the patient and remains necessary for control of his sleep apnea. He has been having issues with mask coming off at night and was away last week unable to use device. Follow-up and Dispositions    · Return in about 3 weeks (around 3/15/2021). 1. Sleep Apnea -     Continue on current pressures    * Supplies ordered - nasal pillows mask and heated tubing - he would like to trial a different mask type for better seal.    Orders Placed This Encounter    AMB SUPPLY ORDER     Diagnosis: (G47.33) ELIF (obstructive sleep apnea)  (primary encounter diagnosis)     Replacement Supplies for Positive Airway Pressure Therapy Device:   Duration of need: 99 months.  Nasal Pillows (Replace) 2 per month.  Nasal Interface Mask 1 every 3 months.  Pos Airway pressure chin strap   Headgear 1 every 6 months.  Tubing with heating element 1 every 3 months.  Filter(s) Disposable 2 per month.    Filter(s) Non-Disposable 1 every 6 months.   .    Water Chamber for Humidifier (Replace) 1 every 6 months.      Jennifer ROSSERICK HollinsCHANELL-BC; NPI: [id number]    Electronically signed. Date:- 02/22/21         * Counseling was provided regarding the importance of regular PAP use with emphasis on ensuring sufficient total sleep time, proper sleep hygiene, and safe driving.    * Re-enforced proper and regular cleaning for the device.    * He was asked to contact our office for any problems regarding PAP therapy.    2. Encouraged continued weight management program through appropriate diet and exercise regimen as significant weight reduction has been shown to reduce severity of obstructive sleep apnea. He has been reducing sugar drinks and lost 15 pounds over past couple months.    SUBJECTIVE/OBJECTIVE:    He  is seen today for follow up on PAP device and reports some problems using the device.   The following concerns identified:    Drowsiness yes Problems exhaling no   Snoring no Forget to put on no   Mask Comfortable yes Can't fall asleep no   Dry Mouth no Mask falls off yes   Air Leaking yes Frequent awakenings no       He admits that his sleep has improved on PAP therapy using nasal mask and heated tubing. He notes he does feel better when he uses the device. He is interested in nasal pillows, he is taking the mask off during the night and not realizing until the morning.  We discussed wearing for naps during the day and uses while he watches TV to get use to mask feel.    Review of device download indicates no use in past week - he was out of town unexpected and did not have device with him:  Auto pressure: 5-15 cmH2O; Max Pressure: 14.8 cmH2O;  95th percentile Pressure: 14.5 cmH2O   95th Percentile Leak: 94.6 L/Min   % Used Days >= 4 hours: 3.  Avg hours used:  2.    Therapy Apnea Index averaged over PAP use: 19.9 /hr which reflects improved sleep breathing condition.    [place] Sleepiness Score: 20 and Modified F.O.S.Q. Score Total /  2: 12.5 . We discussed increased PAP usage to help improve daytime sleepiness. Sleep Review of Systems: notable for Negative difficulty falling asleep; Negative awakenings at night; Negative early morning headaches; Negative memory problems; Negative concentration issues; Negative chest pain; Negative shortness of breath; Negative significant joint pain at night; Negative significant muscle pain at night; Negative rashes or itching; Negative heartburn; Negative significant mood issues; occasional afternoon naps but he is dozing a lot    Vitals reported by patient     Patient-Reported Vitals 2/22/2021   Patient-Reported Weight 300   Patient-Reported Pulse -   Patient-Reported Temperature -   Patient-Reported SpO2 -   Patient-Reported Systolic  -   Patient-Reported Diastolic -      Calculated BMI 53    Physical Exam completed by visual and auditory observation of patient with verbal input from patient. General:   Alert, oriented, not in acute distress   Eyes:  Anicteric Sclerae; no obvious strabismus   Nose:  No obvious nasal septum deviation    Neck:   Midline trachea, no visible mass   Chest/Lungs:  Respiratory effort normal, no visualized signs of difficulty breathing or respiratory distress   CVS:  No JVD   Extremities:  No obvious rashes noted on face, neck, or hands   Neuro:  No facial asymmetry, no focal deficits; no obvious tremor    Psych:  Normal affect,  normal countenance     Ellyn Oro is being evaluated by a Virtual Visit (video visit) encounter to address concerns as mentioned above. A caregiver was present when appropriate. Due to this being a TeleHealth encounter (During [facility] public health emergency), evaluation of the following organ systems was limited: Vitals/Constitutional/EENT/Resp/CV/GI//MS/Neuro/Skin/Heme-Lymph-Imm.   Pursuant to the emergency declaration under the [address] [facility] waiver authority and the [place] Resources and Response Supplemental Appropriations Act, this Virtual Visit was conducted with patient's (and/or legal guardian's) consent, to reduce the patient's risk of exposure to COVID-19 and provide necessary medical care. Patient identification was verified at the start of the visit: YES using photo in chart. Patient's phone number [phone] (home) [phone] (cell)  was confirmed for accuracy. He gives permission for messages regarding results and appointments to be left at that number. Services were provided through a video synchronous discussion virtually to substitute for in-person clinic visit. I was in the office while conducting this encounter, patient located at their home or alternate location of their choice. An electronic signature was used to authenticate this note.     -- Cheryl Givens NP, [facility]  02/22/21

## 2021-08-23 ENCOUNTER — APPOINTMENT (OUTPATIENT)
Dept: ULTRASOUND IMAGING | Age: 46
End: 2021-08-23
Attending: EMERGENCY MEDICINE
Payer: MEDICAID

## 2021-08-23 ENCOUNTER — APPOINTMENT (OUTPATIENT)
Dept: GENERAL RADIOLOGY | Age: 46
End: 2021-08-23
Attending: EMERGENCY MEDICINE
Payer: MEDICAID

## 2021-08-23 ENCOUNTER — HOSPITAL ENCOUNTER (EMERGENCY)
Age: 46
Discharge: HOME | End: 2021-08-23
Attending: EMERGENCY MEDICINE
Payer: MEDICAID

## 2021-08-23 VITALS
HEART RATE: 100 BPM | HEIGHT: 63 IN | TEMPERATURE: 98.2 F | OXYGEN SATURATION: 96 % | DIASTOLIC BLOOD PRESSURE: 70 MMHG | RESPIRATION RATE: 18 BRPM | WEIGHT: 315 LBS | SYSTOLIC BLOOD PRESSURE: 130 MMHG | BODY MASS INDEX: 55.81 KG/M2

## 2021-08-23 DIAGNOSIS — L03.115 CELLULITIS OF RIGHT LOWER EXTREMITY: ICD-10-CM

## 2021-08-23 DIAGNOSIS — R60.0 LEG EDEMA, RIGHT: Primary | ICD-10-CM

## 2021-08-23 DIAGNOSIS — L03.115 CELLULITIS OF RIGHT LEG: ICD-10-CM

## 2021-08-23 LAB
ALBUMIN SERPL-MCNC: 3.7 G/DL (ref 3.5–5)
ALBUMIN/GLOB SERPL: 1 {RATIO} (ref 1.1–2.2)
ALP SERPL-CCNC: 75 U/L (ref 45–117)
ALT SERPL-CCNC: 25 U/L (ref 12–78)
ANION GAP SERPL CALC-SCNC: 5 MMOL/L (ref 5–15)
AST SERPL-CCNC: 16 U/L (ref 15–37)
ATRIAL RATE: 97 BPM
BASOPHILS # BLD: 0 K/UL (ref 0–0.1)
BASOPHILS NFR BLD: 1 % (ref 0–1)
BILIRUB SERPL-MCNC: 0.3 MG/DL (ref 0.2–1)
BNP SERPL-MCNC: 9 PG/ML
BUN SERPL-MCNC: 12 MG/DL (ref 6–20)
BUN/CREAT SERPL: 9 (ref 12–20)
CALCIUM SERPL-MCNC: 8.9 MG/DL (ref 8.5–10.1)
CALCULATED P AXIS, ECG09: 47 DEGREES
CALCULATED R AXIS, ECG10: -81 DEGREES
CALCULATED T AXIS, ECG11: 49 DEGREES
CHLORIDE SERPL-SCNC: 104 MMOL/L (ref 97–108)
CK MB CFR SERPL CALC: 0.4 % (ref 0–2.5)
CK MB SERPL-MCNC: 2.2 NG/ML (ref 5–25)
CK SERPL-CCNC: 514 U/L (ref 39–308)
CO2 SERPL-SCNC: 30 MMOL/L (ref 21–32)
CREAT SERPL-MCNC: 1.38 MG/DL (ref 0.7–1.3)
D DIMER PPP FEU-MCNC: 0.35 MG/L FEU (ref 0–0.65)
DIAGNOSIS, 93000: NORMAL
DIFFERENTIAL METHOD BLD: NORMAL
EOSINOPHIL # BLD: 0.1 K/UL (ref 0–0.4)
EOSINOPHIL NFR BLD: 2 % (ref 0–7)
ERYTHROCYTE [DISTWIDTH] IN BLOOD BY AUTOMATED COUNT: 13.4 % (ref 11.5–14.5)
GLOBULIN SER CALC-MCNC: 3.6 G/DL (ref 2–4)
GLUCOSE SERPL-MCNC: 113 MG/DL (ref 65–100)
HCT VFR BLD AUTO: 47.3 % (ref 36.6–50.3)
HGB BLD-MCNC: 15 G/DL (ref 12.1–17)
IMM GRANULOCYTES # BLD AUTO: 0 K/UL (ref 0–0.04)
IMM GRANULOCYTES NFR BLD AUTO: 0 % (ref 0–0.5)
LYMPHOCYTES # BLD: 1.3 K/UL (ref 0.8–3.5)
LYMPHOCYTES NFR BLD: 23 % (ref 12–49)
MCH RBC QN AUTO: 30.7 PG (ref 26–34)
MCHC RBC AUTO-ENTMCNC: 31.7 G/DL (ref 30–36.5)
MCV RBC AUTO: 96.9 FL (ref 80–99)
MONOCYTES # BLD: 0.6 K/UL (ref 0–1)
MONOCYTES NFR BLD: 11 % (ref 5–13)
NEUTS SEG # BLD: 3.5 K/UL (ref 1.8–8)
NEUTS SEG NFR BLD: 63 % (ref 32–75)
NRBC # BLD: 0 K/UL (ref 0–0.01)
NRBC BLD-RTO: 0 PER 100 WBC
P-R INTERVAL, ECG05: 160 MS
PLATELET # BLD AUTO: 190 K/UL (ref 150–400)
PMV BLD AUTO: 10.1 FL (ref 8.9–12.9)
POTASSIUM SERPL-SCNC: 4.3 MMOL/L (ref 3.5–5.1)
PROT SERPL-MCNC: 7.3 G/DL (ref 6.4–8.2)
Q-T INTERVAL, ECG07: 356 MS
QRS DURATION, ECG06: 100 MS
QTC CALCULATION (BEZET), ECG08: 452 MS
RBC # BLD AUTO: 4.88 M/UL (ref 4.1–5.7)
SODIUM SERPL-SCNC: 139 MMOL/L (ref 136–145)
TROPONIN I SERPL-MCNC: <0.05 NG/ML
VENTRICULAR RATE, ECG03: 97 BPM
WBC # BLD AUTO: 5.6 K/UL (ref 4.1–11.1)

## 2021-08-23 PROCEDURE — 74011250636 HC RX REV CODE- 250/636: Performed by: EMERGENCY MEDICINE

## 2021-08-23 PROCEDURE — 99284 EMERGENCY DEPT VISIT MOD MDM: CPT

## 2021-08-23 PROCEDURE — 82550 ASSAY OF CK (CPK): CPT

## 2021-08-23 PROCEDURE — 93005 ELECTROCARDIOGRAM TRACING: CPT

## 2021-08-23 PROCEDURE — 36415 COLL VENOUS BLD VENIPUNCTURE: CPT

## 2021-08-23 PROCEDURE — 83880 ASSAY OF NATRIURETIC PEPTIDE: CPT

## 2021-08-23 PROCEDURE — 71045 X-RAY EXAM CHEST 1 VIEW: CPT

## 2021-08-23 PROCEDURE — 74011250637 HC RX REV CODE- 250/637: Performed by: EMERGENCY MEDICINE

## 2021-08-23 PROCEDURE — 85379 FIBRIN DEGRADATION QUANT: CPT

## 2021-08-23 PROCEDURE — 82553 CREATINE MB FRACTION: CPT

## 2021-08-23 PROCEDURE — 93971 EXTREMITY STUDY: CPT

## 2021-08-23 PROCEDURE — 80053 COMPREHEN METABOLIC PANEL: CPT

## 2021-08-23 PROCEDURE — 85025 COMPLETE CBC W/AUTO DIFF WBC: CPT

## 2021-08-23 PROCEDURE — 96365 THER/PROPH/DIAG IV INF INIT: CPT

## 2021-08-23 PROCEDURE — 84484 ASSAY OF TROPONIN QUANT: CPT

## 2021-08-23 RX ORDER — CLINDAMYCIN PHOSPHATE 600 MG/50ML
600 INJECTION INTRAVENOUS
Status: COMPLETED | OUTPATIENT
Start: 2021-08-23 | End: 2021-08-23

## 2021-08-23 RX ORDER — HYDROCODONE BITARTRATE AND ACETAMINOPHEN 5; 325 MG/1; MG/1
1 TABLET ORAL
Qty: 10 TABLET | Refills: 0 | Status: SHIPPED | OUTPATIENT
Start: 2021-08-23 | End: 2021-08-26

## 2021-08-23 RX ORDER — DOXYCYCLINE HYCLATE 100 MG
100 TABLET ORAL 2 TIMES DAILY
Qty: 14 TABLET | Refills: 0 | Status: SHIPPED | OUTPATIENT
Start: 2021-08-23 | End: 2021-08-30

## 2021-08-23 RX ORDER — HYDROCODONE BITARTRATE AND ACETAMINOPHEN 5; 325 MG/1; MG/1
1 TABLET ORAL
Status: COMPLETED | OUTPATIENT
Start: 2021-08-23 | End: 2021-08-23

## 2021-08-23 RX ADMIN — HYDROCODONE BITARTRATE AND ACETAMINOPHEN 1 TABLET: 5; 325 TABLET ORAL at 14:46

## 2021-08-23 RX ADMIN — CLINDAMYCIN PHOSPHATE 600 MG: 600 INJECTION, SOLUTION INTRAVENOUS at 14:45

## 2021-08-23 NOTE — ED PROVIDER NOTES
EMERGENCY DEPARTMENT HISTORY AND PHYSICAL EXAM      Date: 2021  Patient Name: Sarika Nieto    History of Presenting Illness     Chief Complaint   Patient presents with    Leg Pain     with notable swelling to R leg,  pt states it has been swollen for 3-4 days       History Provided By: Patient    HPI: Sarika Nieto, 40 y.o. male presents to the ED with cc of right leg swelling. Patient has noticed swelling to his right calf for 3 to 4 days. He denies trauma, fever or chills. Pain is a 10 out of 10 in severity and is not relieved with Tylenol or Advil. He denies chest pain, cough, fever or chills. He states he has chronic shortness of breath which she believes is due to his weight. Denies headache, numbness, tingling or weakness. There are no other complaints, changes, or physical findings at this time. PCP: Hussein Sheldon MD    No current facility-administered medications on file prior to encounter. Current Outpatient Medications on File Prior to Encounter   Medication Sig Dispense Refill    gabapentin (NEURONTIN) 600 mg tablet TAKE 1 CAPSULE FOR 2 DAYS, THEN 2 CAPS AT BEDTIME FOR 2 DAYS, THEN 1 IN MORNING AND 2 IN EVENING      furosemide (LASIX) 40 mg tablet Take 1 Tab by mouth daily. 30 Tab 1       Past History     Past Medical History:  Past Medical History:   Diagnosis Date    Sleep apnea        Past Surgical History:  No past surgical history on file.     Family History:  Family History   Problem Relation Age of Onset    Diabetes Mother        Social History:  Social History     Tobacco Use    Smoking status: Former Smoker     Packs/day: 0.50     Years: 5.00     Pack years: 2.50     Quit date: 2018     Years since quittin.7    Smokeless tobacco: Never Used   Substance Use Topics    Alcohol use: No    Drug use: Yes     Frequency: 5.0 times per week     Types: Marijuana       Allergies:  No Known Allergies      Review of Systems   Review of Systems   Constitutional: Negative for fever. HENT: Negative for congestion. Eyes: Negative. Respiratory: Negative for shortness of breath. Cardiovascular: Negative for chest pain. Gastrointestinal: Negative for abdominal pain. Endocrine: Negative for heat intolerance. Genitourinary: Negative for dysuria. Musculoskeletal: Negative for back pain. Skin: Negative for rash. Allergic/Immunologic: Negative for immunocompromised state. Neurological: Negative for dizziness. Hematological: Does not bruise/bleed easily. Psychiatric/Behavioral: Negative. All other systems reviewed and are negative. Physical Exam   Physical Exam  Vitals and nursing note reviewed. Constitutional:       General: He is not in acute distress. Appearance: He is well-developed. HENT:      Head: Normocephalic and atraumatic. Cardiovascular:      Rate and Rhythm: Normal rate and regular rhythm. Pulses: Normal pulses. Heart sounds: Normal heart sounds. Pulmonary:      Effort: Pulmonary effort is normal.      Breath sounds: Normal breath sounds. Abdominal:      General: Bowel sounds are normal.      Palpations: Abdomen is soft. Musculoskeletal:         General: Swelling and tenderness present. Normal range of motion. Cervical back: Normal range of motion. Comments: 2+ distal pulses, slight increased temp and erythema to right calf, positive edema right calf   Skin:     General: Skin is warm and dry. Neurological:      General: No focal deficit present. Mental Status: He is alert and oriented to person, place, and time.       Coordination: Coordination normal.   Psychiatric:         Mood and Affect: Mood normal.         Behavior: Behavior normal.         Diagnostic Study Results     Labs -     Recent Results (from the past 12 hour(s))   EKG, 12 LEAD, INITIAL    Collection Time: 08/23/21 12:29 PM   Result Value Ref Range    Ventricular Rate 97 BPM    Atrial Rate 97 BPM    P-R Interval 160 ms    QRS Duration 100 ms    Q-T Interval 356 ms    QTC Calculation (Bezet) 452 ms    Calculated P Axis 47 degrees    Calculated R Axis -81 degrees    Calculated T Axis 49 degrees    Diagnosis       Normal sinus rhythm  Pulmonary disease pattern  Incomplete right bundle branch block  Left anterior fascicular block  When compared with ECG of 26-AUG-2019 23:41,  No significant change was found     CBC WITH AUTOMATED DIFF    Collection Time: 08/23/21 12:37 PM   Result Value Ref Range    WBC 5.6 4.1 - 11.1 K/uL    RBC 4.88 4.10 - 5.70 M/uL    HGB 15.0 12.1 - 17.0 g/dL    HCT 47.3 36.6 - 50.3 %    MCV 96.9 80.0 - 99.0 FL    MCH 30.7 26.0 - 34.0 PG    MCHC 31.7 30.0 - 36.5 g/dL    RDW 13.4 11.5 - 14.5 %    PLATELET 761 386 - 794 K/uL    MPV 10.1 8.9 - 12.9 FL    NRBC 0.0 0  WBC    ABSOLUTE NRBC 0.00 0.00 - 0.01 K/uL    NEUTROPHILS 63 32 - 75 %    LYMPHOCYTES 23 12 - 49 %    MONOCYTES 11 5 - 13 %    EOSINOPHILS 2 0 - 7 %    BASOPHILS 1 0 - 1 %    IMMATURE GRANULOCYTES 0 0.0 - 0.5 %    ABS. NEUTROPHILS 3.5 1.8 - 8.0 K/UL    ABS. LYMPHOCYTES 1.3 0.8 - 3.5 K/UL    ABS. MONOCYTES 0.6 0.0 - 1.0 K/UL    ABS. EOSINOPHILS 0.1 0.0 - 0.4 K/UL    ABS. BASOPHILS 0.0 0.0 - 0.1 K/UL    ABS. IMM. GRANS. 0.0 0.00 - 0.04 K/UL    DF AUTOMATED     METABOLIC PANEL, COMPREHENSIVE    Collection Time: 08/23/21 12:37 PM   Result Value Ref Range    Sodium 139 136 - 145 mmol/L    Potassium 4.3 3.5 - 5.1 mmol/L    Chloride 104 97 - 108 mmol/L    CO2 30 21 - 32 mmol/L    Anion gap 5 5 - 15 mmol/L    Glucose 113 (H) 65 - 100 mg/dL    BUN 12 6 - 20 MG/DL    Creatinine 1.38 (H) 0.70 - 1.30 MG/DL    BUN/Creatinine ratio 9 (L) 12 - 20      GFR est AA >60 >60 ml/min/1.73m2    GFR est non-AA 56 (L) >60 ml/min/1.73m2    Calcium 8.9 8.5 - 10.1 MG/DL    Bilirubin, total 0.3 0.2 - 1.0 MG/DL    ALT (SGPT) 25 12 - 78 U/L    AST (SGOT) 16 15 - 37 U/L    Alk.  phosphatase 75 45 - 117 U/L    Protein, total 7.3 6.4 - 8.2 g/dL    Albumin 3.7 3.5 - 5.0 g/dL    Globulin 3.6 2.0 - 4.0 g/dL    A-G Ratio 1.0 (L) 1.1 - 2.2     CK W/ REFLX CKMB    Collection Time: 08/23/21 12:37 PM   Result Value Ref Range     (H) 39 - 308 U/L   TROPONIN I    Collection Time: 08/23/21 12:37 PM   Result Value Ref Range    Troponin-I, Qt. <0.05 <0.05 ng/mL   NT-PRO BNP    Collection Time: 08/23/21 12:37 PM   Result Value Ref Range    NT pro-BNP 9 <125 PG/ML   D DIMER    Collection Time: 08/23/21 12:37 PM   Result Value Ref Range    D-dimer 0.35 0.00 - 0.65 mg/L FEU   CK-MB,QUANT. Collection Time: 08/23/21 12:37 PM   Result Value Ref Range    CK - MB 2.2 <3.6 NG/ML    CK-MB Index 0.4 0.0 - 2.5         Radiologic Studies -   DUPLEX LOWER EXT VENOUS RIGHT   Final Result      XR CHEST PORT   Final Result      No acute process on limited portable chest view. Recommend PA and lateral chest   views when the patient can better tolerate. CT Results  (Last 48 hours)    None        CXR Results  (Last 48 hours)               08/23/21 1322  XR CHEST PORT Final result    Impression:      No acute process on limited portable chest view. Recommend PA and lateral chest   views when the patient can better tolerate. Narrative:  EXAM: XR CHEST PORT       INDICATION: Shortness of breath and lower extremity pain. COMPARISON: 8/26/2019 and 2/6/2017. TECHNIQUE: Upright portable chest AP view       FINDINGS: The cardiomediastinal and hilar contours are within normal limits. The   pulmonary vasculature is within normal limits. The lungs and pleural spaces are clear. Limited by body habitus. No displaced   rib fracture. Medical Decision Making   I am the first provider for this patient. I reviewed the vital signs, available nursing notes, past medical history, past surgical history, family history and social history. Vital Signs-Reviewed the patient's vital signs.   Patient Vitals for the past 12 hrs:   Temp Pulse Resp BP SpO2   08/23/21 1455 98.2 °F (36.8 °C) 100 18 130/70 96 %   08/23/21 1214 98.6 °F (37 °C) (!) 102 20 137/76 95 %       EKG interpretation: (Preliminary)  Rhythm: normal sinus rhythm and incomplete right bundle branch block; and regular . Rate (approx.): 97; Axis: normal; WI interval: normal; QRS interval: normal ; ST/T wave: non-specific changes; Other findings: unchanged from previous ekg. Records Reviewed: Nursing Notes and Old Medical Records    Provider Notes (Medical Decision Making):   DVT, Baker's cyst, cellulitis    ED Course:   Initial assessment performed. The patients presenting problems have been discussed, and they are in agreement with the care plan formulated and outlined with them. I have encouraged them to ask questions as they arise throughout their visit. Progress note: The patient is feeling better. He states he only has pain when he touches his calf now. His results were reviewed. He is advised to follow-up and return to ER if worse        Critical Care Time:   0    Disposition:  home    DISCHARGE PLAN:  1. Discharge Medication List as of 8/23/2021  3:22 PM      START taking these medications    Details   doxycycline (VIBRA-TABS) 100 mg tablet Take 1 Tablet by mouth two (2) times a day for 7 days. , Normal, Disp-14 Tablet, R-0      HYDROcodone-acetaminophen (Norco) 5-325 mg per tablet Take 1 Tablet by mouth every six (6) hours as needed for Pain for up to 3 days. Max Daily Amount: 4 Tablets., Normal, Disp-10 Tablet, R-0         CONTINUE these medications which have NOT CHANGED    Details   gabapentin (NEURONTIN) 600 mg tablet TAKE 1 CAPSULE FOR 2 DAYS, THEN 2 CAPS AT BEDTIME FOR 2 DAYS, THEN 1 IN MORNING AND 2 IN EVENING, Historical Med      furosemide (LASIX) 40 mg tablet Take 1 Tab by mouth daily. , Normal, Disp-30 Tab, R-1           2.    Follow-up Information     Follow up With Specialties Details Why Contact Info    Jose R Ortiz MD Infectious Disease  As needed [phone] [id number]  [phone]      [facility] EMERGENCY DEPT Emergency Medicine  If symptoms worsen [address] [place] [address]  [phone]        3. Return to ED if worse     Diagnosis     Clinical Impression:   1. Leg edema, right    2. Cellulitis of right leg    3. Cellulitis of right lower extremity        Attestations:    Rosmery Morris MD    Please note that this dictation was completed with [employer], the computer voice recognition software. Quite often unanticipated grammatical, syntax, homophones, and other interpretive errors are inadvertently transcribed by the computer software. Please disregard these errors. Please excuse any errors that have escaped final proofreading. Thank you.

## 2022-03-19 PROBLEM — L03.90 CELLULITIS: Status: ACTIVE | Noted: 2019-08-27

## 2022-10-04 ENCOUNTER — HOSPITAL ENCOUNTER (EMERGENCY)
Age: 47
Discharge: HOME | End: 2022-10-04
Attending: EMERGENCY MEDICINE
Payer: MEDICAID

## 2022-10-04 ENCOUNTER — APPOINTMENT (OUTPATIENT)
Dept: GENERAL RADIOLOGY | Age: 47
End: 2022-10-04
Attending: PHYSICIAN ASSISTANT
Payer: MEDICAID

## 2022-10-04 VITALS
BODY MASS INDEX: 57.97 KG/M2 | OXYGEN SATURATION: 95 % | TEMPERATURE: 99.3 F | WEIGHT: 315 LBS | DIASTOLIC BLOOD PRESSURE: 94 MMHG | SYSTOLIC BLOOD PRESSURE: 145 MMHG | HEART RATE: 108 BPM | RESPIRATION RATE: 20 BRPM | HEIGHT: 62 IN

## 2022-10-04 DIAGNOSIS — R79.89 ELEVATED SERUM CREATININE: ICD-10-CM

## 2022-10-04 DIAGNOSIS — B34.9 VIRAL SYNDROME: ICD-10-CM

## 2022-10-04 DIAGNOSIS — R50.9 ACUTE FEBRILE ILLNESS: Primary | ICD-10-CM

## 2022-10-04 DIAGNOSIS — E66.01 MORBID OBESITY (HCC): ICD-10-CM

## 2022-10-04 DIAGNOSIS — R03.0 ELEVATED BLOOD PRESSURE READING: ICD-10-CM

## 2022-10-04 LAB
ANION GAP SERPL CALC-SCNC: 8 MMOL/L (ref 5–15)
BASOPHILS # BLD: 0 K/UL (ref 0–0.1)
BASOPHILS NFR BLD: 0 % (ref 0–1)
BUN SERPL-MCNC: 11 MG/DL (ref 6–20)
BUN/CREAT SERPL: 8 (ref 12–20)
CALCIUM SERPL-MCNC: 8.5 MG/DL (ref 8.5–10.1)
CHLORIDE SERPL-SCNC: 97 MMOL/L (ref 97–108)
CO2 SERPL-SCNC: 29 MMOL/L (ref 21–32)
CREAT SERPL-MCNC: 1.32 MG/DL (ref 0.7–1.3)
DEPRECATED S PYO AG THROAT QL EIA: NEGATIVE
DIFFERENTIAL METHOD BLD: ABNORMAL
EOSINOPHIL # BLD: 0.1 K/UL (ref 0–0.4)
EOSINOPHIL NFR BLD: 1 % (ref 0–7)
ERYTHROCYTE [DISTWIDTH] IN BLOOD BY AUTOMATED COUNT: 13.9 % (ref 11.5–14.5)
FLUAV RNA SPEC QL NAA+PROBE: NOT DETECTED
FLUBV RNA SPEC QL NAA+PROBE: NOT DETECTED
GLUCOSE SERPL-MCNC: 100 MG/DL (ref 65–100)
HCT VFR BLD AUTO: 45.1 % (ref 36.6–50.3)
HGB BLD-MCNC: 14.2 G/DL (ref 12.1–17)
IMM GRANULOCYTES # BLD AUTO: 0 K/UL
IMM GRANULOCYTES NFR BLD AUTO: 0 %
LACTATE SERPL-SCNC: 1.1 MMOL/L (ref 0.4–2)
LYMPHOCYTES # BLD: 0.6 K/UL (ref 0.8–3.5)
LYMPHOCYTES NFR BLD: 5 % (ref 12–49)
MCH RBC QN AUTO: 28.9 PG (ref 26–34)
MCHC RBC AUTO-ENTMCNC: 31.5 G/DL (ref 30–36.5)
MCV RBC AUTO: 91.9 FL (ref 80–99)
MONOCYTES # BLD: 0.7 K/UL (ref 0–1)
MONOCYTES NFR BLD: 6 % (ref 5–13)
NEUTS BAND NFR BLD MANUAL: 5 % (ref 0–6)
NEUTS SEG # BLD: 10.4 K/UL (ref 1.8–8)
NEUTS SEG NFR BLD: 83 % (ref 32–75)
NRBC # BLD: 0 K/UL (ref 0–0.01)
NRBC BLD-RTO: 0 PER 100 WBC
PLATELET # BLD AUTO: 152 K/UL (ref 150–400)
PMV BLD AUTO: 10 FL (ref 8.9–12.9)
POTASSIUM SERPL-SCNC: 4.3 MMOL/L (ref 3.5–5.1)
RBC # BLD AUTO: 4.91 M/UL (ref 4.1–5.7)
RBC MORPH BLD: ABNORMAL
SARS-COV-2, COV2: NOT DETECTED
SODIUM SERPL-SCNC: 134 MMOL/L (ref 136–145)
WBC # BLD AUTO: 11.8 K/UL (ref 4.1–11.1)

## 2022-10-04 PROCEDURE — 36415 COLL VENOUS BLD VENIPUNCTURE: CPT

## 2022-10-04 PROCEDURE — 85025 COMPLETE CBC W/AUTO DIFF WBC: CPT

## 2022-10-04 PROCEDURE — 99284 EMERGENCY DEPT VISIT MOD MDM: CPT

## 2022-10-04 PROCEDURE — 96360 HYDRATION IV INFUSION INIT: CPT

## 2022-10-04 PROCEDURE — 80048 BASIC METABOLIC PNL TOTAL CA: CPT

## 2022-10-04 PROCEDURE — 71045 X-RAY EXAM CHEST 1 VIEW: CPT

## 2022-10-04 PROCEDURE — 87070 CULTURE OTHR SPECIMN AEROBIC: CPT

## 2022-10-04 PROCEDURE — 74011250636 HC RX REV CODE- 250/636: Performed by: PHYSICIAN ASSISTANT

## 2022-10-04 PROCEDURE — 87880 STREP A ASSAY W/OPTIC: CPT

## 2022-10-04 PROCEDURE — 87636 SARSCOV2 & INF A&B AMP PRB: CPT

## 2022-10-04 PROCEDURE — 83605 ASSAY OF LACTIC ACID: CPT

## 2022-10-04 PROCEDURE — 74011250637 HC RX REV CODE- 250/637: Performed by: PHYSICIAN ASSISTANT

## 2022-10-04 RX ORDER — CETIRIZINE HCL 10 MG
10 TABLET ORAL DAILY
Qty: 20 TABLET | Refills: 0 | Status: SHIPPED | OUTPATIENT
Start: 2022-10-04

## 2022-10-04 RX ORDER — ACETAMINOPHEN 500 MG
1000 TABLET ORAL
Status: COMPLETED | OUTPATIENT
Start: 2022-10-04 | End: 2022-10-04

## 2022-10-04 RX ORDER — ACETAMINOPHEN 325 MG/1
650 TABLET ORAL
Qty: 20 TABLET | Refills: 0 | Status: SHIPPED | OUTPATIENT
Start: 2022-10-04

## 2022-10-04 RX ADMIN — ACETAMINOPHEN 1000 MG: 500 TABLET, FILM COATED ORAL at 13:58

## 2022-10-04 RX ADMIN — SODIUM CHLORIDE 1000 ML: 9 INJECTION, SOLUTION INTRAVENOUS at 15:25

## 2022-10-04 NOTE — LETTER
[facility] EMERGENCY DEPT  [address] [facility] [facility] [address] [phone]    Work/School Note    Date: 10/4/2022    To Whom It May concern: Cornelia Fortune was seen and treated today in the emergency room by the following provider(s):  Attending Provider: Dewey Wolfe MD  Physician Assistant: MARLENE Delacruz. Cornelia Fortune may return to work once fever free for 24 hours without medication.     Sincerely,          MARLENE Allen

## 2022-10-04 NOTE — PROGRESS NOTES
Date of previous inpatient admission/ ED visit? 8/3/2021    What brought the patient back to ED? Fatigue    Did patient decline recommended services during last admission/ ED visit (if yes, what)? N/A    Has patient seen a provider since their last inpatient admission/ED visit (if yes, when)? Patient goes to [facility] as [facility]    PCP: First and Last name:   Name of Practice:    Are you a current patient: Yes/No:    Approximate date of last visit:    CM Interventions: CM scheduled PCP with Alfonzo Valle MD  Next steps:  Follow up on 10/21/2022  [address]   [phone]  PCP- on 10/21/22 at 10 AM  From previous inpatient admission/ED visit: N/A  From current inpatient admission/ED visit [address], [address] [facility]

## 2022-10-04 NOTE — ED PROVIDER NOTES
EMERGENCY DEPARTMENT HISTORY AND PHYSICAL EXAM      Please note that this dictation was completed with [employer], the computer voice recognition software. Quite often unanticipated grammatical, syntax, homophones, and other interpretive errors are inadvertently transcribed by the computer software. Please disregard these errors. Please excuse any errors that have escaped final proofreading. Date: 10/4/2022  Patient Name: Italia Morin    History of Presenting Illness     Chief Complaint   Patient presents with    Fatigue       History Provided By: Patient    HPI: Italia Morin, 56 y.o. male with history of morbid obesity, lymphedema, ELIF and others presents ambulatory to the ED with cc of 2 days of moderately severe and constant body aches, fatigue, slightly sore throat and fever for which he has seen no lasting improvement with Tylenol. He is unvaccinated against flu and COVID-19. There are no known sick contacts and there has been no recent travel. He denies any significant cough. He denies chest pain or shortness of breath. He denies abdominal pain and there has been no nausea, vomiting or diarrhea. He denies headache or neck pain. He denies any skin rash he is aware of. He tells me his lower legs are always swollen and they are no worse recently. There are no other complaints, changes, or physical findings at this time. PCP: Farnaz Rai MD    Current Outpatient Medications   Medication Sig Dispense Refill    acetaminophen (TYLENOL) 325 mg tablet Take 2 Tablets by mouth every six (6) hours as needed for Fever. 20 Tablet 0    cetirizine (ZyrTEC) 10 mg tablet Take 1 Tablet by mouth daily. 20 Tablet 0    gabapentin (NEURONTIN) 600 mg tablet TAKE 1 CAPSULE FOR 2 DAYS, THEN 2 CAPS AT BEDTIME FOR 2 DAYS, THEN 1 IN MORNING AND 2 IN EVENING (Patient not taking: Reported on 10/4/2022)      furosemide (LASIX) 40 mg tablet Take 1 Tab by mouth daily.  (Patient not taking: Reported on 10/4/2022) 30 Tab 1 Past History     Past Medical History:  Past Medical History:   Diagnosis Date    Sleep apnea 2018       Past Surgical History:  History reviewed. No pertinent surgical history. Family History:  Family History   Problem Relation Age of Onset    Diabetes Mother        Social History:  Social History     Tobacco Use    Smoking status: Former     Packs/day: 0.50     Years: 5.00     Pack years: 2.50     Types: Cigarettes    Smokeless tobacco: Never   Substance Use Topics    Alcohol use: No    Drug use: Yes     Frequency: 5.0 times per week     Types: Marijuana       Allergies:  No Known Allergies  Review of Systems   Review of Systems   Constitutional:  Positive for fatigue and fever. HENT:  Positive for sore throat. Eyes:  Negative for pain. Respiratory:  Negative for shortness of breath. Cardiovascular:  Negative for chest pain. Gastrointestinal:  Negative for abdominal pain. Genitourinary:  Negative for flank pain. Musculoskeletal:  Positive for myalgias. Negative for back pain. Skin:  Negative for rash. Neurological:  Negative for headaches. Physical Exam   Physical Exam  Vitals and nursing note reviewed. Constitutional:       General: He is not in acute distress. Appearance: He is well-developed. He is obese. He is not toxic-appearing. HENT:      Head: Normocephalic and atraumatic. No right periorbital erythema or left periorbital erythema. Right Ear: External ear normal.      Left Ear: External ear normal.      Nose: Nose normal.      Mouth/Throat:      Mouth: Mucous membranes are moist.   Eyes:      General: No scleral icterus. Conjunctiva/sclera: Conjunctivae normal.      Pupils: Pupils are equal, round, and reactive to light. Neck:      Comments:   Supple without meningismus  Cardiovascular:      Rate and Rhythm: Normal rate. Pulmonary:      Effort: Pulmonary effort is normal. No respiratory distress. Abdominal:      Palpations: Abdomen is soft. Tenderness: There is no abdominal tenderness. Musculoskeletal:         General: Normal range of motion. Cervical back: Normal range of motion. Right lower leg: Edema present. Left lower leg: Edema present. Comments:   Symmetric bilateral lower extremity edema without erythema or calf tenderness   Skin:     Findings: No rash. Neurological:      Mental Status: He is alert and oriented to person, place, and time. He is not disoriented. Cranial Nerves: No cranial nerve deficit. Sensory: No sensory deficit. Psychiatric:         Speech: Speech normal.     Diagnostic Study Results     Labs -     Recent Results (from the past 12 hour(s))   COVID-19 WITH INFLUENZA A/B    Collection Time: 10/04/22  2:00 PM   Result Value Ref Range    SARS-CoV-2 by PCR Not detected NOTD      Influenza A by PCR Not detected      Influenza B by PCR Not detected     STREP AG SCREEN, GROUP A    Collection Time: 10/04/22  3:26 PM    Specimen: Swab   Result Value Ref Range    Group A Strep Ag ID Negative NEG     CBC WITH AUTOMATED DIFF    Collection Time: 10/04/22  3:26 PM   Result Value Ref Range    WBC 11.8 (H) 4.1 - 11.1 K/uL    RBC 4.91 4.10 - 5.70 M/uL    HGB 14.2 12.1 - 17.0 g/dL    HCT 45.1 36.6 - 50.3 %    MCV 91.9 80.0 - 99.0 FL    MCH 28.9 26.0 - 34.0 PG    MCHC 31.5 30.0 - 36.5 g/dL    RDW 13.9 11.5 - 14.5 %    PLATELET 948 616 - 105 K/uL    MPV 10.0 8.9 - 12.9 FL    NRBC 0.0 0  WBC    ABSOLUTE NRBC 0.00 0.00 - 0.01 K/uL    NEUTROPHILS 83 (H) 32 - 75 %    BAND NEUTROPHILS 5 0 - 6 %    LYMPHOCYTES 5 (L) 12 - 49 %    MONOCYTES 6 5 - 13 %    EOSINOPHILS 1 0 - 7 %    BASOPHILS 0 0 - 1 %    IMMATURE GRANULOCYTES 0 %    ABS. NEUTROPHILS 10.4 (H) 1.8 - 8.0 K/UL    ABS. LYMPHOCYTES 0.6 (L) 0.8 - 3.5 K/UL    ABS. MONOCYTES 0.7 0.0 - 1.0 K/UL    ABS. EOSINOPHILS 0.1 0.0 - 0.4 K/UL    ABS. BASOPHILS 0.0 0.0 - 0.1 K/UL    ABS. IMM.  GRANS. 0.0 K/UL    DF MANUAL      RBC COMMENTS NORMOCYTIC, NORMOCHROMIC METABOLIC PANEL, BASIC    Collection Time: 10/04/22  3:26 PM   Result Value Ref Range    Sodium 134 (L) 136 - 145 mmol/L    Potassium 4.3 3.5 - 5.1 mmol/L    Chloride 97 97 - 108 mmol/L    CO2 29 21 - 32 mmol/L    Anion gap 8 5 - 15 mmol/L    Glucose 100 65 - 100 mg/dL    BUN 11 6 - 20 MG/DL    Creatinine 1.32 (H) 0.70 - 1.30 MG/DL    BUN/Creatinine ratio 8 (L) 12 - 20      eGFR >60 >60 ml/min/1.73m2    Calcium 8.5 8.5 - 10.1 MG/DL   LACTIC ACID    Collection Time: 10/04/22  3:26 PM   Result Value Ref Range    Lactic acid 1.1 0.4 - 2.0 MMOL/L       Radiologic Studies -   XR CHEST PORT   Final Result   No acute process. CT Results  (Last 48 hours)      None          CXR Results  (Last 48 hours)                 10/04/22 1358  XR CHEST PORT Final result    Impression:  No acute process. Narrative: Indication: Cough, fever       Comparison: 8/23/2021       Portable exam of the chest obtained at 1358 demonstrates heart size at the upper   limits of normal. There is no acute process in the lung fields. The osseous   structures are unremarkable. Medical Decision Making   I am the first provider for this patient. I reviewed the vital signs, available nursing notes, past medical history, past surgical history, family history and social history. Vital Signs-Reviewed the patient's vital signs.   Patient Vitals for the past 12 hrs:   Temp Pulse Resp BP SpO2   10/04/22 1627 -- (!) 108 20 (!) 145/94 95 %   10/04/22 1449 99.3 °F (37.4 °C) (!) 116 -- -- 92 %   10/04/22 1351 -- -- -- -- 95 %   10/04/22 1345 (!) 101.5 °F (38.6 °C) (!) 114 20 (!) 138/90 --   10/04/22 1320 98.9 °F (37.2 °C) (!) 109 19 (!) 166/120 94 %       Pulse Oximetry Analysis - 95% on RA    Records Reviewed: Nursing Notes, Old Medical Records, Previous Radiology Studies, and Previous Laboratory Studies    Provider Notes (Medical Decision Making):   DDx: [id number], influenza, pneumonia, viral illness, morbid obesity, JAVEIR, others    Fever was not captured at the time of triage. Patient presents with fever, body aches and fatigue. He does complain of very mild sore throat. He denies any cough or shortness of breath. We will give Tylenol for the fever. We will obtain COVID and influenza swabs. He is drinking water and there has been no nausea or vomiting. In spite of his fever, he does not appear acutely ill. We will continue to monitor. ED Course:   Initial assessment performed. The patients presenting problems have been discussed, and they are in agreement with the care plan formulated and outlined with them. I have encouraged them to ask questions as they arise throughout their visit. ED Course as of 10/04/22 1708   Tue Oct 04, 2022   1513 Fever has improved with Tylenol, however tachycardia persists. Patient denies any chest pain or shortness of breath. He does not appear acutely ill. Case discussed with Dr. Macey Montes. Testing for COVID and flu are negative. We will perform a rapid strep. Given his elevated heart rate, will obtain IV access, obtain basic labs to include lactic acid and offer IV fluids. We will continue to monitor. [EJ]   1646 Rapid strep is negative. Heart rate has improved somewhat. Lactate is not elevated. Renal function is preserved with slightly elevated creatinine similar to previous test results without evidence of JAVIER. There is a mild elevation of his WBCs without a left shift. Patient tells me his appetite is returning and he feels \"much better\". I do believe he has a viral illness. I anticipate discharge with medication for symptoms. Return precautions for any concerns. [EJ]      ED Course User Index  [EJ] MARLENE Valerio       Disposition:  Discharge    PLAN:  1.    Discharge Medication List as of 10/4/2022  4:51 PM        START taking these medications    Details   acetaminophen (TYLENOL) 325 mg tablet Take 2 Tablets by mouth every six (6) hours as needed for Fever., Normal, Disp-20 Tablet, R-0      cetirizine (ZyrTEC) 10 mg tablet Take 1 Tablet by mouth daily. , Normal, Disp-20 Tablet, R-0           CONTINUE these medications which have NOT CHANGED    Details   gabapentin (NEURONTIN) 600 mg tablet TAKE 1 CAPSULE FOR 2 DAYS, THEN 2 CAPS AT BEDTIME FOR 2 DAYS, THEN 1 IN MORNING AND 2 IN EVENING, Historical Med      furosemide (LASIX) 40 mg tablet Take 1 Tab by mouth daily. , Normal, Disp-30 Tab, R-1           2. Follow-up Information       Follow up With Specialties Details Why Contact Info    Jose J العلي MD  Follow up on 10/21/2022 PCP- on 10/21/22 at 10 AM, Wear a Mask, Arrive 15 mins early, Bring discharge paperworks and insurance cards [facility]  [address] [place], [address] [place] [address]  [phone]          Return to ED if worse     Diagnosis     Clinical Impression:   1. Acute febrile illness    2. Viral syndrome    3. Elevated serum creatinine    4.  Morbid obesity (HCC)    5. Elevated blood pressure reading

## 2022-10-04 NOTE — ED TRIAGE NOTES
Patient arrives to ED for c/o fatigue, sore throat, and decreased appetite x2 days. Patient denies chest pain, shortness of breath, nausea, or vomiting.

## 2022-10-06 LAB
BACTERIA SPEC CULT: NORMAL
SERVICE CMNT-IMP: NORMAL

## 2022-10-12 ENCOUNTER — APPOINTMENT (OUTPATIENT)
Dept: CT IMAGING | Age: 47
DRG: 951 | End: 2022-10-12
Attending: EMERGENCY MEDICINE
Payer: MEDICAID

## 2022-10-12 ENCOUNTER — APPOINTMENT (OUTPATIENT)
Dept: GENERAL RADIOLOGY | Age: 47
DRG: 951 | End: 2022-10-12
Attending: EMERGENCY MEDICINE
Payer: MEDICAID

## 2022-10-12 ENCOUNTER — HOSPITAL ENCOUNTER (INPATIENT)
Age: 47
LOS: 8 days | Discharge: HOME HEALTH | DRG: 951 | End: 2022-10-21
Attending: EMERGENCY MEDICINE | Admitting: FAMILY MEDICINE
Payer: MEDICAID

## 2022-10-12 DIAGNOSIS — L02.416 ABSCESS OF LEFT LEG: ICD-10-CM

## 2022-10-12 DIAGNOSIS — R09.02 HYPOXIA: Primary | ICD-10-CM

## 2022-10-12 LAB
ALBUMIN SERPL-MCNC: 3.4 G/DL (ref 3.5–5)
ALBUMIN/GLOB SERPL: 0.8 {RATIO} (ref 1.1–2.2)
ALP SERPL-CCNC: 65 U/L (ref 45–117)
ALT SERPL-CCNC: 30 U/L (ref 12–78)
ANION GAP SERPL CALC-SCNC: 3 MMOL/L (ref 5–15)
AST SERPL-CCNC: 9 U/L (ref 15–37)
BASOPHILS # BLD: 0 K/UL (ref 0–0.1)
BASOPHILS NFR BLD: 0 % (ref 0–1)
BILIRUB SERPL-MCNC: 0.2 MG/DL (ref 0.2–1)
BNP SERPL-MCNC: 28 PG/ML
BUN SERPL-MCNC: 13 MG/DL (ref 6–20)
BUN/CREAT SERPL: 11 (ref 12–20)
CALCIUM SERPL-MCNC: 8.8 MG/DL (ref 8.5–10.1)
CHLORIDE SERPL-SCNC: 105 MMOL/L (ref 97–108)
CO2 SERPL-SCNC: 29 MMOL/L (ref 21–32)
COMMENT, HOLDF: NORMAL
CREAT SERPL-MCNC: 1.22 MG/DL (ref 0.7–1.3)
DIFFERENTIAL METHOD BLD: ABNORMAL
EOSINOPHIL # BLD: 0.1 K/UL (ref 0–0.4)
EOSINOPHIL NFR BLD: 1 % (ref 0–7)
ERYTHROCYTE [DISTWIDTH] IN BLOOD BY AUTOMATED COUNT: 14.4 % (ref 11.5–14.5)
GLOBULIN SER CALC-MCNC: 4.3 G/DL (ref 2–4)
GLUCOSE SERPL-MCNC: 100 MG/DL (ref 65–100)
HCT VFR BLD AUTO: 39.8 % (ref 36.6–50.3)
HGB BLD-MCNC: 12.5 G/DL (ref 12.1–17)
IMM GRANULOCYTES # BLD AUTO: 0.2 K/UL (ref 0–0.04)
IMM GRANULOCYTES NFR BLD AUTO: 2 % (ref 0–0.5)
LYMPHOCYTES # BLD: 1.3 K/UL (ref 0.8–3.5)
LYMPHOCYTES NFR BLD: 13 % (ref 12–49)
MCH RBC QN AUTO: 29.3 PG (ref 26–34)
MCHC RBC AUTO-ENTMCNC: 31.4 G/DL (ref 30–36.5)
MCV RBC AUTO: 93.2 FL (ref 80–99)
MONOCYTES # BLD: 0.8 K/UL (ref 0–1)
MONOCYTES NFR BLD: 8 % (ref 5–13)
NEUTS SEG # BLD: 7.6 K/UL (ref 1.8–8)
NEUTS SEG NFR BLD: 76 % (ref 32–75)
NRBC # BLD: 0 K/UL (ref 0–0.01)
NRBC BLD-RTO: 0 PER 100 WBC
PLATELET # BLD AUTO: 266 K/UL (ref 150–400)
PMV BLD AUTO: 9.5 FL (ref 8.9–12.9)
POTASSIUM SERPL-SCNC: 4.2 MMOL/L (ref 3.5–5.1)
PROT SERPL-MCNC: 7.7 G/DL (ref 6.4–8.2)
RBC # BLD AUTO: 4.27 M/UL (ref 4.1–5.7)
SAMPLES BEING HELD,HOLD: NORMAL
SODIUM SERPL-SCNC: 137 MMOL/L (ref 136–145)
TROPONIN-HIGH SENSITIVITY: 6 NG/L (ref 0–76)
WBC # BLD AUTO: 10 K/UL (ref 4.1–11.1)

## 2022-10-12 PROCEDURE — 74011000636 HC RX REV CODE- 636: Performed by: RADIOLOGY

## 2022-10-12 PROCEDURE — 36415 COLL VENOUS BLD VENIPUNCTURE: CPT

## 2022-10-12 PROCEDURE — 99285 EMERGENCY DEPT VISIT HI MDM: CPT

## 2022-10-12 PROCEDURE — 80053 COMPREHEN METABOLIC PANEL: CPT

## 2022-10-12 PROCEDURE — 85025 COMPLETE CBC W/AUTO DIFF WBC: CPT

## 2022-10-12 PROCEDURE — 83880 ASSAY OF NATRIURETIC PEPTIDE: CPT

## 2022-10-12 PROCEDURE — 71046 X-RAY EXAM CHEST 2 VIEWS: CPT

## 2022-10-12 PROCEDURE — 93005 ELECTROCARDIOGRAM TRACING: CPT

## 2022-10-12 PROCEDURE — 71275 CT ANGIOGRAPHY CHEST: CPT

## 2022-10-12 PROCEDURE — 84484 ASSAY OF TROPONIN QUANT: CPT

## 2022-10-12 RX ADMIN — IOPAMIDOL 85 ML: 755 INJECTION, SOLUTION INTRAVENOUS at 23:30

## 2022-10-12 RX ADMIN — IOPAMIDOL 100 ML: 755 INJECTION, SOLUTION INTRAVENOUS at 23:55

## 2022-10-13 ENCOUNTER — APPOINTMENT (OUTPATIENT)
Dept: CT IMAGING | Age: 47
DRG: 951 | End: 2022-10-13
Attending: HOSPITALIST
Payer: MEDICAID

## 2022-10-13 ENCOUNTER — APPOINTMENT (OUTPATIENT)
Dept: VASCULAR SURGERY | Age: 47
DRG: 951 | End: 2022-10-13
Attending: EMERGENCY MEDICINE
Payer: MEDICAID

## 2022-10-13 PROBLEM — J96.02 ACUTE RESPIRATORY FAILURE WITH HYPOXIA AND HYPERCAPNIA (HCC): Status: ACTIVE | Noted: 2022-10-13

## 2022-10-13 PROBLEM — J96.01 ACUTE RESPIRATORY FAILURE WITH HYPOXIA AND HYPERCAPNIA (HCC): Status: ACTIVE | Noted: 2022-10-13

## 2022-10-13 LAB
ALBUMIN SERPL-MCNC: 3.4 G/DL (ref 3.5–5)
ALBUMIN/GLOB SERPL: 0.9 {RATIO} (ref 1.1–2.2)
ALP SERPL-CCNC: 66 U/L (ref 45–117)
ALT SERPL-CCNC: 26 U/L (ref 12–78)
ANION GAP SERPL CALC-SCNC: 3 MMOL/L (ref 5–15)
APTT PPP: 34.8 SEC (ref 22.1–31)
ARTERIAL PATENCY WRIST A: POSITIVE
AST SERPL-CCNC: 9 U/L (ref 15–37)
ATRIAL RATE: 102 BPM
BASE EXCESS BLD CALC-SCNC: 2.8 MMOL/L
BASE EXCESS BLDV CALC-SCNC: 0.8 MMOL/L
BASOPHILS # BLD: 0 K/UL (ref 0–0.1)
BASOPHILS NFR BLD: 0 % (ref 0–1)
BDY SITE: ABNORMAL
BILIRUB SERPL-MCNC: 0.5 MG/DL (ref 0.2–1)
BUN SERPL-MCNC: 10 MG/DL (ref 6–20)
BUN/CREAT SERPL: 10 (ref 12–20)
CALCIUM SERPL-MCNC: 9.1 MG/DL (ref 8.5–10.1)
CALCULATED P AXIS, ECG09: 51 DEGREES
CALCULATED R AXIS, ECG10: -54 DEGREES
CALCULATED T AXIS, ECG11: 39 DEGREES
CHLORIDE SERPL-SCNC: 102 MMOL/L (ref 97–108)
CK SERPL-CCNC: 157 U/L (ref 39–308)
CO2 SERPL-SCNC: 32 MMOL/L (ref 21–32)
CREAT SERPL-MCNC: 1.01 MG/DL (ref 0.7–1.3)
DIAGNOSIS, 93000: NORMAL
DIFFERENTIAL METHOD BLD: ABNORMAL
EOSINOPHIL # BLD: 0.1 K/UL (ref 0–0.4)
EOSINOPHIL NFR BLD: 1 % (ref 0–7)
ERYTHROCYTE [DISTWIDTH] IN BLOOD BY AUTOMATED COUNT: 14.4 % (ref 11.5–14.5)
EST. AVERAGE GLUCOSE BLD GHB EST-MCNC: 134 MG/DL
GAS FLOW.O2 O2 DELIVERY SYS: ABNORMAL L/MIN
GLOBULIN SER CALC-MCNC: 3.7 G/DL (ref 2–4)
GLUCOSE SERPL-MCNC: 95 MG/DL (ref 65–100)
HBA1C MFR BLD: 6.3 % (ref 4–5.6)
HCO3 BLD-SCNC: 30.3 MMOL/L (ref 22–26)
HCO3 BLDV-SCNC: 28.2 MMOL/L (ref 23–28)
HCT VFR BLD AUTO: 41.9 % (ref 36.6–50.3)
HGB BLD-MCNC: 12.7 G/DL (ref 12.1–17)
IMM GRANULOCYTES # BLD AUTO: 0.1 K/UL (ref 0–0.04)
IMM GRANULOCYTES NFR BLD AUTO: 1 % (ref 0–0.5)
INR PPP: 1 (ref 0.9–1.1)
LACTATE SERPL-SCNC: 0.6 MMOL/L (ref 0.4–2)
LYMPHOCYTES # BLD: 1 K/UL (ref 0.8–3.5)
LYMPHOCYTES NFR BLD: 9 % (ref 12–49)
MAGNESIUM SERPL-MCNC: 2.1 MG/DL (ref 1.6–2.4)
MCH RBC QN AUTO: 29 PG (ref 26–34)
MCHC RBC AUTO-ENTMCNC: 30.3 G/DL (ref 30–36.5)
MCV RBC AUTO: 95.7 FL (ref 80–99)
MONOCYTES # BLD: 0.8 K/UL (ref 0–1)
MONOCYTES NFR BLD: 7 % (ref 5–13)
NEUTS SEG # BLD: 9 K/UL (ref 1.8–8)
NEUTS SEG NFR BLD: 82 % (ref 32–75)
NRBC # BLD: 0 K/UL (ref 0–0.01)
NRBC BLD-RTO: 0 PER 100 WBC
O2/TOTAL GAS SETTING VFR VENT: 40 %
P-R INTERVAL, ECG05: 132 MS
PCO2 BLD: 57.7 MMHG (ref 35–45)
PCO2 BLDV: 55.4 MMHG (ref 41–51)
PEEP RESPIRATORY: 6 CMH2O
PH BLD: 7.33 [PH] (ref 7.35–7.45)
PH BLDV: 7.32 [PH] (ref 7.32–7.42)
PIP ISTAT,IPIP: 10
PLATELET # BLD AUTO: 263 K/UL (ref 150–400)
PMV BLD AUTO: 9.9 FL (ref 8.9–12.9)
PO2 BLD: 148 MMHG (ref 80–100)
PO2 BLDV: 66 MMHG (ref 25–40)
POTASSIUM SERPL-SCNC: 4.3 MMOL/L (ref 3.5–5.1)
PRESSURE SUPPORT SETTING VENT: 4 CMH2O
PROT SERPL-MCNC: 7.1 G/DL (ref 6.4–8.2)
PROTHROMBIN TIME: 10.7 SEC (ref 9–11.1)
Q-T INTERVAL, ECG07: 344 MS
QRS DURATION, ECG06: 98 MS
QTC CALCULATION (BEZET), ECG08: 448 MS
RBC # BLD AUTO: 4.38 M/UL (ref 4.1–5.7)
SAO2 % BLD: 99.1 % (ref 92–97)
SAO2 % BLDV: 90.2 % (ref 65–88)
SERVICE CMNT-IMP: ABNORMAL
SODIUM SERPL-SCNC: 137 MMOL/L (ref 136–145)
SPECIMEN TYPE: ABNORMAL
SPECIMEN TYPE: ABNORMAL
THERAPEUTIC RANGE,PTTT: ABNORMAL SECS (ref 58–77)
VENTILATION MODE VENT: ABNORMAL
VENTRICULAR RATE, ECG03: 102 BPM
WBC # BLD AUTO: 11 K/UL (ref 4.1–11.1)

## 2022-10-13 PROCEDURE — 83036 HEMOGLOBIN GLYCOSYLATED A1C: CPT

## 2022-10-13 PROCEDURE — 85730 THROMBOPLASTIN TIME PARTIAL: CPT

## 2022-10-13 PROCEDURE — 93970 EXTREMITY STUDY: CPT

## 2022-10-13 PROCEDURE — 74011250636 HC RX REV CODE- 250/636: Performed by: HOSPITALIST

## 2022-10-13 PROCEDURE — 83605 ASSAY OF LACTIC ACID: CPT

## 2022-10-13 PROCEDURE — 80053 COMPREHEN METABOLIC PANEL: CPT

## 2022-10-13 PROCEDURE — 74011250636 HC RX REV CODE- 250/636: Performed by: FAMILY MEDICINE

## 2022-10-13 PROCEDURE — 73701 CT LOWER EXTREMITY W/DYE: CPT

## 2022-10-13 PROCEDURE — 74011000636 HC RX REV CODE- 636

## 2022-10-13 PROCEDURE — 5A09357 ASSISTANCE WITH RESPIRATORY VENTILATION, LESS THAN 24 CONSECUTIVE HOURS, CONTINUOUS POSITIVE AIRWAY PRESSURE: ICD-10-PCS | Performed by: FAMILY MEDICINE

## 2022-10-13 PROCEDURE — 87040 BLOOD CULTURE FOR BACTERIA: CPT

## 2022-10-13 PROCEDURE — 85610 PROTHROMBIN TIME: CPT

## 2022-10-13 PROCEDURE — 83735 ASSAY OF MAGNESIUM: CPT

## 2022-10-13 PROCEDURE — 82803 BLOOD GASES ANY COMBINATION: CPT

## 2022-10-13 PROCEDURE — 65660000001 HC RM ICU INTERMED STEPDOWN

## 2022-10-13 PROCEDURE — 85025 COMPLETE CBC W/AUTO DIFF WBC: CPT

## 2022-10-13 PROCEDURE — 36600 WITHDRAWAL OF ARTERIAL BLOOD: CPT

## 2022-10-13 PROCEDURE — 36415 COLL VENOUS BLD VENIPUNCTURE: CPT

## 2022-10-13 PROCEDURE — 74011000250 HC RX REV CODE- 250: Performed by: FAMILY MEDICINE

## 2022-10-13 PROCEDURE — 74011250637 HC RX REV CODE- 250/637: Performed by: FAMILY MEDICINE

## 2022-10-13 PROCEDURE — 74011000258 HC RX REV CODE- 258: Performed by: HOSPITALIST

## 2022-10-13 PROCEDURE — 74011250637 HC RX REV CODE- 250/637: Performed by: HOSPITALIST

## 2022-10-13 PROCEDURE — 82550 ASSAY OF CK (CPK): CPT

## 2022-10-13 RX ORDER — HYDRALAZINE HYDROCHLORIDE 20 MG/ML
10 INJECTION INTRAMUSCULAR; INTRAVENOUS
Status: DISCONTINUED | OUTPATIENT
Start: 2022-10-13 | End: 2022-10-21 | Stop reason: HOSPADM

## 2022-10-13 RX ORDER — FENTANYL CITRATE 50 UG/ML
50 INJECTION, SOLUTION INTRAMUSCULAR; INTRAVENOUS
Status: DISCONTINUED | OUTPATIENT
Start: 2022-10-13 | End: 2022-10-13 | Stop reason: SDUPTHER

## 2022-10-13 RX ORDER — SODIUM CHLORIDE 0.9 % (FLUSH) 0.9 %
5-40 SYRINGE (ML) INJECTION AS NEEDED
Status: DISCONTINUED | OUTPATIENT
Start: 2022-10-13 | End: 2022-10-21 | Stop reason: HOSPADM

## 2022-10-13 RX ORDER — OXYCODONE AND ACETAMINOPHEN 10; 325 MG/1; MG/1
1 TABLET ORAL
Status: DISCONTINUED | OUTPATIENT
Start: 2022-10-13 | End: 2022-10-21 | Stop reason: HOSPADM

## 2022-10-13 RX ORDER — MORPHINE SULFATE 2 MG/ML
2 INJECTION, SOLUTION INTRAMUSCULAR; INTRAVENOUS
Status: DISCONTINUED | OUTPATIENT
Start: 2022-10-13 | End: 2022-10-14

## 2022-10-13 RX ORDER — ENOXAPARIN SODIUM 100 MG/ML
30 INJECTION SUBCUTANEOUS EVERY 12 HOURS
Status: DISCONTINUED | OUTPATIENT
Start: 2022-10-13 | End: 2022-10-20

## 2022-10-13 RX ORDER — POLYETHYLENE GLYCOL 3350 17 G/17G
17 POWDER, FOR SOLUTION ORAL DAILY PRN
Status: DISCONTINUED | OUTPATIENT
Start: 2022-10-13 | End: 2022-10-16

## 2022-10-13 RX ORDER — ACETAMINOPHEN 650 MG/1
650 SUPPOSITORY RECTAL
Status: DISCONTINUED | OUTPATIENT
Start: 2022-10-13 | End: 2022-10-21 | Stop reason: HOSPADM

## 2022-10-13 RX ORDER — CARVEDILOL 12.5 MG/1
12.5 TABLET ORAL 2 TIMES DAILY WITH MEALS
Status: DISCONTINUED | OUTPATIENT
Start: 2022-10-13 | End: 2022-10-14

## 2022-10-13 RX ORDER — ACETAMINOPHEN 325 MG/1
650 TABLET ORAL
Status: DISCONTINUED | OUTPATIENT
Start: 2022-10-13 | End: 2022-10-21 | Stop reason: HOSPADM

## 2022-10-13 RX ORDER — ONDANSETRON 2 MG/ML
4 INJECTION INTRAMUSCULAR; INTRAVENOUS
Status: DISCONTINUED | OUTPATIENT
Start: 2022-10-13 | End: 2022-10-21 | Stop reason: HOSPADM

## 2022-10-13 RX ORDER — ONDANSETRON 4 MG/1
4 TABLET, ORALLY DISINTEGRATING ORAL
Status: DISCONTINUED | OUTPATIENT
Start: 2022-10-13 | End: 2022-10-21 | Stop reason: HOSPADM

## 2022-10-13 RX ORDER — SODIUM CHLORIDE 0.9 % (FLUSH) 0.9 %
5-40 SYRINGE (ML) INJECTION EVERY 8 HOURS
Status: DISCONTINUED | OUTPATIENT
Start: 2022-10-13 | End: 2022-10-21 | Stop reason: HOSPADM

## 2022-10-13 RX ORDER — FUROSEMIDE 40 MG/1
40 TABLET ORAL DAILY
Status: DISCONTINUED | OUTPATIENT
Start: 2022-10-14 | End: 2022-10-14

## 2022-10-13 RX ADMIN — ENOXAPARIN SODIUM 30 MG: 100 INJECTION SUBCUTANEOUS at 09:16

## 2022-10-13 RX ADMIN — CARVEDILOL 12.5 MG: 12.5 TABLET, FILM COATED ORAL at 16:05

## 2022-10-13 RX ADMIN — VANCOMYCIN HYDROCHLORIDE 2500 MG: 10 INJECTION, POWDER, LYOPHILIZED, FOR SOLUTION INTRAVENOUS at 10:53

## 2022-10-13 RX ADMIN — SODIUM CHLORIDE, PRESERVATIVE FREE 10 ML: 5 INJECTION INTRAVENOUS at 13:41

## 2022-10-13 RX ADMIN — PIPERACILLIN AND TAZOBACTAM 4.5 G: 4; .5 INJECTION, POWDER, FOR SOLUTION INTRAVENOUS at 10:54

## 2022-10-13 RX ADMIN — VANCOMYCIN HYDROCHLORIDE 1250 MG: 1.25 INJECTION, POWDER, LYOPHILIZED, FOR SOLUTION INTRAVENOUS at 21:32

## 2022-10-13 RX ADMIN — PIPERACILLIN AND TAZOBACTAM 3.38 G: 3; .375 INJECTION, POWDER, FOR SOLUTION INTRAVENOUS at 16:04

## 2022-10-13 RX ADMIN — OXYCODONE AND ACETAMINOPHEN 1 TABLET: 10; 325 TABLET ORAL at 13:48

## 2022-10-13 RX ADMIN — SODIUM CHLORIDE, PRESERVATIVE FREE 10 ML: 5 INJECTION INTRAVENOUS at 06:00

## 2022-10-13 RX ADMIN — SODIUM CHLORIDE, PRESERVATIVE FREE 10 ML: 5 INJECTION INTRAVENOUS at 21:32

## 2022-10-13 RX ADMIN — OXYCODONE AND ACETAMINOPHEN 1 TABLET: 10; 325 TABLET ORAL at 22:12

## 2022-10-13 RX ADMIN — IOPAMIDOL 100 ML: 612 INJECTION, SOLUTION INTRAVENOUS at 10:18

## 2022-10-13 RX ADMIN — OXYCODONE AND ACETAMINOPHEN 1 TABLET: 10; 325 TABLET ORAL at 18:01

## 2022-10-13 RX ADMIN — ENOXAPARIN SODIUM 30 MG: 100 INJECTION SUBCUTANEOUS at 21:31

## 2022-10-13 RX ADMIN — ACETAMINOPHEN 650 MG: 325 TABLET ORAL at 09:16

## 2022-10-13 NOTE — ED NOTES
Pts 02 sats dropping in upper 70's while sleeping. Pt awakened to deep breathe. NC increased to 5L.   Pt noted to have narcolepsy- as he is falling asleep while speaking to this RN

## 2022-10-13 NOTE — H&P
[address] [facility] Adult  Hospitalist Group  History and Physical    Date of Service:  10/13/2022  Primary Care Provider: None  Source of information: Chart review    Chief Complaint: patient does not provide      History of Presenting Illness: Rosana Polanco is a 56 y.o. male  with past medical history of obstructive sleep apnea, class III obesity, lymphedema presented to the emergency department via private transport from home with reported initial chief complaint of shortness of breath and leg swelling. At current the patient is obtunded and not providing history. As such majority of history was obtained per review of ED and electronic medical records. Per ER triage note, patient arrived awake alert oriented x4. He reportedly had chronic leg edema and swelling over the past year. Leg swelling and shortness of breath notably worsened over the past prior 3 days. He initially went to [facility] urgent care center a few days ago and has been given antibiotics for unknown diagnosis. On arrival emergency department tonight, initial recorded vital signs temperature 97.7 °F, /95, heart rate 108, respirate 18, O2 saturation 95% on room air. Per chart records patient was placed on 2 L, followed by 5 L of O2 via nasal cannula and then a hundredths and nonrebreather. Venous blood gas showed pH 7.32, PCO2 55.4, PO2 66, HCO3 28.2, base excess 0.8, SaO2 90.2%. CTA chest with IV contrast was negative for PE and showed no acute process. 12 EKG shows sinus tachycardia, right bundle branch block, left anterior fascicular block 102 bpm.  ED requested admission to the hospitalist service. However patient was requiring a hundredths and nonrebreather facemask and high risk of deterioration, I request the ED consult with ICU intensivist.  On arrival at the patient's bedside, patient was on CPAP with O2 saturation 99%. As such, orders were placed for admission to the hospitalist service.          REVIEW OF SYSTEMS:  Unable to obtain review of systems as patient is not answering questions appropriately    Past Medical History:   Diagnosis Date    Sleep apnea 2018       Medications:  Prior to Admission medications    Medication Sig Start Date End Date Taking? Authorizing Provider   acetaminophen (TYLENOL) 325 mg tablet Take 2 Tablets by mouth every six (6) hours as needed for Fever. 10/4/22   Collette Current, PA   cetirizine (ZyrTEC) 10 mg tablet Take 1 Tablet by mouth daily. 10/4/22   Collette Current, PA   gabapentin (NEURONTIN) 600 mg tablet TAKE 1 CAPSULE FOR 2 DAYS, THEN 2 CAPS AT BEDTIME FOR 2 DAYS, THEN 1 IN MORNING AND 2 IN EVENING  Patient not taking: Reported on 10/4/2022 8/3/20   Provider, Low   furosemide (LASIX) 40 mg tablet Take 1 Tab by mouth daily. Patient not taking: Reported on 10/4/2022 12/20/19   Do Gomes MD     Allergies:  No known drug allergies    Family history:  Family History   Problem Relation Age of Onset    Diabetes Mother       Social History:  reports that he has quit smoking. His smoking use included cigarettes. He has a 2.50 pack-year smoking history. He has never used smokeless tobacco. He reports current drug use. Frequency: 5.00 times per week. Drug: Marijuana. He reports that he does not drink alcohol. Objective:   Visit Vitals  BP (!)  185/83   Pulse 95   Temp 98 °F (36.7 °C)   Resp (!) 30   Ht 5' 7\" (1.702 m)   Wt 147 kg (324 lb)   SpO2 99%   BMI 50.75 kg/m²     On CPAP 10; FiO2 40%    PHYSICAL EXAM:   General:  Obese male in acute respiratory distress; tachypneic   Head:  Normocephalic, without obvious abnormality, atraumatic   Eyes:  Conjunctivae/corneas clear. Pupils 2 mm reactive bilateral.   E/N/M/T: Nares normal. Septum midline.  No nasal drainage or sinus tenderness  Tongue midline/ non-edematous  Clear oropharynx   Neck: Normal appearance and movements, symmetrical, trachea midline  No palpable adenopathy  No thyroid enlargement, tenderness or nodules  No carotid bruit   No JVD  Trachea midline   Lungs:   Symmetrical chest expansion and respiratory effort  Rhonchi to auscultation bilaterally   Chest wall:  No tenderness or deformity   Heart:  Regular rate and rhythm   Normal S1 and S2; no murmur, click, rub or gallop   Abdomen:   Soft, no tenderness  No rebound, guarding, or rigidity  Non-distended   Bowel sounds normal  No masses or hepatosplenomegaly  No hernias present   Back: No costovertebral angle tenderness  No step-off deformity   Extremities: Extremities normal, atraumatic  No cyanosis   Massive non-pitting lymphedema of BLE   Vascular/  Pulses: 2+ radial/ 1+ DP bilateral pulses   Integument/  Skin: No rashes or ulcers  Warm and dry   Musculo-      skeletal: Gait not tested  No calf tenderness   Neuro: GCS 13 (E4 V3 M6). Moves all extremities x 4 with generalized weakness. No slurred speech. No facial droop. Sensation grossly intact. Psych: Obtunded; arouses to loud verbal stimuli and orients x 1 to name              Data Review: All diagnostic labs and studies have been reviewed. Abnormal Labs Reviewed   CBC WITH AUTOMATED DIFF - Abnormal; Notable for the following components:       Result Value    NEUTROPHILS 76 (*)     IMMATURE GRANULOCYTES 2 (*)     ABS. IMM.  GRANS. 0.2 (*)     All other components within normal limits   METABOLIC PANEL, COMPREHENSIVE - Abnormal; Notable for the following components:    Anion gap 3 (*)     BUN/Creatinine ratio 11 (*)     AST (SGOT) 9 (*)     Albumin 3.4 (*)     Globulin 4.3 (*)     A-G Ratio 0.8 (*)     All other components within normal limits   POC VENOUS BLOOD GAS - Abnormal; Notable for the following components:    pCO2, venous (POC) 55.4 (*)     pO2, venous (POC) 66 (*)     HCO3, venous (POC) 28.2 (*)     sO2, venous (POC) 90.2 (*)     All other components within normal limits       All Micro Results       None            IMAGING:   CTA CHEST W OR W WO CONT   Final Result   There is no pulmonary embolism. There is no aortic aneurysm or dissection. No acute intrathoracic process is identified. Incidental findings are as   described above. XR CHEST PA LAT   Final Result      Normal PA and lateral chest views. DUPLEX LOWER EXT VENOUS BILAT    (Results Pending)        ECG/ECHO:    Results for orders placed or performed during the hospital encounter of 10/12/22   EKG, 12 LEAD, INITIAL   Result Value Ref Range    Ventricular Rate 102 BPM    Atrial Rate 102 BPM    P-R Interval 132 ms    QRS Duration 98 ms    Q-T Interval 344 ms    QTC Calculation (Bezet) 448 ms    Calculated P Axis 51 degrees    Calculated R Axis -54 degrees    Calculated T Axis 39 degrees    Diagnosis       Sinus tachycardia  Incomplete right bundle branch block  Left anterior fascicular block  Abnormal ECG  When compared with ECG of 23-AUG-2021 12:29,  No significant change was found          Assessment:   Given the patient's current clinical presentation, there is a high level of concern for decompensation if discharged from the emergency department. Complex decision making was performed, which includes reviewing the patient's available past medical records, laboratory results, and imaging studies. Active Problems:    1. Acute respiratory failure with hypoxia and hypercapnia  -admit to IMCU, albeit monitor closely with continuous pulse oximetry  -change to BIPAP; adjust settings accordingly  -Titrate O2 to keep O2 saturation greater than 92%  -Consult pulmonologist  -If patient is not improving on the same then will need to be transferred to ICU  -Repeat ABG    2. Altered mental status  -Patient has hypercapnia. As such change settings to BiPAP and correct respiratory acidosis  -place on neuro checks and fall precautions    3. Edema of bilateral lower extremities  -lymphedema  -keep legs elevated at rest  -Placed on strict I's and O's and daily weights to monitor fluid status closely    4.   Shortness of breath with exertion  -Plan as above    5. Obstructive sleep apnea  -Plan as noted    6. Tachycardia   -continue telemetry monitoring    7. Class 3 obesity  -BMI 49.26 kg/m2  -would urge eventual weight loss, heart healthy diet, lifestyle modification                 DIET: NPO  ISOLATION PRECAUTIONS: There are currently no Active Isolations  CODE STATUS:  FULL CODE  DVT PROPHYLAXIS: Lovenox  FUNCTIONAL STATUS PRIOR TO HOSPITALIZATION: Fully active and ambulatory; able to carry on all self-care without restriction. Ambulatory status/function: By self   EARLY MOBILITY ASSESSMENT: Recommend routine ambulation while hospitalized with the assistance of nursing staff  ANTICIPATED DISCHARGE: Greater than 48 hours. ANTICIPATED DISPOSITION: Home      CRITICAL CARE WAS PERFORMED FOR THIS ENCOUNTER: NO.      Signed By: Lidya Cordova MD     October 13, 2022         Please note that this dictation may have been completed with Dragon, the [employer] voice recognition software. Quite often unanticipated grammatical, syntax, homophones, and other interpretive errors are inadvertently transcribed by the computer software. Please disregard these errors. Please excuse any errors that have escaped final proofreading.

## 2022-10-13 NOTE — ROUTINE PROCESS
TRANSFER - OUT REPORT:    Verbal report given to Eugenia(name) on Onelia Cos  being transferred to [facility] (unit) for routine progression of care       Report consisted of patients Situation, Background, Assessment and   Recommendations(SBAR). Information from the following report(s) SBAR, Kardex, ED Summary, Intake/Output, MAR, Recent Results, Med Rec Status, and Cardiac Rhythm NSR  was reviewed with the receiving nurse. Lines:   Peripheral IV 10/12/22 Left Antecubital (Active)   Site Assessment Clean, dry, & intact 10/12/22 2024   Phlebitis Assessment 0 10/12/22 2024   Infiltration Assessment 0 10/12/22 2024   Dressing Status Clean, dry, & intact 10/12/22 2024   Dressing Type Transparent 10/12/22 2024   Hub Color/Line Status Pink;Patent; Flushed 10/12/22 2024   Action Taken Blood drawn 10/12/22 2024   Alcohol Cap Used No 10/12/22 2024        Opportunity for questions and clarification was provided.       Patient transported with:   Monitor  O2 @ 10 liters  Registered Nurse

## 2022-10-13 NOTE — PROGRESS NOTES
Settings changed to BiPap per MD orders.        10/13/22 0218   Oxygen Therapy   O2 Sat (%) 99 %   Pulse via Oximetry 92 beats per minute   O2 Device BIPAP   FIO2 (%) 40 %   CPAP/BIPAP   CPAP/BIPAP Start/Stop On   Device Mode BIPAP   Mask Type and Size Full face   PIP Observed 9.9 cm H20   IPAP (cm H2O) 10 cm H2O   EPAP (cm H2O) 6 cm H2O   Inspiratory Time (sec) 1 seconds   Vt Spont (ml) 433 ml   Ve Observed (l/min) 12.1 l/min   Backup Rate 4   Total RR (Spontaneous) 32 breaths per minute   Insp Rise Time (sec) 3   Leak (Estimated) 31.5 L/min   Pt's Home Machine No   Settings Verified Yes

## 2022-10-13 NOTE — PROGRESS NOTES
Pharmacist Note - Vancomycin Dosing    Consult provided for this 56 y.o. male for indication of lower extremity cellulitis. Antibiotic regimen(s): vancomycin + Zosyn   Patient on vancomycin PTA? NO     Recent Labs     10/13/22  0943 10/12/22  2025   WBC 11.0 10.0   CREA 1.01 1.22   BUN 10 13     Frequency of BMP: x 1 tomorrow   Height: 172.7 cm  Weight: 147 kg  Est CrCl: ? 100 ml/min  Temp (24hrs), Av °F (36.7 °C), Min:97.7 °F (36.5 °C), Max:98.5 °F (36.9 °C)    MRSA Swab ordered (if applicable)? N/A    The plan below is expected to result in a target range of AUC/RAFAEL 400-500    Therapy will be initiated with a loading dose of 2500 mg IV x 1 to be followed by a maintenance dose of 1250 mg IV every 12 hours. Pharmacy to follow patient daily and order levels / make dose adjustments as appropriate. *Vancomycin has been dosed used Bayesian kinetics software to target an AUC/RAFAEL of 400-600, which provides adequate exposure for an assumed infection due to MRSA with an RAFAEL of 1 or less while reducing the risk of nephrotoxicity as seen with traditional trough based dosing goals.

## 2022-10-13 NOTE — PROGRESS NOTES
[address] [facility] Adult  Hospitalist Group                                                                                          Hospitalist Progress Note  Michelle Quintero MD  Answering service: [phone] from in house phone        Date of Service:  10/13/2022  NAME:  Dawood Diamond  :  1975  MRN:  [id number]      Admission Summary:   Dawood Diamond is a 56 y.o. male  with past medical history of obstructive sleep apnea, class III obesity, lymphedema presented to the emergency department via private transport from home with reported initial chief complaint of shortness of breath and leg swelling. on admission the patient is obtunded and not providing history. As such majority of history was obtained per review of ED and electronic medical records. Per ER triage note, patient arrived awake alert oriented x4. He reportedly had chronic leg edema and swelling over the past year. Leg swelling and shortness of breath notably worsened over the past prior 3 days. He initially went to [facility] urgent care center a few days ago and has been given antibiotics for unknown diagnosis. On arrival emergency department tonight, initial recorded vital signs temperature 97.7 °F, /95, heart rate 108, respirate 18, O2 saturation 95% on room air. Per chart records patient was placed on 2 L, followed by 5 L of O2 via nasal cannula and then a hundredths and nonrebreather. Venous blood gas showed pH 7.32, PCO2 55.4, PO2 66, HCO3 28.2, base excess 0.8, SaO2 90.2%. CTA chest with IV contrast was negative for PE and showed no acute process. 12 EKG shows sinus tachycardia, right bundle branch block, left anterior fascicular block 102 bpm.  ED requested admission to the hospitalist service.   However patient was requiring a hundredths and nonrebreather facemask and high risk of deterioration, I request the ED consult with ICU intensivist.  On arrival at the patient's bedside, patient was on CPAP with O2 saturation 99%. As such, orders were placed for admission to the hospitalist service. Interval history / Subjective:   Severe left leg pain and swelling  Said : [facility] for this leg infection 2 days ago      Assessment & Plan:      Acute on chronic hypercarbic respiratory failure, respiratory acidosis is partially compensated  Untreated sleep apnea/obesity hypoventilation syndrome  Pulmonologist consulted  Continue Bipap HS and prn at nap time  Recommend that he return to the sleep clinic with Dr. Jacolyn Goldmann immediately after discharge to reestablish care  He will need compliance with recommended PAP therapy, tracheostomy is another option  Diuresis as long cr tolerate     Acute left leg cellulitis:  Stat leg CT no abscess, no acute compartment, check CPK   Continue iv vanco and zosyn for now, will de-escalate      Bilateral lower extremity edema/lymphedema   Prn diuresis   Keep legs elevated      Uncontrolled systemic hypertension    Acute metabolic encephalopathy/Altered mental status  -due to hypercapnia. As such change settings to BiPAP and correct respiratory acidosis  -resolved now       Tachycardia   -continue telemetry monitoring     Class 3 obesity  -BMI 49.26 kg/m2  -would urge eventual weight loss, heart healthy diet, lifestyle modification       Code status: full   Prophylaxis: sc lovenox   Care Plan discussed with: pt, cm, rn,   Anticipated Disposition: TBD, home >48 hours      Hospital Problems  Date Reviewed: 2/22/2021            Codes Class Noted POA    Acute respiratory failure with hypoxia and hypercapnia (HCC) ICD-10-CM: J96.01, J96.02  ICD-9-CM: 518.81  10/13/2022 Unknown             Review of Systems:   A comprehensive review of systems was negative except for that written in the HPI. Vital Signs:    Last 24hrs VS reviewed since prior progress note.  Most recent are:  Visit Vitals  BP (!) 179/79 (BP 1 Location: Right lower arm, BP Patient Position: At rest) Pulse (!) 105   Temp 98.1 °F (36.7 °C)   Resp 25   Ht 5' 8\" (1.727 m)   Wt 147 kg (324 lb)   SpO2 97%   BMI 49.26 kg/m²         Intake/Output Summary (Last 24 hours) at 10/13/2022 1339  Last data filed at 10/13/2022 0400  Gross per 24 hour   Intake --   Output 350 ml   Net -350 ml        Physical Examination:     I had a face to face encounter with this patient and independently examined them on 10/13/2022 as outlined below:          Constitutional:  No acute distress, cooperative, pleasant    ENT:  Oral mucosa moist, oropharynx benign. Resp:  CTA bilaterally. No wheezing/rhonchi/rales. No accessory muscle use. CV:  Regular rhythm, normal rate, no murmurs, gallops, rubs    GI:  Soft, non distended, non tender. normoactive bowel sounds, no hepatosplenomegaly     Musculoskeletal:  Left leg swelling, noticed a nodular induration at lower leg. Tender to touch. Pulses palpable     Neurologic:  Moves all extremities. AAOx3, CN II-XII reviewed            Data Review:    Review and/or order of clinical lab test      Labs:     Recent Labs     10/13/22  0943 10/12/22  2025   WBC 11.0 10.0   HGB 12.7 12.5   HCT 41.9 39.8    266     Recent Labs     10/13/22  0943 10/12/22  2025    137   K 4.3 4.2    105   CO2 32 29   BUN 10 13   CREA 1.01 1.22   GLU 95 100   CA 9.1 8.8   MG 2.1  --      Recent Labs     10/13/22  0943 10/12/22  2025   ALT 26 30   AP 66 65   TBILI 0.5 0.2   TP 7.1 7.7   ALB 3.4* 3.4*   GLOB 3.7 4.3*     Recent Labs     10/13/22  0943   INR 1.0   PTP 10.7   APTT 34.8*      No results for input(s): FE, TIBC, PSAT, FERR in the last 72 hours. No results found for: FOL, RBCF   No results for input(s): PH, PCO2, PO2 in the last 72 hours.   Recent Labs     10/13/22  0943        Lab Results   Component Value Date/Time    Cholesterol, total 147 05/07/2013 12:49 PM    HDL Cholesterol 37 (L) 05/07/2013 12:49 PM    LDL, calculated 98 05/07/2013 12:49 PM    Triglyceride 62 05/07/2013 12:49 PM     No results found for: [facility]  Lab Results   Component Value Date/Time    Color YELLOW/STRAW 07/01/2018 11:04 PM    Appearance CLEAR 07/01/2018 11:04 PM    Specific gravity 1.026 07/01/2018 11:04 PM    pH (UA) 5.5 07/01/2018 11:04 PM    Protein NEGATIVE 07/01/2018 11:04 PM    Glucose NEGATIVE 07/01/2018 11:04 PM    Ketone NEGATIVE 07/01/2018 11:04 PM    Bilirubin NEGATIVE 07/01/2018 11:04 PM    Urobilinogen 1.0 07/01/2018 11:04 PM    Nitrites NEGATIVE 07/01/2018 11:04 PM    Leukocyte Esterase NEGATIVE 07/01/2018 11:04 PM    Epithelial cells FEW 07/01/2018 11:04 PM    Bacteria NEGATIVE 07/01/2018 11:04 PM    WBC 0-4 07/01/2018 11:04 PM    RBC 0-5 07/01/2018 11:04 PM         Medications Reviewed:     Current Facility-Administered Medications   Medication Dose Route Frequency    sodium chloride (NS) flush 5-40 mL  5-40 mL IntraVENous Q8H    sodium chloride (NS) flush 5-40 mL  5-40 mL IntraVENous PRN    acetaminophen (TYLENOL) tablet 650 mg  650 mg Oral Q6H PRN    Or    acetaminophen (TYLENOL) suppository 650 mg  650 mg Rectal Q6H PRN    polyethylene glycol (MIRALAX) packet 17 g  17 g Oral DAILY PRN    ondansetron (ZOFRAN ODT) tablet 4 mg  4 mg Oral Q8H PRN    Or    ondansetron (ZOFRAN) injection 4 mg  4 mg IntraVENous Q6H PRN    enoxaparin (LOVENOX) injection 30 mg  30 mg SubCUTAneous Q12H    fentaNYL citrate (PF) injection 50 mcg  50 mcg IntraVENous Q4H PRN    oxyCODONE-acetaminophen (PERCOCET 10)  mg per tablet 1 Tablet  1 Tablet Oral Q4H PRN    piperacillin-tazobactam (ZOSYN) 3.375 g in 0.9% sodium chloride (MBP/ADV) 100 mL MBP  3.375 g IntraVENous Q8H    vancomycin (VANCOCIN) 1,250 mg in 0.9% sodium chloride 250 mL ([id number])  1,250 mg IntraVENous Q12H    vancomycin - pharmacy to dose   Other Rx Dosing/Monitoring     ______________________________________________________________________  EXPECTED LENGTH OF STAY: - - -  ACTUAL LENGTH OF STAY:          0                 Mariama Ellis MD

## 2022-10-13 NOTE — ED PROVIDER NOTES
59-year-old male with history of sleep apnea presents with complaints of 7 days increasing bilateral lower extremity swelling with pain in his left lower extremity greater than his right. [facility] (Keflex). Patient reports increasing pain over the past 3 days. Subjective fever. Patient reports associated days increasing shortness of breath with dyspnea on exertion. Denies history of DVT/PE. Unvaccinated for Sarina. Patient reports he was tested in urgent care center on October 6 and had a negative COVID test.  Denies cough, URI complaints. Denies urinary complaints. Regular marijuana use  Occasional alcohol use  Denies tobacco use       Past Medical History:   Diagnosis Date    Sleep apnea 2018       No past surgical history on file. Family History:   Problem Relation Age of Onset    Diabetes Mother        Social History     Socioeconomic History    Marital status: SINGLE     Spouse name: Not on file    Number of children: Not on file    Years of education: Not on file    Highest education level: Not on file   Occupational History    Not on file   Tobacco Use    Smoking status: Former     Packs/day: 0.50     Years: 5.00     Pack years: 2.50     Types: Cigarettes    Smokeless tobacco: Never   Substance and Sexual Activity    Alcohol use: No    Drug use: Yes     Frequency: 5.0 times per week     Types: Marijuana    Sexual activity: Yes     Partners: Female   Other Topics Concern    Not on file   Social History Narrative    Not on file     Social Determinants of Health     Financial Resource Strain: Not on file   Food Insecurity: Not on file   Transportation Needs: Not on file   Physical Activity: Not on file   Stress: Not on file   Social Connections: Not on file   Intimate Partner Violence: Not on file   Housing Stability: Not on file         ALLERGIES: Patient has no known allergies.     Review of Systems   Constitutional:  Negative for chills and fever.   HENT:  Negative for congestion, nosebleeds and sore throat. Eyes:  Negative for pain and discharge. Respiratory:  Positive for shortness of breath. Negative for cough. Cardiovascular:  Positive for leg swelling. Negative for chest pain and palpitations. Gastrointestinal:  Negative for abdominal pain, constipation, nausea and vomiting. Genitourinary:  Negative for decreased urine volume, dysuria, flank pain and urgency. Musculoskeletal:  Negative for gait problem and myalgias. Skin:  Negative for rash and wound. Neurological:  Negative for seizures and syncope. Hematological:  Does not bruise/bleed easily. Psychiatric/Behavioral:  Negative for confusion, self-injury and suicidal ideas. Vitals:    10/12/22 2004 10/12/22 2300   BP: (!) 164/95    Pulse: (!) 108    Resp: 18    Temp: 97.7 °F (36.5 °C)    SpO2: 95% 99%            Physical Exam  Vitals and nursing note reviewed. Constitutional:       Appearance: He is well-developed. He is obese. HENT:      Head: Normocephalic and atraumatic. Eyes:      Pupils: Pupils are equal, round, and reactive to light. Cardiovascular:      Rate and Rhythm: Normal rate and regular rhythm. Heart sounds: Normal heart sounds. Pulmonary:      Effort: Pulmonary effort is normal. No respiratory distress. Breath sounds: Normal breath sounds. No wheezing. Abdominal:      General: Bowel sounds are normal.      Palpations: Abdomen is soft. Tenderness: There is no abdominal tenderness. There is no guarding or rebound. Musculoskeletal:         General: Normal range of motion. Cervical back: Normal range of motion and neck supple. Right lower leg: Edema present. Left lower leg: Edema present. Comments: Pitting bilateral lower extremity edema with left greater than right   Skin:     General: Skin is warm and dry. Neurological:      Mental Status: He is alert and oriented to person, place, and time.    Psychiatric: Behavior: Behavior normal.        MDM  Number of Diagnoses or Management Options  Diagnosis management comments: 64-year-old male extremity swelling with left greater than right and increasing pain x3 days. No history of DVT/PE. Patient is afebrile, well-appearing, no respiratory distress, clear to auscultation bilaterally, 91% room air oxygen saturation lower extremity swelling left greater than right tenderness to palpation and pitting edema. Plan-EKG, CBC, CMP cardiac enzymes,, chest x-ray. CTA chest, lower extremity duplex. Amount and/or Complexity of Data Reviewed  Clinical lab tests: ordered and reviewed  Tests in the radiology section of CPT®: ordered and reviewed      ED Course as of 10/12/22 2349   Wed Oct 12, 2022   2027 ED EKG interpretation:  Rhythm: sinus tachycardia; and regular . Rate (approx.): 102; Axis: left axis deviation; P wave: normal; QRS interval: normal ; ST/T wave: normal; Other findings: no ischemia. This EKG was interpreted by Arley Quevedo MD,ED Provider.   [LA]      ED Course User Index  [LA] Michael Vogel MD       Procedures  1130PM  Desaturation to 88% on RA with sleeping. 11:56 PM  Change of shift. Care of patient signed over to [facility]. Bedside handoff complete. Awaiting CTA chest, duplex BLE.

## 2022-10-13 NOTE — ED NOTES
Patient is being admitted to the hospital.  The results of their tests and reasons for their admission have been discussed with them and/or available family. They convey agreement and understanding for the need to be admitted and for their admission diagnosis. Consultation will be made now with the inpatient physician for hospitalization. Perfect Serve Consult for Admission  1:17 AM    ED Room Number: ER24/24  Patient Name and age: Adriel Augustin 56 y.o.  male  Working Diagnosis:   1.  Hypoxia      COVID-19 Suspicion:  no  Sepsis present:  no  Reassessment needed: no  Code Status:  Full Code  Readmission: no  Isolation Requirements:  no  Recommended Level of Care:  telemetry  Department:[facility] Adult ED - 21   Other:  non-compliant with CPAP but current O2 sat 69% on 5L O2

## 2022-10-13 NOTE — CONSULTS
PULMONARY MEDICINE    Initial Physician Consultation Note    Name: Magalys Plunkett   : 1975   MRN: [id number]   Date: 10/13/2022      Subjective:   Consult Note: 10/13/2022   Requesting Physician: Dr. Sara Meeks  Reason for consult: Acute on chronic hypercapnic respiratory failure    Medical records and data reviewed. Patient is a 56 y.o. male who presented to the hospital with bilateral leg pain, left more than right. This has been worsening for the past 7 days. This was not associated with fever. He was noted to be lethargic on arrival and ABG had suggested partially compensated hypercarbic respiratory failure with hypoxia. We are asked to see him in consultation. History is limited from the patient who is falling asleep in the middle of a conversation. When aroused he does say he is feeling a little better than the time of admission and shortness of breath improves in a recliner in weight he is right now. He managed to awaken enough to say that he has been diagnosed with sleep apnea in the past but has been noncompliant with CPAP. Review of medical records and the [facility] shows that he underwent a diagnostic polysomnogram with apnea-hypopnea index of 40 events per hour has been followed by Dr. Nicholas Stewart and [facility]. Last contact was in 2021. He had been prescribed AutoPap at 5 to 15 cm of water pressure set up on 2020. He also has had refractory pedal edema. Venous ultrasound on admission did not show DVT. Last echocardiogram was several years ago and had not shown pulmonary hypertension for which he has is at increased risk given untreated sleep apnea. In the hospital he has had poorly controlled systemic hypertension as well. He is receiving antibiotics for possible cellulitis.   He has been a smoker in the past.  CT angiogram of the chest had not suggested presence of pulmonary pulm parenchymal or pleural abnormality, no pulmonary emboli was identified    Review of Systems:     A comprehensive 12 system review of systems was limited from the patient    Assessment:   Acute on chronic hypercarbic respiratory failure, respiratory acidosis is partially compensated  Untreated sleep apnea/obesity hypoventilation syndrome  Bilateral lower extremity edema, at risk for pulmonary hypertension  Lower extremity cellulitis  Uncontrolled systemic hypertension  Unknown FEV1  Other medical problems per chart    Recommendations:   BiPAP settings were adjusted-ordered for use with sleep and as needed during naps during the day  Could benefit from diuretics  Receiving antibiotics  Check echocardiogram -he is at risk for life-threatening pulmonary hypertension as complication from untreated sleep apnea  Recommend that he return to the sleep clinic with Dr. Obdulia Putnam immediately after discharge to reestablish care  He will need compliance with recommended PAP therapy, tracheostomy is another option  Discussed with RN    Active Problem List:     Problem List  Date Reviewed: 2/22/2021            Codes Class    Acute respiratory failure with hypoxia and hypercapnia ([facility] [address] ICD-10-CM: J96.01, J96.02  ICD-9-CM: 518.81         Cellulitis ICD-10-CM: L03.90  ICD-9-CM: 821. 9            Past Medical History:      has a past medical history of Sleep apnea (2018). Past Surgical History:      has no past surgical history on file. Home Medications:     Prior to Admission medications    Medication Sig Start Date End Date Taking? Authorizing Provider   acetaminophen (TYLENOL) 325 mg tablet Take 2 Tablets by mouth every six (6) hours as needed for Fever. 10/4/22   MARLENE Moore   cetirizine (ZyrTEC) 10 mg tablet Take 1 Tablet by mouth daily.  10/4/22   MARLENE Moore   gabapentin (NEURONTIN) 600 mg tablet TAKE 1 CAPSULE FOR 2 DAYS, THEN 2 CAPS AT BEDTIME FOR 2 DAYS, THEN 1 IN MORNING AND 2 IN EVENING  Patient not taking: Reported on 10/4/2022 8/3/20   Provider, Historical furosemide (LASIX) 40 mg tablet Take 1 Tab by mouth daily. Patient not taking: Reported on 10/4/2022 12/20/19   Nadia Hernandez MD       Allergies/Social/Family History:     No Known Allergies   Social History     Tobacco Use    Smoking status: Former     Packs/day: 0.50     Years: 5.00     Pack years: 2.50     Types: Cigarettes    Smokeless tobacco: Never   Substance Use Topics    Alcohol use: No      Family History   Problem Relation Age of Onset    Diabetes Mother             Objective:   Vital Signs:  Visit Vitals  BP (!) 179/79 (BP 1 Location: Right lower arm, BP Patient Position: At rest)   Pulse 95   Temp 98.1 °F (36.7 °C)   Resp 25   Ht 5' 8\" (1.727 m)   Wt 147 kg (324 lb)   SpO2 97%   BMI 49.26 kg/m²    O2 Flow Rate (L/min): 10 l/min O2 Device: None (Room air) Temp (24hrs), Av °F (36.7 °C), Min:97.7 °F (36.5 °C), Max:98.5 °F (36.9 °C)           Intake/Output:     Intake/Output Summary (Last 24 hours) at 10/13/2022 1207  Last data filed at 10/13/2022 0400  Gross per 24 hour   Intake --   Output 350 ml   Net -350 ml       Physical Exam:   General:  Drowsy, no distress, appears stated age. Head:  Normocephalic, without obvious abnormality, atraumatic. Eyes:  Conjunctivae/corneas clear. PERRL   Neck: Supple, symmetrical, no adenopathy, no carotid bruit and no JVD. Lungs:   Diminished breath sounds   Chest wall:  No tenderness or deformity. Heart:  Regular rate and rhythm, S1-S2 normal, no murmur, no click, rub or gallop. Abdomen:   Soft, non-tender. Bowel sounds present. No masses,  No organomegaly.    Extremities: Atraumatic, no cyanosis, bilateral 4+ edema   Pulses: Palpable   Skin: No rashes or lesions   Neurologic: Grossly nonfocal        LABS AND  DATA: Personally reviewed  Recent Labs     10/13/22  0943 10/12/22  2025   WBC 11.0 10.0   HGB 12.7 12.5   HCT 41.9 39.8    266     Recent Labs     10/13/22  0943 10/12/22  2025    137   K 4.3 4.2    105   CO2 32 29   BUN 10 13   CREA 1.01 1.22   GLU 95 100   CA 9.1 8.8   MG 2.1  --      Recent Labs     10/13/22  0943 10/12/22  2025   AP 66 65   TP 7.1 7.7   ALB 3.4* 3.4*   GLOB 3.7 4.3*     Recent Labs     10/13/22  0943   INR 1.0   PTP 10.7   APTT 34.8*      Recent Labs     10/13/22  0511   PHI 7.33*   PCO2I 57.7*   PO2I 148*   FIO2I 40     Recent Labs     10/13/22  0943          MEDS: Reviewed  Current Facility-Administered Medications   Medication Dose Route Frequency    sodium chloride (NS) flush 5-40 mL  5-40 mL IntraVENous Q8H    sodium chloride (NS) flush 5-40 mL  5-40 mL IntraVENous PRN    acetaminophen (TYLENOL) tablet 650 mg  650 mg Oral Q6H PRN    Or    acetaminophen (TYLENOL) suppository 650 mg  650 mg Rectal Q6H PRN    polyethylene glycol (MIRALAX) packet 17 g  17 g Oral DAILY PRN    ondansetron (ZOFRAN ODT) tablet 4 mg  4 mg Oral Q8H PRN    Or    ondansetron (ZOFRAN) injection 4 mg  4 mg IntraVENous Q6H PRN    enoxaparin (LOVENOX) injection 30 mg  30 mg SubCUTAneous Q12H    fentaNYL citrate (PF) injection 50 mcg  50 mcg IntraVENous Q4H PRN    oxyCODONE-acetaminophen (PERCOCET 10)  mg per tablet 1 Tablet  1 Tablet Oral Q4H PRN    piperacillin-tazobactam (ZOSYN) 3.375 g in 0.9% sodium chloride (MBP/ADV) 100 mL MBP  3.375 g IntraVENous Q8H    vancomycin (VANCOCIN) 2500 mg in  mL infusion  2,500 mg IntraVENous ONCE       Chest Imaging: personally reviewed and report checked  Results from Hospital Encounter encounter on 10/12/22    XR CHEST PA LAT    Narrative  EXAM: XR CHEST PA LAT    INDICATION: Sleep apnea    COMPARISON: October 4, 2022    TECHNIQUE: PA and lateral chest views    FINDINGS: The cardiomediastinal and hilar contours are within normal limits. The  pulmonary vasculature is within normal limits. The lungs and pleural spaces are clear. The visualized bones and upper abdomen  are age-appropriate. Impression  Normal PA and lateral chest views.     Results from [facility] encounter on 10/12/22    CT LOW EXT LT W CONT    Narrative  EXAM: CT LOW EXT LT W CONT    INDICATION: Left leg swelling, rule out abscess, compartment    COMPARISON: Left lower extremity venous duplex 7/15/2018    CONTRAST: 100 mL of Isovue-300. TECHNIQUE: Helical CT of the left lower extremity (from the distal femur through  the toes) during uneventful rapid bolus intravenous contrast administration. Coronal and Sagittal reformats were generated. Images reviewed in soft tissue  and bone windows. CT dose reduction was achieved through the use of a  standardized protocol tailored for this examination and automatic exposure  control for dose modulation. FINDINGS: Bones: Normal bone mineralization. No fracture, dislocation, or  periosteal reaction. Joint fluid: None. Articulations: Mild osteoarthritis involving the knee, ankle, and foot. Tendons: No full-thickness tendon tear. Muscles: No intramuscular hematoma. No focal atrophy. Soft tissues: Diffuse circumferential skin thickening and subcutaneous fat  stranding/edema and swelling in the lower leg, compatible with cellulitis. No  focal fluid collection to suggest abscess. 16 mm x 7 mm x 5 mm focus of nodular  cutaneous enhancement at the medial hindfoot (302-187, 304-208). Impression  Lower leg cellulitis. No abscess. Focal nodular cutaneous enhancing focus in the  medial hindfoot, correlate clinically. Tele- reviewed    Medical decision making:   I have reviewed the flowsheet and previous day's notes  Patient has acute or chronic illness that poses a threat to life or bodily function  Review and order of Clinical lab tests  Review and Order of Radiology tests  Independent visualization of Image  Reviewed Oxygen/ NiPPV  High Risk Drug therapy requiring intensive monitoring for toxicity: eg steroids, pressors, antibiotics        Thank you for allowing me to participate in this patient's care.     Rogers Cordoba MD      [facility]

## 2022-10-13 NOTE — ED TRIAGE NOTES
Pt arrives ambulatory from home for leg swelling and shortness of breath for 3 days. States his legs have been swollen for about a year and hasn't been seeing a primary care doc about it. [facility] a few days ago and given abx. A&OX4.

## 2022-10-13 NOTE — PROGRESS NOTES
Pt placed on CPAP at below settings per MD orders       10/13/22 0159   Oxygen Therapy   O2 Sat (%) 93 %   Pulse via Oximetry 98 beats per minute   FIO2 (%) 40 %   CPAP/BIPAP   CPAP/BIPAP Start/Stop On   Device Mode CPAP   [employer] ID # [id number]   Mask Type and Size Full face   PIP Observed 10.4 cm H20   EPAP (cm H2O) 10 cm H2O   Vt Spont (ml) 458 ml   Ve Observed (l/min) 11.5 l/min   Total RR (Spontaneous) 27 breaths per minute   Leak (Estimated) 33.5 L/min   Pt's Home Machine No   Settings Verified Yes

## 2022-10-13 NOTE — PROGRESS NOTES
Lovenox dose adjustment  Indication: DVT Prophylaxis  Recent Labs     10/12/22  2025   HGB 12.5      CREA 1.22     Current Weight: 147 kg  Est. CrCl = >100 ml/min  Current Dose: 40 mg subcutaneously every 24 hours.   Plan: Change to 30mg q12h    (

## 2022-10-13 NOTE — PROGRESS NOTES
I have received consult request for admission. Per chart review, patient is on 100% O2 non rebreather face mask. I have requested that ER MD consult with intensivist consultation.

## 2022-10-13 NOTE — PROGRESS NOTES
Pharmacy Note     Zosyn 4500 mg IV q8h ordered for treatment of SSTI. Per [facility] Renal / Extended Infusion B Lactam Policy, Zosyn will be changed to 4500 mg IV x 1 over 30 minutes, followed by 3375 mg IV q8h over 4 hours. Estimated Creatinine Clearance: Estimated Creatinine Clearance: 106.8 mL/min (based on SCr of 1.22 mg/dL). Dialysis Status, JAVIER, CKD:      BMI:  Body mass index is 49.26 kg/m². Recent Labs     10/12/22  2025   WBC 10.0     Temp (24hrs), Av.9 °F (36.6 °C), Min:97.7 °F (36.5 °C), Max:98 °F (36.7 °C)      Rationale for Adjustment:  Extended infusion policy. Pharmacy will continue to monitor and adjust dose as necessary. Please call with any questions.     Thank you,  Tona Amaya

## 2022-10-13 NOTE — PROGRESS NOTES
JENA:    RUR 8%    Disposition: Home    Transportation: Son or mother. Follow up: Referral sent to [facility] specialist for new PCP. --    Primary contact: Neetu Duggan Ankush(Mother) [phone]    Care Management Interventions  PCP Verified by CM: No (Referral sent to specialist for new PCP)  Mode of Transport at Discharge: Other (see comment) (Son or Mother)  MyChart Signup: Yes  Discharge Durable Medical Equipment: No  Physical Therapy Consult: No  Occupational Therapy Consult: No  Speech Therapy Consult: No  Support Systems: Parent(s), Child(rob)  The Patient and/or Patient Representative was Provided with a Choice of Provider and Agrees with the Discharge Plan?: Yes  Discharge Location  Patient Expects to be Discharged to[de-identified] Home with family assistance    Reason for Admission:  SOB                     RUR Score:  8%                   Plan for utilizing home health:      No orders    PCP: First and Last name:  None     Name of Practice:    Are you a current patient: Yes/No:    Approximate date of last visit:    Can you participate in a virtual visit with your PCP:                     Current Advanced Directive/Advance Care Plan: Full Code      Healthcare Decision Maker:   Click here to complete [place] including selection of the Healthcare Decision Maker Relationship (ie \"Primary\")                             Transition of Care Plan:       CM met with patient to introduce CM role, verify demographics and begin discharge planning. Patient lives alone in a one story apartment with no steps to enter. Patient is independent with ADLs and drives. Pharmacy: [facility] [place]. DME: None    No history of HH or rehab    Insurance: [employer].     Astrid Melton RN/CRM  [phone]

## 2022-10-13 NOTE — ED NOTES
Bedside shift change report given to Orly (oncoming nurse) by Prashanth Lugo (offgoing nurse). Report included the following information SBAR, ED Summary, Intake/Output, MAR and Recent Results.

## 2022-10-14 ENCOUNTER — APPOINTMENT (OUTPATIENT)
Dept: NON INVASIVE DIAGNOSTICS | Age: 47
DRG: 951 | End: 2022-10-14
Attending: INTERNAL MEDICINE
Payer: MEDICAID

## 2022-10-14 LAB
ALBUMIN SERPL-MCNC: 3.2 G/DL (ref 3.5–5)
ALBUMIN/GLOB SERPL: 0.7 {RATIO} (ref 1.1–2.2)
ALP SERPL-CCNC: 64 U/L (ref 45–117)
ALT SERPL-CCNC: 23 U/L (ref 12–78)
ANION GAP SERPL CALC-SCNC: 3 MMOL/L (ref 5–15)
AST SERPL-CCNC: 12 U/L (ref 15–37)
BASOPHILS # BLD: 0 K/UL (ref 0–0.1)
BASOPHILS NFR BLD: 0 % (ref 0–1)
BILIRUB SERPL-MCNC: 0.4 MG/DL (ref 0.2–1)
BUN SERPL-MCNC: 9 MG/DL (ref 6–20)
BUN/CREAT SERPL: 9 (ref 12–20)
CALCIUM SERPL-MCNC: 9.1 MG/DL (ref 8.5–10.1)
CHLORIDE SERPL-SCNC: 103 MMOL/L (ref 97–108)
CO2 SERPL-SCNC: 29 MMOL/L (ref 21–32)
CREAT SERPL-MCNC: 1.02 MG/DL (ref 0.7–1.3)
DIFFERENTIAL METHOD BLD: ABNORMAL
ECHO AO ROOT DIAM: 3.9 CM
ECHO AO ROOT INDEX: 1.54 CM/M2
ECHO AV PEAK GRADIENT: 20 MMHG
ECHO AV PEAK VELOCITY: 2.2 M/S
ECHO LA DIAMETER INDEX: 1.57 CM/M2
ECHO LA DIAMETER: 4 CM
ECHO LA TO AORTIC ROOT RATIO: 1.03
ECHO LV E' LATERAL VELOCITY: 14 CM/S
ECHO LV E' SEPTAL VELOCITY: 10 CM/S
ECHO LV FRACTIONAL SHORTENING: 35 % (ref 28–44)
ECHO LV INTERNAL DIMENSION DIASTOLE INDEX: 1.89 CM/M2
ECHO LV INTERNAL DIMENSION DIASTOLIC: 4.8 CM (ref 4.2–5.9)
ECHO LV INTERNAL DIMENSION SYSTOLIC INDEX: 1.22 CM/M2
ECHO LV INTERNAL DIMENSION SYSTOLIC: 3.1 CM
ECHO LV IVSD: 1.3 CM (ref 0.6–1)
ECHO LV MASS 2D: 232.2 G (ref 88–224)
ECHO LV MASS INDEX 2D: 91.4 G/M2 (ref 49–115)
ECHO LV POSTERIOR WALL DIASTOLIC: 1.2 CM (ref 0.6–1)
ECHO LV RELATIVE WALL THICKNESS RATIO: 0.5
ECHO LVOT AREA: 3.8 CM2
ECHO LVOT DIAM: 2.2 CM
ECHO MV A VELOCITY: 0.75 M/S
ECHO MV AREA PHT: 2.4 CM2
ECHO MV E DECELERATION TIME (DT): 312.6 MS
ECHO MV E VELOCITY: 0.78 M/S
ECHO MV E/A RATIO: 1.04
ECHO MV E/E' LATERAL: 5.57
ECHO MV E/E' RATIO (AVERAGED): 6.69
ECHO MV E/E' SEPTAL: 7.8
ECHO MV PRESSURE HALF TIME (PHT): 90.7 MS
ECHO PV MAX VELOCITY: 1.2 M/S
ECHO PV PEAK GRADIENT: 5 MMHG
ECHO RV FREE WALL PEAK S': 19 CM/S
ECHO RV INTERNAL DIMENSION: 3.9 CM
ECHO RV TAPSE: 2.8 CM (ref 1.7–?)
EOSINOPHIL # BLD: 0.1 K/UL (ref 0–0.4)
EOSINOPHIL NFR BLD: 1 % (ref 0–7)
ERYTHROCYTE [DISTWIDTH] IN BLOOD BY AUTOMATED COUNT: 14.5 % (ref 11.5–14.5)
GLOBULIN SER CALC-MCNC: 4.6 G/DL (ref 2–4)
GLUCOSE SERPL-MCNC: 110 MG/DL (ref 65–100)
HCT VFR BLD AUTO: 41.5 % (ref 36.6–50.3)
HGB BLD-MCNC: 12.9 G/DL (ref 12.1–17)
IMM GRANULOCYTES # BLD AUTO: 0.1 K/UL (ref 0–0.04)
IMM GRANULOCYTES NFR BLD AUTO: 1 % (ref 0–0.5)
LYMPHOCYTES # BLD: 1.2 K/UL (ref 0.8–3.5)
LYMPHOCYTES NFR BLD: 8 % (ref 12–49)
MAGNESIUM SERPL-MCNC: 2.3 MG/DL (ref 1.6–2.4)
MCH RBC QN AUTO: 29.2 PG (ref 26–34)
MCHC RBC AUTO-ENTMCNC: 31.1 G/DL (ref 30–36.5)
MCV RBC AUTO: 93.9 FL (ref 80–99)
MONOCYTES # BLD: 0.8 K/UL (ref 0–1)
MONOCYTES NFR BLD: 6 % (ref 5–13)
NEUTS SEG # BLD: 11.9 K/UL (ref 1.8–8)
NEUTS SEG NFR BLD: 84 % (ref 32–75)
NRBC # BLD: 0 K/UL (ref 0–0.01)
NRBC BLD-RTO: 0 PER 100 WBC
PLATELET # BLD AUTO: 300 K/UL (ref 150–400)
PMV BLD AUTO: 9.9 FL (ref 8.9–12.9)
POTASSIUM SERPL-SCNC: 4.1 MMOL/L (ref 3.5–5.1)
PROT SERPL-MCNC: 7.8 G/DL (ref 6.4–8.2)
RBC # BLD AUTO: 4.42 M/UL (ref 4.1–5.7)
SODIUM SERPL-SCNC: 135 MMOL/L (ref 136–145)
WBC # BLD AUTO: 14.1 K/UL (ref 4.1–11.1)

## 2022-10-14 PROCEDURE — 74011250636 HC RX REV CODE- 250/636: Performed by: HOSPITALIST

## 2022-10-14 PROCEDURE — 74011250637 HC RX REV CODE- 250/637: Performed by: FAMILY MEDICINE

## 2022-10-14 PROCEDURE — 85025 COMPLETE CBC W/AUTO DIFF WBC: CPT

## 2022-10-14 PROCEDURE — 74011250636 HC RX REV CODE- 250/636: Performed by: FAMILY MEDICINE

## 2022-10-14 PROCEDURE — 74011250637 HC RX REV CODE- 250/637: Performed by: HOSPITALIST

## 2022-10-14 PROCEDURE — 36415 COLL VENOUS BLD VENIPUNCTURE: CPT

## 2022-10-14 PROCEDURE — 80053 COMPREHEN METABOLIC PANEL: CPT

## 2022-10-14 PROCEDURE — 65660000001 HC RM ICU INTERMED STEPDOWN

## 2022-10-14 PROCEDURE — 99221 1ST HOSP IP/OBS SF/LOW 40: CPT | Performed by: NURSE PRACTITIONER

## 2022-10-14 PROCEDURE — 74011000250 HC RX REV CODE- 250: Performed by: FAMILY MEDICINE

## 2022-10-14 PROCEDURE — 94660 CPAP INITIATION&MGMT: CPT

## 2022-10-14 PROCEDURE — 74011000258 HC RX REV CODE- 258: Performed by: HOSPITALIST

## 2022-10-14 PROCEDURE — 83735 ASSAY OF MAGNESIUM: CPT

## 2022-10-14 PROCEDURE — 74011250636 HC RX REV CODE- 250/636: Performed by: INTERNAL MEDICINE

## 2022-10-14 PROCEDURE — 74011250637 HC RX REV CODE- 250/637: Performed by: INTERNAL MEDICINE

## 2022-10-14 PROCEDURE — 93306 TTE W/DOPPLER COMPLETE: CPT

## 2022-10-14 RX ORDER — LORAZEPAM 1 MG/1
1 TABLET ORAL ONCE
Status: COMPLETED | OUTPATIENT
Start: 2022-10-14 | End: 2022-10-14

## 2022-10-14 RX ORDER — CARVEDILOL 12.5 MG/1
25 TABLET ORAL 2 TIMES DAILY WITH MEALS
Status: DISCONTINUED | OUTPATIENT
Start: 2022-10-14 | End: 2022-10-21 | Stop reason: HOSPADM

## 2022-10-14 RX ORDER — HYDROMORPHONE HYDROCHLORIDE 1 MG/ML
1 INJECTION, SOLUTION INTRAMUSCULAR; INTRAVENOUS; SUBCUTANEOUS
Status: DISCONTINUED | OUTPATIENT
Start: 2022-10-14 | End: 2022-10-21 | Stop reason: HOSPADM

## 2022-10-14 RX ORDER — FUROSEMIDE 10 MG/ML
40 INJECTION INTRAMUSCULAR; INTRAVENOUS DAILY
Status: DISCONTINUED | OUTPATIENT
Start: 2022-10-15 | End: 2022-10-21 | Stop reason: HOSPADM

## 2022-10-14 RX ADMIN — PIPERACILLIN AND TAZOBACTAM 3.38 G: 3; .375 INJECTION, POWDER, FOR SOLUTION INTRAVENOUS at 09:04

## 2022-10-14 RX ADMIN — FUROSEMIDE 40 MG: 40 TABLET ORAL at 09:04

## 2022-10-14 RX ADMIN — LORAZEPAM 1 MG: 1 TABLET ORAL at 23:11

## 2022-10-14 RX ADMIN — CARVEDILOL 12.5 MG: 12.5 TABLET, FILM COATED ORAL at 09:04

## 2022-10-14 RX ADMIN — SODIUM CHLORIDE, PRESERVATIVE FREE 10 ML: 5 INJECTION INTRAVENOUS at 22:17

## 2022-10-14 RX ADMIN — PIPERACILLIN AND TAZOBACTAM 3.38 G: 3; .375 INJECTION, POWDER, FOR SOLUTION INTRAVENOUS at 15:43

## 2022-10-14 RX ADMIN — PIPERACILLIN AND TAZOBACTAM 3.38 G: 3; .375 INJECTION, POWDER, FOR SOLUTION INTRAVENOUS at 00:51

## 2022-10-14 RX ADMIN — ACETAMINOPHEN 650 MG: 325 TABLET ORAL at 21:11

## 2022-10-14 RX ADMIN — VANCOMYCIN HYDROCHLORIDE 1250 MG: 1.25 INJECTION, POWDER, LYOPHILIZED, FOR SOLUTION INTRAVENOUS at 22:16

## 2022-10-14 RX ADMIN — OXYCODONE AND ACETAMINOPHEN 1 TABLET: 10; 325 TABLET ORAL at 08:27

## 2022-10-14 RX ADMIN — VANCOMYCIN HYDROCHLORIDE 1250 MG: 1.25 INJECTION, POWDER, LYOPHILIZED, FOR SOLUTION INTRAVENOUS at 09:04

## 2022-10-14 RX ADMIN — OXYCODONE AND ACETAMINOPHEN 1 TABLET: 10; 325 TABLET ORAL at 13:00

## 2022-10-14 RX ADMIN — HYDROMORPHONE HYDROCHLORIDE 1 MG: 1 INJECTION, SOLUTION INTRAMUSCULAR; INTRAVENOUS; SUBCUTANEOUS at 22:25

## 2022-10-14 RX ADMIN — HYDROMORPHONE HYDROCHLORIDE 1 MG: 1 INJECTION, SOLUTION INTRAMUSCULAR; INTRAVENOUS; SUBCUTANEOUS at 15:43

## 2022-10-14 RX ADMIN — MORPHINE SULFATE 2 MG: 2 INJECTION, SOLUTION INTRAMUSCULAR; INTRAVENOUS at 06:06

## 2022-10-14 RX ADMIN — ENOXAPARIN SODIUM 30 MG: 100 INJECTION SUBCUTANEOUS at 09:04

## 2022-10-14 RX ADMIN — ENOXAPARIN SODIUM 30 MG: 100 INJECTION SUBCUTANEOUS at 22:16

## 2022-10-14 RX ADMIN — SODIUM CHLORIDE, PRESERVATIVE FREE 10 ML: 5 INJECTION INTRAVENOUS at 06:07

## 2022-10-14 RX ADMIN — MORPHINE SULFATE 2 MG: 2 INJECTION, SOLUTION INTRAMUSCULAR; INTRAVENOUS at 01:59

## 2022-10-14 RX ADMIN — LORAZEPAM 1 MG: 1 TABLET ORAL at 03:57

## 2022-10-14 RX ADMIN — CARVEDILOL 25 MG: 12.5 TABLET, FILM COATED ORAL at 17:27

## 2022-10-14 NOTE — PROGRESS NOTES
Care plan reviewed. Problem: Falls - Risk of  Goal: *Absence of Falls  Description: Document Ledy Macario Fall Risk and appropriate interventions in the flowsheet.   Outcome: Progressing Towards Goal  Note: Fall Risk Interventions:            Medication Interventions: Teach patient to arise slowly         History of Falls Interventions: Bed/chair exit alarm, Door open when patient unattended         Problem: Patient Education: Go to Patient Education Activity  Goal: Patient/Family Education  Outcome: Progressing Towards Goal

## 2022-10-14 NOTE — CONSULTS
[facility]  Inpatient Consultation        Admit Date: 10/12/2022  Reason for Consultation: RLE cellulitis    HPI:  Magalys Plunkett is a 56 y.o. male whom we are asked to see in [facility]. Pt was admitted 2 days ago w/ increasing bilateral LE swelling and pain. He also has sleep apnea and falls asleep during a conversation. He and wife say the pain started a month ago but swelling set in 2 weeks ago and legs got \"darker\" (calves). CT shows no abscess in the LLE. WBC up to 14.1K today. He does complain of some numbness and tingling of R foot. Patient Active Problem List    Diagnosis Date Noted    Acute respiratory failure with hypoxia and hypercapnia ([facility] [address] 10/13/2022    Cellulitis 08/27/2019     Past Medical History:   Diagnosis Date    Sleep apnea 2018      No past surgical history on file. Social History     Tobacco Use    Smoking status: Former     Packs/day: 0.50     Years: 5.00     Pack years: 2.50     Types: Cigarettes    Smokeless tobacco: Never   Substance Use Topics    Alcohol use: No      Family History   Problem Relation Age of Onset    Diabetes Mother       Prior to Admission medications    Medication Sig Start Date End Date Taking? Authorizing Provider   acetaminophen (TYLENOL) 325 mg tablet Take 2 Tablets by mouth every six (6) hours as needed for Fever. 10/4/22   MARLENE Vera   cetirizine (ZyrTEC) 10 mg tablet Take 1 Tablet by mouth daily. 10/4/22   MARLENE Vera   gabapentin (NEURONTIN) 600 mg tablet TAKE 1 CAPSULE FOR 2 DAYS, THEN 2 CAPS AT BEDTIME FOR 2 DAYS, THEN 1 IN MORNING AND 2 IN EVENING  Patient not taking: Reported on 10/4/2022 8/3/20   Provider, Historical   furosemide (LASIX) 40 mg tablet Take 1 Tab by mouth daily.   Patient not taking: Reported on 10/4/2022 12/20/19   Kimo Brewer MD     Current Facility-Administered Medications   Medication Dose Route Frequency    [START ON 10/15/2022] vancomycin random level 10/15 with AM labs    Other ONCE    [START ON 10/15/2022] furosemide (LASIX) injection 40 mg  40 mg IntraVENous DAILY    HYDROmorphone (DILAUDID) injection 1 mg  1 mg IntraVENous Q4H PRN    carvediloL (COREG) tablet 25 mg  25 mg Oral BID WITH MEALS    sodium chloride (NS) flush 5-40 mL  5-40 mL IntraVENous Q8H    sodium chloride (NS) flush 5-40 mL  5-40 mL IntraVENous PRN    acetaminophen (TYLENOL) tablet 650 mg  650 mg Oral Q6H PRN    Or    acetaminophen (TYLENOL) suppository 650 mg  650 mg Rectal Q6H PRN    polyethylene glycol (MIRALAX) packet 17 g  17 g Oral DAILY PRN    ondansetron (ZOFRAN ODT) tablet 4 mg  4 mg Oral Q8H PRN    Or    ondansetron (ZOFRAN) injection 4 mg  4 mg IntraVENous Q6H PRN    enoxaparin (LOVENOX) injection 30 mg  30 mg SubCUTAneous Q12H    oxyCODONE-acetaminophen (PERCOCET 10)  mg per tablet 1 Tablet  1 Tablet Oral Q4H PRN    piperacillin-tazobactam (ZOSYN) 3.375 g in 0.9% sodium chloride (MBP/ADV) 100 mL MBP  3.375 g IntraVENous Q8H    vancomycin (VANCOCIN) 1,250 mg in 0.9% sodium chloride 250 mL ([id number])  1,250 mg IntraVENous Q12H    vancomycin - pharmacy to dose   Other Rx Dosing/Monitoring    hydrALAZINE (APRESOLINE) 20 mg/mL injection 10 mg  10 mg IntraVENous Q6H PRN     No Known Allergies       Subjective:     Review of Systems:    A comprehensive review of systems was negative except for that written in the History of Present Illness. Objective:     Blood pressure (!) 161/84, pulse 93, temperature 98.8 °F (37.1 °C), resp. rate 24, height 5' 8\" (1.727 m), weight 332 lb (150.6 kg), SpO2 95 %.   Temp (24hrs), Av.4 °F (36.9 °C), Min:97.9 °F (36.6 °C), Max:98.8 °F (37.1 °C)      Recent Labs     10/14/22  0403 10/13/22  0943 10/12/22  2025   WBC 14.1* 11.0 10.0   HGB 12.9 12.7 12.5   HCT 41.5 41.9 39.8    263 266     Recent Labs     10/14/22  0403 10/13/22  0943 10/12/22  2025   * 137 137   K 4.1 4.3 4.2    102 105   CO2 29 32 29 * 95 100   BUN 9 10 13   CREA 1.02 1.01 1.22   CA 9.1 9.1 8.8   MG 2.3 2.1  --    ALB 3.2* 3.4* 3.4*   TBILI 0.4 0.5 0.2   ALT 23 26 30   INR  --  1.0  --      No results for input(s): AML, LPSE in the last 72 hours. Intake/Output Summary (Last 24 hours) at 10/14/2022 1401  Last data filed at 10/14/2022 1228  Gross per 24 hour   Intake 450 ml   Output 2800 ml   Net -2350 ml     Date 10/14/22 0700 - 10/15/22 0659   Shift 2224-9949 0092-3559 7564-8805 24 Hour Total   INTAKE   P.O. 100   100   I. V.(mL/kg/hr) 350   350   Shift Total(mL/kg) 450(3)   450(3)   OUTPUT   Urine(mL/kg/hr) 750   750   Shift Total(mL/kg) 750(5)   750(5)   Weight (kg) 150.6 150.6 150.6 150.6     _____________________  Physical Exam:     General:  Alert, falls off to sleep in conversation w/ snoring   Eyes:   Sclera clear. Throat: Lips, mucosa, and tongue normal.   Neck: Supple, symmetrical, trachea midline. Lungs:   Clear to auscultation bilaterally. Heart:  Regular rate and rhythm. Abdomen:   Normal BS, obese Soft, non-tender. No masses,  No organomegaly. Extremities: Bilateral LE w/ cellulitis, warmth; L leg w/ 2 fluctuant areas; no drainage or color change; has strong pedal pulse bilaterally    Skin: Skin color, texture, turgor normal. No rashes or lesions. Assessment:   Active Problems:    Acute respiratory failure with hypoxia and hypercapnia ([address] (10/13/2022)            Plan:     If concerned about compartment syndrome would consult orthopedics -  May need CT of R leg as well  We will sign off - d/w Dr Minerva Diaz  Thank you for allowing us to participate in the care of this patient. Total time spent with patient: 30 minutes. Signed By: Mook Hoyos NP     October 14, 2022      Agree with NP assessment and plan.   Right lower extremity cellulitis, without drainable abscess or clinical signs of compartment syndrome based on pulses, normal neurovascular exam.  Recommended orthopedic consultation if concern for compartment syndrome persists. Otherwise continue antibiotics, elevation of extremity, trending of white blood cell count as focal fluid collection/abscess could develop over the next several days; low threshold to reimage with ultrasound or CT. We will follow with you.

## 2022-10-14 NOTE — PROGRESS NOTES
[address] [facility]                                                                                          Hospitalist Progress Note  Ralph Willams MD  Answering service: [phone] from in house phone        Date of Service:  10/14/2022  NAME:  Anaid Peraza  :  1975  MRN:  [id number]      Admission Summary:   Anaid Peraza is a 56 y.o. male  with past medical history of obstructive sleep apnea, class III obesity, lymphedema presented to the emergency department via private transport from home with reported initial chief complaint of shortness of breath and leg swelling. on admission the patient is obtunded and not providing history. As such majority of history was obtained per review of ED and electronic medical records. Per ER triage note, patient arrived awake alert oriented x4. He reportedly had chronic leg edema and swelling over the past year. Leg swelling and shortness of breath notably worsened over the past prior 3 days. He initially went to [facility] urgent care center a few days ago and has been given antibiotics for unknown diagnosis. On arrival emergency department tonight, initial recorded vital signs temperature 97.7 °F, /95, heart rate 108, respirate 18, O2 saturation 95% on room air. Per chart records patient was placed on 2 L, followed by 5 L of O2 via nasal cannula and then a hundredths and nonrebreather. Venous blood gas showed pH 7.32, PCO2 55.4, PO2 66, HCO3 28.2, base excess 0.8, SaO2 90.2%. CTA chest with IV contrast was negative for PE and showed no acute process. 12 EKG shows sinus tachycardia, right bundle branch block, left anterior fascicular block 102 bpm.  ED requested admission to the hospitalist service.   However patient was requiring a hundredths and nonrebreather facemask and high risk of deterioration, I request the ED consult with ICU intensivist.  On arrival at the patient's bedside, patient was on CPAP with O2 saturation 99%. As such, orders were placed for admission to the hospitalist service. Interval history / Subjective:   Severe left leg pain and swelling  Said : he was given oral abx at [facility] for this leg infection 2 days ago   Severe sleep apnea noticed      Assessment & Plan:      Acute on chronic hypercarbic respiratory failure, respiratory acidosis is partially compensated  Untreated sleep apnea/obesity hypoventilation syndrome  Pulmonologist consulted  Continue Bipap HS and prn at nap time  Recommend that he return to the sleep clinic with Dr. Merry Charles immediately after discharge to reestablish care  He will need compliance with recommended PAP therapy, tracheostomy is another option  Diuresis as long cr tolerate     Acute left leg cellulitis:  Stat leg CT no abscess, no acute compartment, check CPK   Continue iv vanco and zosyn for now  WBC worse   Still quite swelling/tender consult general surgeon for the concerning of compartment. dorsalis pulse palpable. No right foot numbness for now ,      Bilateral lower extremity edema/lymphedema   Prn diuresis   Keep legs elevated      Uncontrolled systemic hypertension    Acute metabolic encephalopathy/Altered mental status  -due to hypercapnia. As such change settings to BiPAP and correct respiratory acidosis  -resolved now       Tachycardia   -continue telemetry monitoring     Class 3 obesity  -BMI 49.26 kg/m2  -would urge eventual weight loss, heart healthy diet, lifestyle modification       Code status: full   Prophylaxis: sc lovenox   Care Plan discussed with: pt, cm, rn,   Anticipated Disposition: TBD, home >48 hours      Hospital Problems  Date Reviewed: 2/22/2021            Codes Class Noted POA    Acute respiratory failure with hypoxia and hypercapnia (HCC) ICD-10-CM: J96.01, J96.02  ICD-9-CM: 518.81  10/13/2022 Unknown           Review of Systems:   A comprehensive review of systems was negative except for that written in the HPI.        Vital Signs:    Last 24hrs VS reviewed since prior progress note. Most recent are:  Visit Vitals  /80 (BP 1 Location: Right upper arm, BP Patient Position: At rest;Lying)   Pulse (!) 112   Temp 98 °F (36.7 °C)   Resp 24   Ht 5' 8\" (1.727 m)   Wt 150.9 kg (332 lb 10.8 oz)   SpO2 95%   BMI 50.58 kg/m²         Intake/Output Summary (Last 24 hours) at 10/14/2022 1133  Last data filed at 10/14/2022 1024  Gross per 24 hour   Intake 450 ml   Output 2800 ml   Net -2350 ml          Physical Examination:     I had a face to face encounter with this patient and independently examined them on 10/14/2022 as outlined below:          Constitutional:  No acute distress, cooperative, pleasant    ENT:  Oral mucosa moist, oropharynx benign. Resp:  CTA bilaterally. No wheezing/rhonchi/rales. No accessory muscle use. CV:  Regular rhythm, normal rate, no murmurs, gallops, rubs    GI:  Soft, non distended, non tender. normoactive bowel sounds, no hepatosplenomegaly     Musculoskeletal:  Left leg swelling, noticed a nodular induration at lower leg. Tender to touch. Pulses palpable     Neurologic:  Moves all extremities. AAOx3, CN II-XII reviewed            Data Review:    Review and/or order of clinical lab test      Labs:     Recent Labs     10/14/22  0403 10/13/22  0943   WBC 14.1* 11.0   HGB 12.9 12.7   HCT 41.5 41.9    263       Recent Labs     10/14/22  0403 10/13/22  0943 10/12/22  2025   * 137 137   K 4.1 4.3 4.2    102 105   CO2 29 32 29   BUN 9 10 13   CREA 1.02 1.01 1.22   * 95 100   CA 9.1 9.1 8.8   MG 2.3 2.1  --        Recent Labs     10/14/22  0403 10/13/22  0943 10/12/22  2025   ALT 23 26 30   AP 64 66 65   TBILI 0.4 0.5 0.2   TP 7.8 7.1 7.7   ALB 3.2* 3.4* 3.4*   GLOB 4.6* 3.7 4.3*       Recent Labs     10/13/22  0943   INR 1.0   PTP 10.7   APTT 34.8*        No results for input(s): FE, TIBC, PSAT, FERR in the last 72 hours.    No results found for: FOL, RBCF   No results for input(s): PH, PCO2, PO2 in the last 72 hours.   Recent Labs     10/13/22  0943          Lab Results   Component Value Date/Time    Cholesterol, total 147 05/07/2013 12:49 PM    HDL Cholesterol 37 (L) 05/07/2013 12:49 PM    LDL, calculated 98 05/07/2013 12:49 PM    Triglyceride 62 05/07/2013 12:49 PM     No results found for: [facility]  Lab Results   Component Value Date/Time    Color YELLOW/STRAW 07/01/2018 11:04 PM    Appearance CLEAR 07/01/2018 11:04 PM    Specific gravity 1.026 07/01/2018 11:04 PM    pH (UA) 5.5 07/01/2018 11:04 PM    Protein NEGATIVE 07/01/2018 11:04 PM    Glucose NEGATIVE 07/01/2018 11:04 PM    Ketone NEGATIVE 07/01/2018 11:04 PM    Bilirubin NEGATIVE 07/01/2018 11:04 PM    Urobilinogen 1.0 07/01/2018 11:04 PM    Nitrites NEGATIVE 07/01/2018 11:04 PM    Leukocyte Esterase NEGATIVE 07/01/2018 11:04 PM    Epithelial cells FEW 07/01/2018 11:04 PM    Bacteria NEGATIVE 07/01/2018 11:04 PM    WBC 0-4 07/01/2018 11:04 PM    RBC 0-5 07/01/2018 11:04 PM         Medications Reviewed:     Current Facility-Administered Medications   Medication Dose Route Frequency    [START ON 10/15/2022] vancomycin random level 10/15 with AM labs    Other ONCE    sodium chloride (NS) flush 5-40 mL  5-40 mL IntraVENous Q8H    sodium chloride (NS) flush 5-40 mL  5-40 mL IntraVENous PRN    acetaminophen (TYLENOL) tablet 650 mg  650 mg Oral Q6H PRN    Or    acetaminophen (TYLENOL) suppository 650 mg  650 mg Rectal Q6H PRN    polyethylene glycol (MIRALAX) packet 17 g  17 g Oral DAILY PRN    ondansetron (ZOFRAN ODT) tablet 4 mg  4 mg Oral Q8H PRN    Or    ondansetron (ZOFRAN) injection 4 mg  4 mg IntraVENous Q6H PRN    enoxaparin (LOVENOX) injection 30 mg  30 mg SubCUTAneous Q12H    oxyCODONE-acetaminophen (PERCOCET 10)  mg per tablet 1 Tablet  1 Tablet Oral Q4H PRN    piperacillin-tazobactam (ZOSYN) 3.375 g in 0.9% sodium chloride (MBP/ADV) 100 mL MBP  3.375 g IntraVENous Q8H    vancomycin (VANCOCIN) 1,250 mg in 0.9% sodium chloride 250 mL ([id number])  1,250 mg IntraVENous Q12H    vancomycin - pharmacy to dose   Other Rx Dosing/Monitoring    furosemide (LASIX) tablet 40 mg  40 mg Oral DAILY    carvediloL (COREG) tablet 12.5 mg  12.5 mg Oral BID WITH MEALS    hydrALAZINE (APRESOLINE) 20 mg/mL injection 10 mg  10 mg IntraVENous Q6H PRN    morphine injection 2 mg  2 mg IntraVENous Q4H PRN     ______________________________________________________________________  EXPECTED LENGTH OF STAY: - - -  ACTUAL LENGTH OF STAY:          1                 Filomena Kowalski MD

## 2022-10-14 NOTE — PROGRESS NOTES
PULMONARY MEDICINE    Progress Note    Name: Haroon Vela   : 1975   MRN: [id number]   Date: 10/14/2022      Subjective:     10/14  Significant apnea while when I entered the room. He continues to refuse his NIV  ECHO pending     Consult Note: 10/13  Requesting Physician: Dr. Apolonia Correa  Reason for consult: Acute on chronic hypercapnic respiratory failure    Medical records and data reviewed. Patient is a 56 y.o. male who presented to the hospital with bilateral leg pain, left more than right. This has been worsening for the past 7 days. This was not associated with fever. He was noted to be lethargic on arrival and ABG had suggested partially compensated hypercarbic respiratory failure with hypoxia. We are asked to see him in consultation. History is limited from the patient who is falling asleep in the middle of a conversation. When aroused he does say he is feeling a little better than the time of admission and shortness of breath improves in a recliner in weight he is right now. He managed to awaken enough to say that he has been diagnosed with sleep apnea in the past but has been noncompliant with CPAP. Review of medical records and the [facility] shows that he underwent a diagnostic polysomnogram with apnea-hypopnea index of 40 events per hour has been followed by Dr. Dave Gonzalez and [facility] sleep clinic. Last contact was in 2021. He had been prescribed AutoPap at 5 to 15 cm of water pressure set up on 2020. He also has had refractory pedal edema. Venous ultrasound on admission did not show DVT. Last echocardiogram was several years ago and had not shown pulmonary hypertension for which he has is at increased risk given untreated sleep apnea. In the hospital he has had poorly controlled systemic hypertension as well. He is receiving antibiotics for possible cellulitis.   He has been a smoker in the past.  CT angiogram of the chest had not suggested presence of pulmonary pulm parenchymal or pleural abnormality, no pulmonary emboli was identified    Review of Systems:     A comprehensive 12 system review of systems was limited from the patient    Assessment:   Acute on chronic hypercarbic respiratory failure, respiratory acidosis is partially compensated  Untreated sleep apnea/obesity hypoventilation syndrome  Bilateral lower extremity edema, at risk for pulmonary hypertension  Lower extremity cellulitis  Uncontrolled systemic hypertension  Unknown FEV1  Other medical problems per chart    Recommendations:   BiPAP settings were adjusted-ordered for use with sleep and as needed during naps during the day  Could benefit from diuretics  Receiving antibiotics  Check echocardiogram -he is at risk for life-threatening pulmonary hypertension as complication from untreated sleep apnea pending   Recommend that he return to the sleep clinic with Dr. Dave Gonzalez immediately after discharge to reestablish care. He will need compliance with recommended PAP therapy, tracheostomy or an Inspire by Dr Lugo Cancer at [address] [place] [address] are other options. Discussed with RN  PRN over the weekend     Active Problem List:     Problem List  Date Reviewed: 2/22/2021            Codes Class    Acute respiratory failure with hypoxia and hypercapnia (HCC) ICD-10-CM: J96.01, J96.02  ICD-9-CM: 518.81         Cellulitis ICD-10-CM: L03.90  ICD-9-CM: 682.9          Past Medical History:      has a past medical history of Sleep apnea (2018). Past Surgical History:      has no past surgical history on file. Home Medications:     Prior to Admission medications    Medication Sig Start Date End Date Taking? Authorizing Provider   acetaminophen (TYLENOL) 325 mg tablet Take 2 Tablets by mouth every six (6) hours as needed for Fever. 10/4/22   MARLENE Sorenson   cetirizine (ZyrTEC) 10 mg tablet Take 1 Tablet by mouth daily.  10/4/22   MARLENE Sorenson   gabapentin (NEURONTIN) 600 mg tablet TAKE 1 CAPSULE FOR 2 DAYS, THEN 2 CAPS AT BEDTIME FOR 2 DAYS, THEN 1 IN MORNING AND 2 IN EVENING  Patient not taking: Reported on 10/4/2022 8/3/20   Provider, Historical   furosemide (LASIX) 40 mg tablet Take 1 Tab by mouth daily. Patient not taking: Reported on 10/4/2022 12/20/19   Ana Villela MD       Allergies/Social/Family History:     No Known Allergies   Social History     Tobacco Use    Smoking status: Former     Packs/day: 0.50     Years: 5.00     Pack years: 2.50     Types: Cigarettes    Smokeless tobacco: Never   Substance Use Topics    Alcohol use: No      Family History   Problem Relation Age of Onset    Diabetes Mother             Objective:   Vital Signs:  Visit Vitals  /80 (BP 1 Location: Right upper arm, BP Patient Position: At rest;Lying)   Pulse (!) 112   Temp 98 °F (36.7 °C)   Resp 24   Ht 5' 8\" (1.727 m)   Wt 150.9 kg (332 lb 10.8 oz)   SpO2 95%   BMI 50.58 kg/m²    O2 Flow Rate (L/min): 8 l/min O2 Device: None (Room air) Temp (24hrs), Av.4 °F (36.9 °C), Min:97.9 °F (36.6 °C), Max:98.8 °F (37.1 °C)           Intake/Output:     Intake/Output Summary (Last 24 hours) at 10/14/2022 1123  Last data filed at 10/14/2022 1024  Gross per 24 hour   Intake 450 ml   Output 2800 ml   Net -2350 ml         Physical Exam:   General:  Drowsy, no distress, appears stated age. Head:  Normocephalic, without obvious abnormality, atraumatic. Eyes:  Conjunctivae/corneas clear. PERRL   Neck: Supple, symmetrical, no adenopathy, no carotid bruit and no JVD. Lungs:   Diminished breath sounds   Chest wall:  No tenderness or deformity. Heart:  Regular rate and rhythm, S1-S2 normal, no murmur, no click, rub or gallop. Abdomen:   Soft, non-tender. Bowel sounds present. No masses,  No organomegaly.    Extremities: Atraumatic, no cyanosis, bilateral 4+ edema   Pulses: Palpable   Skin: No rashes or lesions   Neurologic: Grossly nonfocal        LABS AND  DATA: Personally reviewed  Recent Labs     10/14/22  0403 10/13/22  5006   WBC 14.1* 11.0 HGB 12.9 12.7   HCT 41.5 41.9    263       Recent Labs     10/14/22  0403 10/13/22  0943   * 137   K 4.1 4.3    102   CO2 29 32   BUN 9 10   CREA 1.02 1.01   * 95   CA 9.1 9.1   MG 2.3 2.1       Recent Labs     10/14/22  0403 10/13/22  0943   AP 64 66   TP 7.8 7.1   ALB 3.2* 3.4*   GLOB 4.6* 3.7       Recent Labs     10/13/22  0943   INR 1.0   PTP 10.7   APTT 34.8*        Recent Labs     10/13/22  0511   PHI 7.33*   PCO2I 57.7*   PO2I 148*   FIO2I 40       Recent Labs     10/13/22  0943            MEDS: Reviewed  Current Facility-Administered Medications   Medication Dose Route Frequency    [START ON 10/15/2022] vancomycin random level 10/15 with AM labs    Other ONCE    sodium chloride (NS) flush 5-40 mL  5-40 mL IntraVENous Q8H    sodium chloride (NS) flush 5-40 mL  5-40 mL IntraVENous PRN    acetaminophen (TYLENOL) tablet 650 mg  650 mg Oral Q6H PRN    Or    acetaminophen (TYLENOL) suppository 650 mg  650 mg Rectal Q6H PRN    polyethylene glycol (MIRALAX) packet 17 g  17 g Oral DAILY PRN    ondansetron (ZOFRAN ODT) tablet 4 mg  4 mg Oral Q8H PRN    Or    ondansetron (ZOFRAN) injection 4 mg  4 mg IntraVENous Q6H PRN    enoxaparin (LOVENOX) injection 30 mg  30 mg SubCUTAneous Q12H    oxyCODONE-acetaminophen (PERCOCET 10)  mg per tablet 1 Tablet  1 Tablet Oral Q4H PRN    piperacillin-tazobactam (ZOSYN) 3.375 g in 0.9% sodium chloride (MBP/ADV) 100 mL MBP  3.375 g IntraVENous Q8H    vancomycin (VANCOCIN) 1,250 mg in 0.9% sodium chloride 250 mL ([id number])  1,250 mg IntraVENous Q12H    vancomycin - pharmacy to dose   Other Rx Dosing/Monitoring    furosemide (LASIX) tablet 40 mg  40 mg Oral DAILY    carvediloL (COREG) tablet 12.5 mg  12.5 mg Oral BID WITH MEALS    hydrALAZINE (APRESOLINE) 20 mg/mL injection 10 mg  10 mg IntraVENous Q6H PRN    morphine injection 2 mg  2 mg IntraVENous Q4H PRN       Chest Imaging: personally reviewed and report checked  Results from [facility] Encounter encounter on 10/12/22    XR CHEST PA LAT    Narrative  EXAM: XR CHEST PA LAT    INDICATION: Sleep apnea    COMPARISON: October 4, 2022    TECHNIQUE: PA and lateral chest views    FINDINGS: The cardiomediastinal and hilar contours are within normal limits. The  pulmonary vasculature is within normal limits. The lungs and pleural spaces are clear. The visualized bones and upper abdomen  are age-appropriate. Impression  Normal PA and lateral chest views. Results from [place] encounter on 10/12/22    CT LOW EXT LT W CONT    Narrative  EXAM: CT LOW EXT LT W CONT    INDICATION: Left leg swelling, rule out abscess, compartment    COMPARISON: Left lower extremity venous duplex 7/15/2018    CONTRAST: 100 mL of Isovue-300. TECHNIQUE: Helical CT of the left lower extremity (from the distal femur through  the toes) during uneventful rapid bolus intravenous contrast administration. Coronal and Sagittal reformats were generated. Images reviewed in soft tissue  and bone windows. CT dose reduction was achieved through the use of a  standardized protocol tailored for this examination and automatic exposure  control for dose modulation. FINDINGS: Bones: Normal bone mineralization. No fracture, dislocation, or  periosteal reaction. Joint fluid: None. Articulations: Mild osteoarthritis involving the knee, ankle, and foot. Tendons: No full-thickness tendon tear. Muscles: No intramuscular hematoma. No focal atrophy. Soft tissues: Diffuse circumferential skin thickening and subcutaneous fat  stranding/edema and swelling in the lower leg, compatible with cellulitis. No  focal fluid collection to suggest abscess. 16 mm x 7 mm x 5 mm focus of nodular  cutaneous enhancement at the medial hindfoot (302-187, 304-208). Impression  Lower leg cellulitis. No abscess. Focal nodular cutaneous enhancing focus in the  medial hindfoot, correlate clinically.         Tele- reviewed    Medical decision making:   I have reviewed the flowsheet and previous day's notes  Patient has acute or chronic illness that poses a threat to life or bodily function  Review and order of Clinical lab tests  Review and Order of Radiology tests  Independent visualization of Image  Reviewed Oxygen/ NiPPV  High Risk Drug therapy requiring intensive monitoring for toxicity: eg steroids, pressors, antibiotics        Thank you for allowing me to participate in this patient's care.     Stalin Pineda PA-C      [facility]

## 2022-10-14 NOTE — PROGRESS NOTES
Bedside shift change report given to Aliya Ayala RN (oncoming nurse) by Maile Segla RN (offgoing nurse).  Report included the following information SBAR, Kardex, ED Summary, Intake/Output, MAR, Accordion, Recent Results, and Cardiac Rhythm NSR-ST .

## 2022-10-14 NOTE — PROGRESS NOTES
Transition of Care Plan:    RUR 8%     Disposition: Home    Discharge anticipated >48 hours     Transportation: Son or mother. Follow up: Referral sent to [facility] specialist for new PCP. --     Primary contact: Ruben Nicole [phone]    Treatment plan discussed in IDRs. Patients leg is still swollen and painful. Attending wants to continue treatment through weekend and assess Monday for potential discharge. Plan is hopme with PCP follow-up.      Darcy Tierney, ANGEL, [facility]-  Case Management [address] [place] [address]  C: [phone]

## 2022-10-14 NOTE — PROGRESS NOTES
Attempted to schedule hospital follow up PCP appointment. Unable to reach anyone, unable to leave voicemail. Pending patient discharge.  Parker Garcia, Care Management Assistant

## 2022-10-15 ENCOUNTER — APPOINTMENT (OUTPATIENT)
Dept: ULTRASOUND IMAGING | Age: 47
DRG: 951 | End: 2022-10-15
Attending: HOSPITALIST
Payer: MEDICAID

## 2022-10-15 LAB
ALBUMIN SERPL-MCNC: 2.9 G/DL (ref 3.5–5)
ALBUMIN/GLOB SERPL: 0.6 {RATIO} (ref 1.1–2.2)
ALP SERPL-CCNC: 65 U/L (ref 45–117)
ALT SERPL-CCNC: 22 U/L (ref 12–78)
ANION GAP SERPL CALC-SCNC: 3 MMOL/L (ref 5–15)
AST SERPL-CCNC: 9 U/L (ref 15–37)
BASOPHILS # BLD: 0 K/UL (ref 0–0.1)
BASOPHILS NFR BLD: 0 % (ref 0–1)
BILIRUB SERPL-MCNC: 0.6 MG/DL (ref 0.2–1)
BUN SERPL-MCNC: 11 MG/DL (ref 6–20)
BUN/CREAT SERPL: 11 (ref 12–20)
CALCIUM SERPL-MCNC: 9.3 MG/DL (ref 8.5–10.1)
CHLORIDE SERPL-SCNC: 99 MMOL/L (ref 97–108)
CO2 SERPL-SCNC: 30 MMOL/L (ref 21–32)
CREAT SERPL-MCNC: 1.03 MG/DL (ref 0.7–1.3)
DIFFERENTIAL METHOD BLD: ABNORMAL
EOSINOPHIL # BLD: 0.1 K/UL (ref 0–0.4)
EOSINOPHIL NFR BLD: 1 % (ref 0–7)
ERYTHROCYTE [DISTWIDTH] IN BLOOD BY AUTOMATED COUNT: 14.2 % (ref 11.5–14.5)
GLOBULIN SER CALC-MCNC: 4.9 G/DL (ref 2–4)
GLUCOSE SERPL-MCNC: 96 MG/DL (ref 65–100)
HCT VFR BLD AUTO: 41.7 % (ref 36.6–50.3)
HGB BLD-MCNC: 12.8 G/DL (ref 12.1–17)
IMM GRANULOCYTES # BLD AUTO: 0.1 K/UL (ref 0–0.04)
IMM GRANULOCYTES NFR BLD AUTO: 1 % (ref 0–0.5)
LYMPHOCYTES # BLD: 1.4 K/UL (ref 0.8–3.5)
LYMPHOCYTES NFR BLD: 10 % (ref 12–49)
MAGNESIUM SERPL-MCNC: 2.2 MG/DL (ref 1.6–2.4)
MCH RBC QN AUTO: 29 PG (ref 26–34)
MCHC RBC AUTO-ENTMCNC: 30.7 G/DL (ref 30–36.5)
MCV RBC AUTO: 94.3 FL (ref 80–99)
MONOCYTES # BLD: 1 K/UL (ref 0–1)
MONOCYTES NFR BLD: 7 % (ref 5–13)
NEUTS SEG # BLD: 12 K/UL (ref 1.8–8)
NEUTS SEG NFR BLD: 81 % (ref 32–75)
NRBC # BLD: 0.02 K/UL (ref 0–0.01)
NRBC BLD-RTO: 0.1 PER 100 WBC
PHOSPHATE SERPL-MCNC: 3.7 MG/DL (ref 2.6–4.7)
PLATELET # BLD AUTO: 290 K/UL (ref 150–400)
PMV BLD AUTO: 9.5 FL (ref 8.9–12.9)
POTASSIUM SERPL-SCNC: 4.1 MMOL/L (ref 3.5–5.1)
PROT SERPL-MCNC: 7.8 G/DL (ref 6.4–8.2)
RBC # BLD AUTO: 4.42 M/UL (ref 4.1–5.7)
SODIUM SERPL-SCNC: 132 MMOL/L (ref 136–145)
VANCOMYCIN SERPL-MCNC: 9.3 UG/ML
WBC # BLD AUTO: 14.6 K/UL (ref 4.1–11.1)

## 2022-10-15 PROCEDURE — 80202 ASSAY OF VANCOMYCIN: CPT

## 2022-10-15 PROCEDURE — 83735 ASSAY OF MAGNESIUM: CPT

## 2022-10-15 PROCEDURE — 84100 ASSAY OF PHOSPHORUS: CPT

## 2022-10-15 PROCEDURE — 76882 US LMTD JT/FCL EVL NVASC XTR: CPT

## 2022-10-15 PROCEDURE — 74011250637 HC RX REV CODE- 250/637: Performed by: HOSPITALIST

## 2022-10-15 PROCEDURE — 74011000250 HC RX REV CODE- 250: Performed by: FAMILY MEDICINE

## 2022-10-15 PROCEDURE — 74011250636 HC RX REV CODE- 250/636: Performed by: FAMILY MEDICINE

## 2022-10-15 PROCEDURE — 74011000258 HC RX REV CODE- 258: Performed by: HOSPITALIST

## 2022-10-15 PROCEDURE — 36415 COLL VENOUS BLD VENIPUNCTURE: CPT

## 2022-10-15 PROCEDURE — 65660000001 HC RM ICU INTERMED STEPDOWN

## 2022-10-15 PROCEDURE — 74011250636 HC RX REV CODE- 250/636: Performed by: HOSPITALIST

## 2022-10-15 PROCEDURE — 80053 COMPREHEN METABOLIC PANEL: CPT

## 2022-10-15 PROCEDURE — 85025 COMPLETE CBC W/AUTO DIFF WBC: CPT

## 2022-10-15 RX ADMIN — HYDROMORPHONE HYDROCHLORIDE 1 MG: 1 INJECTION, SOLUTION INTRAMUSCULAR; INTRAVENOUS; SUBCUTANEOUS at 21:13

## 2022-10-15 RX ADMIN — PIPERACILLIN AND TAZOBACTAM 3.38 G: 3; .375 INJECTION, POWDER, FOR SOLUTION INTRAVENOUS at 00:57

## 2022-10-15 RX ADMIN — OXYCODONE AND ACETAMINOPHEN 1 TABLET: 10; 325 TABLET ORAL at 23:58

## 2022-10-15 RX ADMIN — PIPERACILLIN AND TAZOBACTAM 3.38 G: 3; .375 INJECTION, POWDER, FOR SOLUTION INTRAVENOUS at 16:55

## 2022-10-15 RX ADMIN — OXYCODONE AND ACETAMINOPHEN 1 TABLET: 10; 325 TABLET ORAL at 14:55

## 2022-10-15 RX ADMIN — ENOXAPARIN SODIUM 30 MG: 100 INJECTION SUBCUTANEOUS at 09:01

## 2022-10-15 RX ADMIN — SODIUM CHLORIDE, PRESERVATIVE FREE 10 ML: 5 INJECTION INTRAVENOUS at 09:03

## 2022-10-15 RX ADMIN — SODIUM CHLORIDE, PRESERVATIVE FREE 10 ML: 5 INJECTION INTRAVENOUS at 21:06

## 2022-10-15 RX ADMIN — HYDROMORPHONE HYDROCHLORIDE 1 MG: 1 INJECTION, SOLUTION INTRAMUSCULAR; INTRAVENOUS; SUBCUTANEOUS at 07:12

## 2022-10-15 RX ADMIN — ENOXAPARIN SODIUM 30 MG: 100 INJECTION SUBCUTANEOUS at 21:05

## 2022-10-15 RX ADMIN — OXYCODONE AND ACETAMINOPHEN 1 TABLET: 10; 325 TABLET ORAL at 05:06

## 2022-10-15 RX ADMIN — PIPERACILLIN AND TAZOBACTAM 3.38 G: 3; .375 INJECTION, POWDER, FOR SOLUTION INTRAVENOUS at 23:58

## 2022-10-15 RX ADMIN — CARVEDILOL 25 MG: 12.5 TABLET, FILM COATED ORAL at 16:59

## 2022-10-15 RX ADMIN — HYDROMORPHONE HYDROCHLORIDE 1 MG: 1 INJECTION, SOLUTION INTRAMUSCULAR; INTRAVENOUS; SUBCUTANEOUS at 02:36

## 2022-10-15 RX ADMIN — FUROSEMIDE 40 MG: 10 INJECTION, SOLUTION INTRAVENOUS at 09:01

## 2022-10-15 RX ADMIN — HYDROMORPHONE HYDROCHLORIDE 1 MG: 1 INJECTION, SOLUTION INTRAMUSCULAR; INTRAVENOUS; SUBCUTANEOUS at 12:33

## 2022-10-15 RX ADMIN — HYDROMORPHONE HYDROCHLORIDE 1 MG: 1 INJECTION, SOLUTION INTRAMUSCULAR; INTRAVENOUS; SUBCUTANEOUS at 16:55

## 2022-10-15 RX ADMIN — SODIUM CHLORIDE, PRESERVATIVE FREE 10 ML: 5 INJECTION INTRAVENOUS at 16:55

## 2022-10-15 RX ADMIN — CARVEDILOL 25 MG: 12.5 TABLET, FILM COATED ORAL at 09:01

## 2022-10-15 RX ADMIN — VANCOMYCIN HYDROCHLORIDE 1250 MG: 1.25 INJECTION, POWDER, LYOPHILIZED, FOR SOLUTION INTRAVENOUS at 16:55

## 2022-10-15 RX ADMIN — OXYCODONE AND ACETAMINOPHEN 1 TABLET: 10; 325 TABLET ORAL at 19:54

## 2022-10-15 RX ADMIN — PIPERACILLIN AND TAZOBACTAM 3.38 G: 3; .375 INJECTION, POWDER, FOR SOLUTION INTRAVENOUS at 09:01

## 2022-10-15 RX ADMIN — VANCOMYCIN HYDROCHLORIDE 1250 MG: 1.25 INJECTION, POWDER, LYOPHILIZED, FOR SOLUTION INTRAVENOUS at 09:01

## 2022-10-15 NOTE — PROGRESS NOTES
[address] [facility] Adult  Hospitalist Group                                                                                          Hospitalist Progress Note  Patt Putnam MD  Answering service: [phone] from in house phone        Date of Service:  10/15/2022  NAME:  Adriano Kay  :  1975  MRN:  [id number]      Admission Summary:   Adriano Kay is a 56 y.o. male  with past medical history of obstructive sleep apnea, class III obesity, lymphedema presented to the emergency department via private transport from home with reported initial chief complaint of shortness of breath and leg swelling. on admission the patient is obtunded and not providing history. As such majority of history was obtained per review of ED and electronic medical records. Per ER triage note, patient arrived awake alert oriented x4. He reportedly had chronic leg edema and swelling over the past year. Leg swelling and shortness of breath notably worsened over the past prior 3 days. He initially went to [facility] urgent care center a few days ago and has been given antibiotics for unknown diagnosis. On arrival emergency department tonight, initial recorded vital signs temperature 97.7 °F, /95, heart rate 108, respirate 18, O2 saturation 95% on room air. Per chart records patient was placed on 2 L, followed by 5 L of O2 via nasal cannula and then a hundredths and nonrebreather. Venous blood gas showed pH 7.32, PCO2 55.4, PO2 66, HCO3 28.2, base excess 0.8, SaO2 90.2%. CTA chest with IV contrast was negative for PE and showed no acute process. 12 EKG shows sinus tachycardia, right bundle branch block, left anterior fascicular block 102 bpm.  ED requested admission to the hospitalist service.   However patient was requiring a hundredths and nonrebreather facemask and high risk of deterioration, I request the ED consult with ICU intensivist.  On arrival at the patient's bedside, patient was on CPAP with O2 saturation 99%. As such, orders were placed for admission to the hospitalist service. Interval history / Subjective:   Still Severe left leg pain and swelling   Wbc worse        Assessment & Plan:      Acute on chronic hypercarbic respiratory failure, respiratory acidosis is partially compensated  Untreated sleep apnea/obesity hypoventilation syndrome  Pulmonologist consulted  Continue Bipap HS and prn at nap time  Recommend that he return to the sleep clinic with Dr. Jenni Gee immediately after discharge to reestablish care  He will need compliance with recommended PAP therapy, tracheostomy is another option  Diuresis as long cr tolerate   Lasix 40mg iv daily now     Acute left leg cellulitis:  Stat leg CT no abscess, no acute compartment, check CPK   Continue iv vanco and zosyn for now  WBC worse   Still quite swelling/tender consult general surgeon for the concerning of compartment. dorsalis pulse palpable. No right foot numbness for now   General surgeon and ortho consulted : no surgery for now  Will order left leg US to see if any new abscess collection      Bilateral lower extremity edema/lymphedema   Prn diuresis   Keep legs elevated      Uncontrolled systemic hypertension    Acute metabolic encephalopathy/Altered mental status  -due to hypercapnia.   As such change settings to BiPAP and correct respiratory acidosis  -resolved now       Tachycardia   -continue telemetry monitoring     Class 3 obesity  -BMI 49.26 kg/m2  -would urge eventual weight loss, heart healthy diet, lifestyle modification       Code status: full   Prophylaxis: sc lovenox   Care Plan discussed with: ptjackie, rn,   Anticipated Disposition: TBD, home >48 hours      Hospital Problems  Date Reviewed: 2/22/2021            Codes Class Noted POA    Acute respiratory failure with hypoxia and hypercapnia (HCC) ICD-10-CM: J96.01, J96.02  ICD-9-CM: 518.81  10/13/2022 Unknown         Review of Systems:   A comprehensive review of systems was negative except for that written in the HPI. Vital Signs:    Last 24hrs VS reviewed since prior progress note. Most recent are:  Visit Vitals  BP (!) 138/92 (BP 1 Location: Right lower arm, BP Patient Position: Reclining)   Pulse 93   Temp 98.3 °F (36.8 °C)   Resp 15   Ht 5' 8\" (1.727 m)   Wt 150.6 kg (332 lb)   SpO2 97%   BMI 50.48 kg/m²         Intake/Output Summary (Last 24 hours) at 10/15/2022 1328  Last data filed at 10/15/2022 1120  Gross per 24 hour   Intake 1050 ml   Output 4150 ml   Net -3100 ml          Physical Examination:     I had a face to face encounter with this patient and independently examined them on 10/15/2022 as outlined below:          Constitutional:  No acute distress, cooperative, pleasant    ENT:  Oral mucosa moist, oropharynx benign. Resp:  CTA bilaterally. No wheezing/rhonchi/rales. No accessory muscle use. CV:  Regular rhythm, normal rate, no murmurs, gallops, rubs    GI:  Soft, non distended, non tender. normoactive bowel sounds, no hepatosplenomegaly     Musculoskeletal:  Left leg swelling, noticed a nodular induration at lower leg. Tender to touch. Pulses palpable     Neurologic:  Moves all extremities. AAOx3, CN II-XII reviewed            Data Review:    Review and/or order of clinical lab test      Labs:     Recent Labs     10/15/22  0509 10/14/22  0403   WBC 14.6* 14.1*   HGB 12.8 12.9   HCT 41.7 41.5    300       Recent Labs     10/15/22  0509 10/14/22  0403 10/13/22  0943   * 135* 137   K 4.1 4.1 4.3   CL 99 103 102   CO2 30 29 32   BUN 11 9 10   CREA 1.03 1.02 1.01   GLU 96 110* 95   CA 9.3 9.1 9.1   MG 2.2 2.3 2.1   PHOS 3.7  --   --        Recent Labs     10/15/22  0509 10/14/22  0403 10/13/22  0943   ALT 22 23 26   AP 65 64 66   TBILI 0.6 0.4 0.5   TP 7.8 7.8 7.1   ALB 2.9* 3.2* 3.4*   GLOB 4.9* 4.6* 3.7       Recent Labs     10/13/22  0943   INR 1.0   PTP 10.7   APTT 34.8*        No results for input(s): FE, TIBC, PSAT, FERR in the last 72 hours. No results found for: FOL, RBCF   No results for input(s): PH, PCO2, PO2 in the last 72 hours.   Recent Labs     10/13/22  0943          Lab Results   Component Value Date/Time    Cholesterol, total 147 05/07/2013 12:49 PM    HDL Cholesterol 37 (L) 05/07/2013 12:49 PM    LDL, calculated 98 05/07/2013 12:49 PM    Triglyceride 62 05/07/2013 12:49 PM     No results found for: [facility]  Lab Results   Component Value Date/Time    Color YELLOW/STRAW 07/01/2018 11:04 PM    Appearance CLEAR 07/01/2018 11:04 PM    Specific gravity 1.026 07/01/2018 11:04 PM    pH (UA) 5.5 07/01/2018 11:04 PM    Protein NEGATIVE 07/01/2018 11:04 PM    Glucose NEGATIVE 07/01/2018 11:04 PM    Ketone NEGATIVE 07/01/2018 11:04 PM    Bilirubin NEGATIVE 07/01/2018 11:04 PM    Urobilinogen 1.0 07/01/2018 11:04 PM    Nitrites NEGATIVE 07/01/2018 11:04 PM    Leukocyte Esterase NEGATIVE 07/01/2018 11:04 PM    Epithelial cells FEW 07/01/2018 11:04 PM    Bacteria NEGATIVE 07/01/2018 11:04 PM    WBC 0-4 07/01/2018 11:04 PM    RBC 0-5 07/01/2018 11:04 PM         Medications Reviewed:     Current Facility-Administered Medications   Medication Dose Route Frequency    vancomycin (VANCOCIN) 1,250 mg in 0.9% sodium chloride 250 mL ([id number])  1,250 mg IntraVENous Q8H    furosemide (LASIX) injection 40 mg  40 mg IntraVENous DAILY    HYDROmorphone (DILAUDID) injection 1 mg  1 mg IntraVENous Q4H PRN    carvediloL (COREG) tablet 25 mg  25 mg Oral BID WITH MEALS    sodium chloride (NS) flush 5-40 mL  5-40 mL IntraVENous Q8H    sodium chloride (NS) flush 5-40 mL  5-40 mL IntraVENous PRN    acetaminophen (TYLENOL) tablet 650 mg  650 mg Oral Q6H PRN    Or    acetaminophen (TYLENOL) suppository 650 mg  650 mg Rectal Q6H PRN    polyethylene glycol (MIRALAX) packet 17 g  17 g Oral DAILY PRN    ondansetron (ZOFRAN ODT) tablet 4 mg  4 mg Oral Q8H PRN    Or    ondansetron (ZOFRAN) injection 4 mg  4 mg IntraVENous Q6H PRN    enoxaparin (LOVENOX) injection 30 mg  30 mg SubCUTAneous Q12H    oxyCODONE-acetaminophen (PERCOCET 10)  mg per tablet 1 Tablet  1 Tablet Oral Q4H PRN    piperacillin-tazobactam (ZOSYN) 3.375 g in 0.9% sodium chloride (MBP/ADV) 100 mL MBP  3.375 g IntraVENous Q8H    vancomycin - pharmacy to dose   Other Rx Dosing/Monitoring    hydrALAZINE (APRESOLINE) 20 mg/mL injection 10 mg  10 mg IntraVENous Q6H PRN     ______________________________________________________________________  EXPECTED LENGTH OF STAY: 3d 14h  ACTUAL LENGTH OF STAY:          2                 Mike Madera MD

## 2022-10-15 NOTE — PROGRESS NOTES
Day #3 of Vancomycin - Level/Dosing Update  Indication:  Acute left leg cellulitis   Current regimen:  1250 mg IV Q12H   Abx regimen:  vancomycin + Zosyn   ID Following ?: NO  Concomitant nephrotoxic drugs: Contrast agents (10/13), Loop diuretics  Frequency of BMP?: Not ordered, last done: 10/15    Recent Labs     10/15/22  0509 10/14/22  0403 10/13/22  0943   WBC 14.6* 14.1* 11.0   CREA 1.03 1.02 1.01   BUN 11 9 10     Est CrCl: ? 100 ml/min; UO: 0.9 ml/kg/hr  Temp (24hrs), Av.8 °F (37.1 °C), Min:98 °F (36.7 °C), Max:100.3 °F (37.9 °C)    Cultures:   10/13 Blood - NGTD    MRSA Swab ordered (if applicable)? N/A    Goal target range AUC/RAFAEL 400-500    Recent level history:  Date/Time Dose & Interval Measured Level (mcg/mL) Associated AUC/RAFAEL Dose Adjustment    10/15 @ 0509 1250 mg IV q12h 9.3 mcg/mL (drawn ~7 hrs post-dose 288 Changed to 1250 mg IV q8h                                         Plan: The random vancomycin level drawn this morning correlates with an AUC/RAFAEL of 288, which is below the goal range. Plan will be to adjust the dose to 1250 mg IV  Q 8 hr for an estimated AUC/RAFAEL of 432. Pharmacy will continue to monitor patient daily and will make dosage adjustments based upon changing renal function. *Random vancomycin levels are used to calculate AUC/RAFAEL, this level should not be interpreted as a trough. Vancomycin has been dosed using Bayesian kinetics software to target an AUC24:RAFAEL of 400-600, which provides adequate exposure for as assumed infection due to MRSA with an RAFAEL of 1 or less while reducing the risk of nephrotoxicity as seen with traditional trough based dosing goals.

## 2022-10-15 NOTE — CONSULTS
Ortho Consult Note      Patient: Janice Castellanos                   MRN: [id number]  Sex: male  YOB: 1975           Age: 56 y.o. Consultant: Hunter Brewster PA-C    Reason for Consult: \"left leg cellulitis, concerning compartment\"     History of Present Illness: 52yo male with chronic intermittent lower extremity swelling. Admitted 10/12/22 for evaluation of worsening lower extremity swelling and hypoxia. He reports that he was started on oral ABX on 10/6/22 at [facility] for presumed cellulitis in the left lower leg. He did not improve. A venous doppler was performed 10/13/22 and he has no evidence of DVT. A CT of the left leg was also done 10/13 and was negative for fluid collection/abscess. WBC today was 14.6. Was seen by general surgery yesterday. No surgery recommended. Orthopedic surgery was consulted today for further evaluation. Visit Vitals  BP (!) 142/91 (BP 1 Location: Right upper arm, BP Patient Position: At rest)   Pulse 86   Temp 98.1 °F (36.7 °C)   Resp 18   Ht 5' 8\" (1.727 m)   Wt 150.6 kg (332 lb)   SpO2 97%   BMI 50.48 kg/m²        Lab Results:  HGB   Date/Time Value Ref Range Status   10/15/2022 05:09 AM 12.8 12.1 - 17.0 g/dL Final     INR   Date/Time Value Ref Range Status   10/13/2022 09:43 AM 1.0 0.9 - 1.1   Final     Comment:     A single therapeutic range for Vit K antagonists may not be optimal for all indications - see June, 2008 issue of Chest, American College of [employer] Evidence-Based Clinical Practice Guidelines, 8th Edition.        Physical Exam:    52yo male, NAD, pleasant/cooperative, alert/oriented, both lower extremities swollen L>R, darkening of skin lower legs bilaterally consistent with venous stasis, left calf TTP, Franklyn's negative, no pain with passive stretch, no paler, palpable pedal pulses bilaterally, compartments soft/compressible in both legs, moving feet/ankles normally, no paresthesias    Imaging Studies:    CT LOW EXT LT W CONT 10/13/2022 10:36     INDICATION: Left leg swelling, rule out abscess, compartment      COMPARISON: Left lower extremity venous duplex 7/15/2018     CONTRAST: 100 mL of Isovue-300. TECHNIQUE: Helical CT of the left lower extremity (from the distal femur through  the toes) during uneventful rapid bolus intravenous contrast administration. Coronal and Sagittal reformats were generated. Images reviewed in soft tissue  and bone windows. CT dose reduction was achieved through the use of a  standardized protocol tailored for this examination and automatic exposure  control for dose modulation. FINDINGS: Bones: Normal bone mineralization. No fracture, dislocation, or  periosteal reaction. Joint fluid: None. Articulations: Mild osteoarthritis involving the knee, ankle, and foot. Tendons: No full-thickness tendon tear. Muscles: No intramuscular hematoma. No focal atrophy. Soft tissues: Diffuse circumferential skin thickening and subcutaneous fat  stranding/edema and swelling in the lower leg, compatible with cellulitis. No  focal fluid collection to suggest abscess. 16 mm x 7 mm x 5 mm focus of nodular  cutaneous enhancement at the medial hindfoot (302-187, 304-208). IMPRESSION  Lower leg cellulitis. No abscess. Focal nodular cutaneous enhancing focus in the  medial hindfoot, correlate clinically. Plan:    Discussed with attending orthopedic surgeon, Dr. Laina Cabrera. No concern for compartment syndrome/abscess/septic joint/osteomyelitis. Appears to have cellulitis and acute exacerbation of chronic intermittent lower extremity edema. No indication for orthopedic surgical intervention. Recommend strict ice/elevation of left lower extremity for edema control. Defer to primary team for antibiotic regimen. Will sign off.      [address] [place], [place]  10/15/2022   10:26 AM      .

## 2022-10-16 LAB
ALBUMIN SERPL-MCNC: 2.9 G/DL (ref 3.5–5)
ALBUMIN/GLOB SERPL: 0.6 {RATIO} (ref 1.1–2.2)
ALP SERPL-CCNC: 62 U/L (ref 45–117)
ALT SERPL-CCNC: 22 U/L (ref 12–78)
ANION GAP SERPL CALC-SCNC: 4 MMOL/L (ref 5–15)
AST SERPL-CCNC: 14 U/L (ref 15–37)
BASOPHILS # BLD: 0 K/UL (ref 0–0.1)
BASOPHILS NFR BLD: 0 % (ref 0–1)
BILIRUB SERPL-MCNC: 0.5 MG/DL (ref 0.2–1)
BUN SERPL-MCNC: 12 MG/DL (ref 6–20)
BUN/CREAT SERPL: 11 (ref 12–20)
CALCIUM SERPL-MCNC: 9.4 MG/DL (ref 8.5–10.1)
CHLORIDE SERPL-SCNC: 99 MMOL/L (ref 97–108)
CO2 SERPL-SCNC: 30 MMOL/L (ref 21–32)
CREAT SERPL-MCNC: 1.1 MG/DL (ref 0.7–1.3)
DIFFERENTIAL METHOD BLD: ABNORMAL
EOSINOPHIL # BLD: 0.1 K/UL (ref 0–0.4)
EOSINOPHIL NFR BLD: 1 % (ref 0–7)
ERYTHROCYTE [DISTWIDTH] IN BLOOD BY AUTOMATED COUNT: 14 % (ref 11.5–14.5)
GLOBULIN SER CALC-MCNC: 4.9 G/DL (ref 2–4)
GLUCOSE SERPL-MCNC: 92 MG/DL (ref 65–100)
HCT VFR BLD AUTO: 41.5 % (ref 36.6–50.3)
HGB BLD-MCNC: 12.9 G/DL (ref 12.1–17)
IMM GRANULOCYTES # BLD AUTO: 0.1 K/UL (ref 0–0.04)
IMM GRANULOCYTES NFR BLD AUTO: 1 % (ref 0–0.5)
LYMPHOCYTES # BLD: 1.1 K/UL (ref 0.8–3.5)
LYMPHOCYTES NFR BLD: 9 % (ref 12–49)
MAGNESIUM SERPL-MCNC: 2.4 MG/DL (ref 1.6–2.4)
MCH RBC QN AUTO: 29.3 PG (ref 26–34)
MCHC RBC AUTO-ENTMCNC: 31.1 G/DL (ref 30–36.5)
MCV RBC AUTO: 94.3 FL (ref 80–99)
MONOCYTES # BLD: 1 K/UL (ref 0–1)
MONOCYTES NFR BLD: 8 % (ref 5–13)
NEUTS SEG # BLD: 10 K/UL (ref 1.8–8)
NEUTS SEG NFR BLD: 81 % (ref 32–75)
NRBC # BLD: 0 K/UL (ref 0–0.01)
NRBC BLD-RTO: 0 PER 100 WBC
PHOSPHATE SERPL-MCNC: 3.8 MG/DL (ref 2.6–4.7)
PLATELET # BLD AUTO: 316 K/UL (ref 150–400)
PMV BLD AUTO: 9.3 FL (ref 8.9–12.9)
POTASSIUM SERPL-SCNC: 4.1 MMOL/L (ref 3.5–5.1)
PROT SERPL-MCNC: 7.8 G/DL (ref 6.4–8.2)
RBC # BLD AUTO: 4.4 M/UL (ref 4.1–5.7)
SODIUM SERPL-SCNC: 133 MMOL/L (ref 136–145)
WBC # BLD AUTO: 12.2 K/UL (ref 4.1–11.1)

## 2022-10-16 PROCEDURE — 74011000250 HC RX REV CODE- 250: Performed by: FAMILY MEDICINE

## 2022-10-16 PROCEDURE — 74011250637 HC RX REV CODE- 250/637: Performed by: INTERNAL MEDICINE

## 2022-10-16 PROCEDURE — 85025 COMPLETE CBC W/AUTO DIFF WBC: CPT

## 2022-10-16 PROCEDURE — 94660 CPAP INITIATION&MGMT: CPT

## 2022-10-16 PROCEDURE — 36415 COLL VENOUS BLD VENIPUNCTURE: CPT

## 2022-10-16 PROCEDURE — 74011000258 HC RX REV CODE- 258: Performed by: HOSPITALIST

## 2022-10-16 PROCEDURE — 74011250636 HC RX REV CODE- 250/636: Performed by: FAMILY MEDICINE

## 2022-10-16 PROCEDURE — 74011250637 HC RX REV CODE- 250/637: Performed by: HOSPITALIST

## 2022-10-16 PROCEDURE — 84100 ASSAY OF PHOSPHORUS: CPT

## 2022-10-16 PROCEDURE — 83735 ASSAY OF MAGNESIUM: CPT

## 2022-10-16 PROCEDURE — 99232 SBSQ HOSP IP/OBS MODERATE 35: CPT | Performed by: SURGERY

## 2022-10-16 PROCEDURE — 74011250636 HC RX REV CODE- 250/636: Performed by: HOSPITALIST

## 2022-10-16 PROCEDURE — 80053 COMPREHEN METABOLIC PANEL: CPT

## 2022-10-16 PROCEDURE — 65660000001 HC RM ICU INTERMED STEPDOWN

## 2022-10-16 RX ORDER — POLYETHYLENE GLYCOL 3350 17 G/17G
17 POWDER, FOR SOLUTION ORAL DAILY
Status: DISCONTINUED | OUTPATIENT
Start: 2022-10-16 | End: 2022-10-21 | Stop reason: HOSPADM

## 2022-10-16 RX ORDER — LORAZEPAM 1 MG/1
1 TABLET ORAL ONCE
Status: COMPLETED | OUTPATIENT
Start: 2022-10-16 | End: 2022-10-16

## 2022-10-16 RX ADMIN — VANCOMYCIN HYDROCHLORIDE 1250 MG: 1.25 INJECTION, POWDER, LYOPHILIZED, FOR SOLUTION INTRAVENOUS at 09:28

## 2022-10-16 RX ADMIN — VANCOMYCIN HYDROCHLORIDE 1250 MG: 1.25 INJECTION, POWDER, LYOPHILIZED, FOR SOLUTION INTRAVENOUS at 00:12

## 2022-10-16 RX ADMIN — CARVEDILOL 25 MG: 12.5 TABLET, FILM COATED ORAL at 16:30

## 2022-10-16 RX ADMIN — HYDROMORPHONE HYDROCHLORIDE 1 MG: 1 INJECTION, SOLUTION INTRAMUSCULAR; INTRAVENOUS; SUBCUTANEOUS at 06:21

## 2022-10-16 RX ADMIN — HYDROMORPHONE HYDROCHLORIDE 1 MG: 1 INJECTION, SOLUTION INTRAMUSCULAR; INTRAVENOUS; SUBCUTANEOUS at 14:40

## 2022-10-16 RX ADMIN — SODIUM CHLORIDE, PRESERVATIVE FREE 10 ML: 5 INJECTION INTRAVENOUS at 21:15

## 2022-10-16 RX ADMIN — FUROSEMIDE 40 MG: 10 INJECTION, SOLUTION INTRAVENOUS at 09:28

## 2022-10-16 RX ADMIN — HYDROMORPHONE HYDROCHLORIDE 1 MG: 1 INJECTION, SOLUTION INTRAMUSCULAR; INTRAVENOUS; SUBCUTANEOUS at 09:27

## 2022-10-16 RX ADMIN — ENOXAPARIN SODIUM 30 MG: 100 INJECTION SUBCUTANEOUS at 21:14

## 2022-10-16 RX ADMIN — OXYCODONE AND ACETAMINOPHEN 1 TABLET: 10; 325 TABLET ORAL at 16:30

## 2022-10-16 RX ADMIN — PIPERACILLIN AND TAZOBACTAM 3.38 G: 3; .375 INJECTION, POWDER, FOR SOLUTION INTRAVENOUS at 09:28

## 2022-10-16 RX ADMIN — OXYCODONE AND ACETAMINOPHEN 1 TABLET: 10; 325 TABLET ORAL at 04:15

## 2022-10-16 RX ADMIN — OXYCODONE AND ACETAMINOPHEN 1 TABLET: 10; 325 TABLET ORAL at 11:54

## 2022-10-16 RX ADMIN — LORAZEPAM 1 MG: 1 TABLET ORAL at 00:39

## 2022-10-16 RX ADMIN — ENOXAPARIN SODIUM 30 MG: 100 INJECTION SUBCUTANEOUS at 09:29

## 2022-10-16 RX ADMIN — SODIUM CHLORIDE, PRESERVATIVE FREE 10 ML: 5 INJECTION INTRAVENOUS at 06:21

## 2022-10-16 RX ADMIN — HYDROMORPHONE HYDROCHLORIDE 1 MG: 1 INJECTION, SOLUTION INTRAMUSCULAR; INTRAVENOUS; SUBCUTANEOUS at 18:45

## 2022-10-16 RX ADMIN — CARVEDILOL 25 MG: 12.5 TABLET, FILM COATED ORAL at 09:28

## 2022-10-16 RX ADMIN — HYDROMORPHONE HYDROCHLORIDE 1 MG: 1 INJECTION, SOLUTION INTRAMUSCULAR; INTRAVENOUS; SUBCUTANEOUS at 01:15

## 2022-10-16 RX ADMIN — PIPERACILLIN AND TAZOBACTAM 3.38 G: 3; .375 INJECTION, POWDER, FOR SOLUTION INTRAVENOUS at 14:42

## 2022-10-16 RX ADMIN — SODIUM CHLORIDE, PRESERVATIVE FREE 10 ML: 5 INJECTION INTRAVENOUS at 14:43

## 2022-10-16 RX ADMIN — VANCOMYCIN HYDROCHLORIDE 1250 MG: 1.25 INJECTION, POWDER, LYOPHILIZED, FOR SOLUTION INTRAVENOUS at 16:32

## 2022-10-16 RX ADMIN — OXYCODONE AND ACETAMINOPHEN 1 TABLET: 10; 325 TABLET ORAL at 21:14

## 2022-10-16 NOTE — PROGRESS NOTES
[address] [facility] Adult  Hospitalist Group                                                                                          Hospitalist Progress Note  Hong Kirk MD  Answering service: [phone] from in house phone        Date of Service:  10/16/2022  NAME:  Rosana Polanco  :  1975  MRN:  [id number]      Admission Summary:   Rosana Polanco is a 56 y.o. male  with past medical history of obstructive sleep apnea, class III obesity, lymphedema presented to the emergency department via private transport from home with reported initial chief complaint of shortness of breath and leg swelling. on admission the patient is obtunded and not providing history. As such majority of history was obtained per review of ED and electronic medical records. Per ER triage note, patient arrived awake alert oriented x4. He reportedly had chronic leg edema and swelling over the past year. Leg swelling and shortness of breath notably worsened over the past prior 3 days. He initially went to [facility] urgent care center a few days ago and has been given antibiotics for unknown diagnosis. On arrival emergency department tonight, initial recorded vital signs temperature 97.7 °F, /95, heart rate 108, respirate 18, O2 saturation 95% on room air. Per chart records patient was placed on 2 L, followed by 5 L of O2 via nasal cannula and then a hundredths and nonrebreather. Venous blood gas showed pH 7.32, PCO2 55.4, PO2 66, HCO3 28.2, base excess 0.8, SaO2 90.2%. CTA chest with IV contrast was negative for PE and showed no acute process. 12 EKG shows sinus tachycardia, right bundle branch block, left anterior fascicular block 102 bpm.  ED requested admission to the hospitalist service.   However patient was requiring a hundredths and nonrebreather facemask and high risk of deterioration, I request the ED consult with ICU intensivist.  On arrival at the patient's bedside, patient was on CPAP with O2 saturation 99%. As such, orders were placed for admission to the hospitalist service. Interval history / Subjective:   Still Severe left leg pain and swelling   Wbc worse        Assessment & Plan:      Acute on chronic hypercarbic respiratory failure, respiratory acidosis is partially compensated  Untreated sleep apnea/obesity hypoventilation syndrome  Pulmonologist consulted  Continue Bipap HS and prn at nap time  Recommend that he return to the sleep clinic with Dr. Colleen Salvador immediately after discharge to reestablish care  He will need compliance with recommended PAP therapy, tracheostomy is another option  Diuresis as long cr tolerate   Lasix 40mg iv daily now     Acute left leg cellulitis:  Stat leg CT no abscess, no acute compartment, check CPK   Continue iv vanco and zosyn for now  WBC worse   Still quite swelling/tender consult general surgeon for the concerning of compartment. dorsalis pulse palpable. No right foot numbness for now   General surgeon and ortho consulted : no surgery for now  ordered left leg US to see if any new abscess collection :3.3 cm x 2.8 cm x 1.5 cm and 2.9 cm x 2.9 cm x 1.6 cm complex fluid collections  with extensive internal echogenic debris and peripheral vascularity in the  subcutaneous soft tissue  Pt will need I&D: this was discussed with general surgeon DR. Sexton today      Bilateral lower extremity edema/lymphedema   Prn diuresis   Keep legs elevated      Uncontrolled systemic hypertension    Acute metabolic encephalopathy/Altered mental status  -due to hypercapnia.   As such change settings to BiPAP and correct respiratory acidosis  -resolved now       Tachycardia   -continue telemetry monitoring     Class 3 obesity  -BMI 49.26 kg/m2  -would urge eventual weight loss, heart healthy diet, lifestyle modification       Code status: full   Prophylaxis: sc lovenox   Care Plan discussed with: pt, cm, rn,   Anticipated Disposition: TBD, home >48 hours      Hospital Problems Date Reviewed: 2/22/2021            Codes Class Noted POA    Acute respiratory failure with hypoxia and hypercapnia (HCC) ICD-10-CM: J96.01, J96.02  ICD-9-CM: 518.81  10/13/2022 Unknown         Review of Systems:   A comprehensive review of systems was negative except for that written in the HPI. Vital Signs:    Last 24hrs VS reviewed since prior progress note. Most recent are:  Visit Vitals  /67   Pulse 94   Temp 98.9 °F (37.2 °C)   Resp 18   Ht 5' 8\" (1.727 m)   Wt 150.6 kg (332 lb)   SpO2 99%   BMI 50.48 kg/m²         Intake/Output Summary (Last 24 hours) at 10/16/2022 1328  Last data filed at 10/16/2022 0406  Gross per 24 hour   Intake --   Output 1300 ml   Net -1300 ml          Physical Examination:     I had a face to face encounter with this patient and independently examined them on 10/16/2022 as outlined below:          Constitutional:  No acute distress, cooperative, pleasant    ENT:  Oral mucosa moist, oropharynx benign. Resp:  CTA bilaterally. No wheezing/rhonchi/rales. No accessory muscle use. CV:  Regular rhythm, normal rate, no murmurs, gallops, rubs    GI:  Soft, non distended, non tender. normoactive bowel sounds, no hepatosplenomegaly     Musculoskeletal:  Left leg swelling, noticed a nodular induration at lower leg. Tender to touch. Pulses palpable     Neurologic:  Moves all extremities.   AAOx3, CN II-XII reviewed            Data Review:    Review and/or order of clinical lab test      Labs:     Recent Labs     10/16/22  0405 10/15/22  0509   WBC 12.2* 14.6*   HGB 12.9 12.8   HCT 41.5 41.7    290       Recent Labs     10/16/22  0405 10/15/22  0509 10/14/22  0403   * 132* 135*   K 4.1 4.1 4.1   CL 99 99 103   CO2 30 30 29   BUN 12 11 9   CREA 1.10 1.03 1.02   GLU 92 96 110*   CA 9.4 9.3 9.1   MG 2.4 2.2 2.3   PHOS 3.8 3.7  --        Recent Labs     10/16/22  0405 10/15/22  0509 10/14/22  0403   ALT 22 22 23   AP 62 65 64   TBILI 0.5 0.6 0.4   TP 7.8 7.8 7.8   ALB 2.9* 2. 9* 3.2*   GLOB 4.9* 4.9* 4.6*       No results for input(s): INR, PTP, APTT, INREXT, INREXT in the last 72 hours. No results for input(s): FE, TIBC, PSAT, FERR in the last 72 hours. No results found for: FOL, RBCF   No results for input(s): PH, PCO2, PO2 in the last 72 hours. No results for input(s): CPK, CKNDX, TROIQ in the last 72 hours. No lab exists for component: CPKMB    Lab Results   Component Value Date/Time    Cholesterol, total 147 05/07/2013 12:49 PM    HDL Cholesterol 37 (L) 05/07/2013 12:49 PM    LDL, calculated 98 05/07/2013 12:49 PM    Triglyceride 62 05/07/2013 12:49 PM     No results found for: Yuly Ortiz  Lab Results   Component Value Date/Time    Color YELLOW/STRAW 07/01/2018 11:04 PM    Appearance CLEAR 07/01/2018 11:04 PM    Specific gravity 1.026 07/01/2018 11:04 PM    pH (UA) 5.5 07/01/2018 11:04 PM    Protein NEGATIVE 07/01/2018 11:04 PM    Glucose NEGATIVE 07/01/2018 11:04 PM    Ketone NEGATIVE 07/01/2018 11:04 PM    Bilirubin NEGATIVE 07/01/2018 11:04 PM    Urobilinogen 1.0 07/01/2018 11:04 PM    Nitrites NEGATIVE 07/01/2018 11:04 PM    Leukocyte Esterase NEGATIVE 07/01/2018 11:04 PM    Epithelial cells FEW 07/01/2018 11:04 PM    Bacteria NEGATIVE 07/01/2018 11:04 PM    WBC 0-4 07/01/2018 11:04 PM    RBC 0-5 07/01/2018 11:04 PM         Medications Reviewed:     Current Facility-Administered Medications   Medication Dose Route Frequency    [START ON 10/17/2022] Vancomycin random level - due 10/17 @ 0600. Thanks!    Other ONCE    vancomycin (VANCOCIN) 1,250 mg in 0.9% sodium chloride 250 mL ([id number])  1,250 mg IntraVENous Q8H    furosemide (LASIX) injection 40 mg  40 mg IntraVENous DAILY    HYDROmorphone (DILAUDID) injection 1 mg  1 mg IntraVENous Q4H PRN    carvediloL (COREG) tablet 25 mg  25 mg Oral BID WITH MEALS    sodium chloride (NS) flush 5-40 mL  5-40 mL IntraVENous Q8H    sodium chloride (NS) flush 5-40 mL  5-40 mL IntraVENous PRN    acetaminophen (TYLENOL) tablet 650 mg 650 mg Oral Q6H PRN    Or    acetaminophen (TYLENOL) suppository 650 mg  650 mg Rectal Q6H PRN    polyethylene glycol (MIRALAX) packet 17 g  17 g Oral DAILY PRN    ondansetron (ZOFRAN ODT) tablet 4 mg  4 mg Oral Q8H PRN    Or    ondansetron (ZOFRAN) injection 4 mg  4 mg IntraVENous Q6H PRN    enoxaparin (LOVENOX) injection 30 mg  30 mg SubCUTAneous Q12H    oxyCODONE-acetaminophen (PERCOCET 10)  mg per tablet 1 Tablet  1 Tablet Oral Q4H PRN    piperacillin-tazobactam (ZOSYN) 3.375 g in 0.9% sodium chloride (MBP/ADV) 100 mL MBP  3.375 g IntraVENous Q8H    vancomycin - pharmacy to dose   Other Rx Dosing/Monitoring    hydrALAZINE (APRESOLINE) 20 mg/mL injection 10 mg  10 mg IntraVENous Q6H PRN     ______________________________________________________________________  EXPECTED LENGTH OF STAY: 3d 14h  ACTUAL LENGTH OF STAY:          3                 Akira Vitale MD

## 2022-10-16 NOTE — PROGRESS NOTES
Bedside and Verbal shift change report given to Bradford Armenta RN (oncoming nurse) by Khadar Brewer LPN (offgoing nurse). Report included the following information SBAR, Kardex, ED Summary, Procedure Summary, MAR, and Recent Results.

## 2022-10-16 NOTE — PROGRESS NOTES
Pt on and off NIVPP over night , even with mask in place pt is still having trouble with sleep apnea .

## 2022-10-16 NOTE — PROGRESS NOTES
Bedside shift change report given to Jermain Giles RN (oncoming nurse) by Kevin Biswas RN (offgoing nurse). Report included the following information SBAR, Kardex, MAR, Recent Results, and Cardiac Rhythm NSR .

## 2022-10-16 NOTE — PROGRESS NOTES
Problem: Falls - Risk of  Goal: *Absence of Falls  Description: Document Andrew Blight Fall Risk and appropriate interventions in the flowsheet.   Outcome: Progressing Towards Goal  Note: Fall Risk Interventions:  Mobility Interventions: Communicate number of staff needed for ambulation/transfer, Bed/chair exit alarm         Medication Interventions: Evaluate medications/consider consulting pharmacy, Patient to call before getting OOB, Teach patient to arise slowly         History of Falls Interventions: Room close to nurse's station         Problem: Patient Education: Go to Patient Education Activity  Goal: Patient/Family Education  Outcome: Progressing Towards Goal

## 2022-10-17 ENCOUNTER — APPOINTMENT (OUTPATIENT)
Dept: GENERAL RADIOLOGY | Age: 47
DRG: 951 | End: 2022-10-17
Attending: PHYSICIAN ASSISTANT
Payer: MEDICAID

## 2022-10-17 LAB
ANION GAP SERPL CALC-SCNC: ABNORMAL MMOL/L (ref 5–15)
BUN SERPL-MCNC: 13 MG/DL (ref 6–20)
BUN/CREAT SERPL: 10 (ref 12–20)
CALCIUM SERPL-MCNC: 9.3 MG/DL (ref 8.5–10.1)
CHLORIDE SERPL-SCNC: 96 MMOL/L (ref 97–108)
CO2 SERPL-SCNC: 36 MMOL/L (ref 21–32)
CREAT SERPL-MCNC: 1.28 MG/DL (ref 0.7–1.3)
GLUCOSE SERPL-MCNC: 99 MG/DL (ref 65–100)
POTASSIUM SERPL-SCNC: 4.1 MMOL/L (ref 3.5–5.1)
SODIUM SERPL-SCNC: 131 MMOL/L (ref 136–145)
VANCOMYCIN SERPL-MCNC: 18 UG/ML

## 2022-10-17 PROCEDURE — 80048 BASIC METABOLIC PNL TOTAL CA: CPT

## 2022-10-17 PROCEDURE — 71045 X-RAY EXAM CHEST 1 VIEW: CPT

## 2022-10-17 PROCEDURE — 74011250637 HC RX REV CODE- 250/637: Performed by: INTERNAL MEDICINE

## 2022-10-17 PROCEDURE — 99233 SBSQ HOSP IP/OBS HIGH 50: CPT | Performed by: NURSE PRACTITIONER

## 2022-10-17 PROCEDURE — 74011000250 HC RX REV CODE- 250: Performed by: FAMILY MEDICINE

## 2022-10-17 PROCEDURE — 74011250637 HC RX REV CODE- 250/637: Performed by: HOSPITALIST

## 2022-10-17 PROCEDURE — 94762 N-INVAS EAR/PLS OXIMTRY CONT: CPT

## 2022-10-17 PROCEDURE — 77030020847 HC STATLOK BARD -A

## 2022-10-17 PROCEDURE — 74011000250 HC RX REV CODE- 250: Performed by: SURGERY

## 2022-10-17 PROCEDURE — 77010033678 HC OXYGEN DAILY

## 2022-10-17 PROCEDURE — 65660000001 HC RM ICU INTERMED STEPDOWN

## 2022-10-17 PROCEDURE — 74011250636 HC RX REV CODE- 250/636: Performed by: HOSPITALIST

## 2022-10-17 PROCEDURE — 10061 I&D ABSCESS COMP/MULTIPLE: CPT | Performed by: NURSE PRACTITIONER

## 2022-10-17 PROCEDURE — 74011000258 HC RX REV CODE- 258: Performed by: HOSPITALIST

## 2022-10-17 PROCEDURE — 36415 COLL VENOUS BLD VENIPUNCTURE: CPT

## 2022-10-17 PROCEDURE — 76937 US GUIDE VASCULAR ACCESS: CPT

## 2022-10-17 PROCEDURE — 80202 ASSAY OF VANCOMYCIN: CPT

## 2022-10-17 RX ORDER — LORAZEPAM 1 MG/1
1 TABLET ORAL ONCE
Status: COMPLETED | OUTPATIENT
Start: 2022-10-18 | End: 2022-10-18

## 2022-10-17 RX ORDER — LIDOCAINE HYDROCHLORIDE AND EPINEPHRINE 10; 10 MG/ML; UG/ML
10 INJECTION, SOLUTION INFILTRATION; PERINEURAL
Status: DISCONTINUED | OUTPATIENT
Start: 2022-10-17 | End: 2022-10-17 | Stop reason: SDUPTHER

## 2022-10-17 RX ORDER — LORAZEPAM 1 MG/1
1 TABLET ORAL ONCE
Status: COMPLETED | OUTPATIENT
Start: 2022-10-17 | End: 2022-10-17

## 2022-10-17 RX ORDER — LIDOCAINE HYDROCHLORIDE AND EPINEPHRINE 20; 10 MG/ML; UG/ML
10 INJECTION, SOLUTION INFILTRATION; PERINEURAL ONCE
Status: COMPLETED | OUTPATIENT
Start: 2022-10-17 | End: 2022-10-17

## 2022-10-17 RX ADMIN — SODIUM CHLORIDE, PRESERVATIVE FREE 10 ML: 5 INJECTION INTRAVENOUS at 06:11

## 2022-10-17 RX ADMIN — VANCOMYCIN HYDROCHLORIDE 1250 MG: 1.25 INJECTION, POWDER, LYOPHILIZED, FOR SOLUTION INTRAVENOUS at 00:20

## 2022-10-17 RX ADMIN — PIPERACILLIN AND TAZOBACTAM 3.38 G: 3; .375 INJECTION, POWDER, FOR SOLUTION INTRAVENOUS at 10:36

## 2022-10-17 RX ADMIN — OXYCODONE AND ACETAMINOPHEN 1 TABLET: 10; 325 TABLET ORAL at 20:49

## 2022-10-17 RX ADMIN — HYDROMORPHONE HYDROCHLORIDE 1 MG: 1 INJECTION, SOLUTION INTRAMUSCULAR; INTRAVENOUS; SUBCUTANEOUS at 23:06

## 2022-10-17 RX ADMIN — PIPERACILLIN AND TAZOBACTAM 3.38 G: 3; .375 INJECTION, POWDER, FOR SOLUTION INTRAVENOUS at 00:20

## 2022-10-17 RX ADMIN — OXYCODONE AND ACETAMINOPHEN 1 TABLET: 10; 325 TABLET ORAL at 04:44

## 2022-10-17 RX ADMIN — CARVEDILOL 25 MG: 12.5 TABLET, FILM COATED ORAL at 17:14

## 2022-10-17 RX ADMIN — CARVEDILOL 25 MG: 12.5 TABLET, FILM COATED ORAL at 08:50

## 2022-10-17 RX ADMIN — LORAZEPAM 1 MG: 1 TABLET ORAL at 00:22

## 2022-10-17 RX ADMIN — HYDROMORPHONE HYDROCHLORIDE 1 MG: 1 INJECTION, SOLUTION INTRAMUSCULAR; INTRAVENOUS; SUBCUTANEOUS at 17:04

## 2022-10-17 RX ADMIN — HYDROMORPHONE HYDROCHLORIDE 1 MG: 1 INJECTION, SOLUTION INTRAMUSCULAR; INTRAVENOUS; SUBCUTANEOUS at 09:59

## 2022-10-17 RX ADMIN — HYDROMORPHONE HYDROCHLORIDE 1 MG: 1 INJECTION, SOLUTION INTRAMUSCULAR; INTRAVENOUS; SUBCUTANEOUS at 00:20

## 2022-10-17 RX ADMIN — OXYCODONE AND ACETAMINOPHEN 1 TABLET: 10; 325 TABLET ORAL at 09:02

## 2022-10-17 RX ADMIN — PIPERACILLIN AND TAZOBACTAM 3.38 G: 3; .375 INJECTION, POWDER, FOR SOLUTION INTRAVENOUS at 23:06

## 2022-10-17 RX ADMIN — FUROSEMIDE 40 MG: 10 INJECTION, SOLUTION INTRAVENOUS at 08:50

## 2022-10-17 RX ADMIN — VANCOMYCIN HYDROCHLORIDE 1250 MG: 1.25 INJECTION, POWDER, LYOPHILIZED, FOR SOLUTION INTRAVENOUS at 17:15

## 2022-10-17 RX ADMIN — OXYCODONE AND ACETAMINOPHEN 1 TABLET: 10; 325 TABLET ORAL at 12:42

## 2022-10-17 RX ADMIN — SODIUM CHLORIDE, PRESERVATIVE FREE 10 ML: 5 INJECTION INTRAVENOUS at 21:42

## 2022-10-17 RX ADMIN — PIPERACILLIN AND TAZOBACTAM 3.38 G: 3; .375 INJECTION, POWDER, FOR SOLUTION INTRAVENOUS at 17:15

## 2022-10-17 RX ADMIN — VANCOMYCIN HYDROCHLORIDE 1250 MG: 1.25 INJECTION, POWDER, LYOPHILIZED, FOR SOLUTION INTRAVENOUS at 08:50

## 2022-10-17 RX ADMIN — LIDOCAINE HYDROCHLORIDE AND EPINEPHRINE 200 MG: 20; 10 INJECTION, SOLUTION INFILTRATION; PERINEURAL at 10:00

## 2022-10-17 NOTE — PROGRESS NOTES
General Surgery Daily Progress Note    Admit Date: 10/12/2022  Post-Operative Day: * No surgery found * from * No surgery found *     Subjective:     Last 24 hrs: Pt is doing well; he does have some LE pain and one abscess is draining       Objective:     Blood pressure 139/74, pulse 93, temperature 99 °F (37.2 °C), resp. rate 21, height 5' 8\" (1.727 m), weight 332 lb (150.6 kg), SpO2 92 %. Temp (24hrs), Av °F (37.2 °C), Min:98.8 °F (37.1 °C), Max:99.1 °F (37.3 °C)      _____________________  Physical Exam:     Alert and Oriented, x3, in no acute distress. Cardiovascular: RRR, no peripheral edema  Pt has 3 abscesses on L leg; lateral one is draining bloody drainage      Assessment:   Active Problems:    Acute respiratory failure with hypoxia and hypercapnia (HCC) (10/13/2022)            Plan:     See below    Data Review:    Recent Labs     10/16/22  0405 10/15/22  0509   WBC 12.2* 14.6*   HGB 12.9 12.8   HCT 41.5 41.7    290     Recent Labs     10/17/22  0453 10/16/22  0405 10/15/22  0509   * 133* 132*   K 4.1 4.1 4.1   CL 96* 99 99   CO2 36* 30 30   GLU 99 92 96   BUN 13 12 11   CREA 1.28 1.10 1.03   CA 9.3 9.4 9.3   MG  --  2.4 2.2   PHOS  --  3.8 3.7   ALB  --  2.9* 2.9*   ALT  --   22     No results for input(s): AML, LPSE in the last 72 hours.         ______________________  Medications:    Current Facility-Administered Medications   Medication Dose Route Frequency    lidocaine-EPINEPHrine (XYLOCAINE) 2 %-1:100,000 injection 200 mg  10 mL IntraDERMal ONCE    polyethylene glycol (MIRALAX) packet 17 g  17 g Oral DAILY    vancomycin (VANCOCIN) 1,250 mg in 0.9% sodium chloride 250 mL ([id number])  1,250 mg IntraVENous Q8H    furosemide (LASIX) injection 40 mg  40 mg IntraVENous DAILY    HYDROmorphone (DILAUDID) injection 1 mg  1 mg IntraVENous Q4H PRN    carvediloL (COREG) tablet 25 mg  25 mg Oral BID WITH MEALS    sodium chloride (NS) flush 5-40 mL  5-40 mL IntraVENous Q8H    sodium chloride (NS) flush 5-40 mL  5-40 mL IntraVENous PRN    acetaminophen (TYLENOL) tablet 650 mg  650 mg Oral Q6H PRN    Or    acetaminophen (TYLENOL) suppository 650 mg  650 mg Rectal Q6H PRN    ondansetron (ZOFRAN ODT) tablet 4 mg  4 mg Oral Q8H PRN    Or    ondansetron (ZOFRAN) injection 4 mg  4 mg IntraVENous Q6H PRN    [Held by provider] enoxaparin (LOVENOX) injection 30 mg  30 mg SubCUTAneous Q12H    oxyCODONE-acetaminophen (PERCOCET 10)  mg per tablet 1 Tablet  1 Tablet Oral Q4H PRN    piperacillin-tazobactam (ZOSYN) 3.375 g in 0.9% sodium chloride (MBP/ADV) 100 mL MBP  3.375 g IntraVENous Q8H    vancomycin - pharmacy to dose   Other Rx Dosing/Monitoring    hydrALAZINE (APRESOLINE) 20 mg/mL injection 10 mg  10 mg IntraVENous Q6H PRN       Jenniffer Meadows NP  10/17/2022        The procedure was explained to the patient in detail, including but not limited to the risks including bleeding, pain, infection, and recurrence. The patient verbalized understanding and agreed to proceed with Incision and Drainage of 3 abscesses    The area was cleaned and anesthetized with subcutaneous injection of lidocaine with epinepherine. When adequate anesthesia was achieved the area was prepped and draped in the usual sterile manner. The area was opened sharply and a large amount of purulent material was evacuated. This was done on all 3 abscesses; lateral abscess was bloodier rather than purulent. Other two were purulent. Hemostasis was achieved, then the area was irrigated with normal saline, packed with moist gauze, and covered with a dry dressing. The patient tolerated the procedure well. Thank you for allowing us to participate in the care of this patient. Dr Issa Bridges was aware of situation and discussed w/ patient as well    Total time spent with patient: 50 minutes.     NOTE FROM DR Garfield ALVAREZ  I Rounded with Nurse Practitioner and independently assessed patient with hx and exam and  review appropriate imaging and labs as needed.   I  Agree with plan, and review of systems and independent exam as performed by me and nurse practitioner, as outlined above by NP   Face-to-face time and review of imaging and labs:  50  minutes

## 2022-10-17 NOTE — PROGRESS NOTES
Pharmacist Note - Vancomycin Dosing  Therapy day 5  Indication: Left leg cellulitis s/p I&D 10/17 of 3 abscesses  Current regimen: 1250 mg IV Q 8 hours    Recent Labs     10/17/22  0453 10/16/22  0405 10/15/22  0509   WBC  --  12.2* 14.6*   CREA 1.28 1.10 1.03   BUN 13 12 11       A random vancomycin level of 18 mcg/mL was obtained and from this level, the patient's AUC24 is calculated to be 477 with the current regimen. Goal target range AUC/RAFAEL 400-500      Plan: Continue current regimen. Pharmacy will continue to monitor this patient daily for changes in clinical status and renal function. *Random vancomycin levels are used to calculate AUC/RAFAEL, this level should not be interpreted as a trough. Vancomycin has been dosed using Bayesian kinetics software to target an AUC24:RAFAEL of 400-600, which provides adequate exposure for as assumed infection due to MRSA with an RAFAEL of 1 or less while reducing the risk of nephrotoxicity as seen with traditional trough based dosing goals.

## 2022-10-17 NOTE — PROGRESS NOTES
Tran Samson. [facility] Adult  Hospitalist Group                                                                                          Hospitalist Progress Note  Verenice Montalvo MD  Answering service: [phone] from in house phone        Date of Service:  10/17/2022  NAME:  Patsy Smith  :  1975  MRN:  [id number]      Admission Summary:   Patsy Smith is a 56 y.o. male  with past medical history of obstructive sleep apnea, class III obesity, lymphedema presented to the emergency department via private transport from home with reported initial chief complaint of shortness of breath and leg swelling. on admission the patient is obtunded and not providing history. As such majority of history was obtained per review of ED and electronic medical records. Per ER triage note, patient arrived awake alert oriented x4. He reportedly had chronic leg edema and swelling over the past year. Leg swelling and shortness of breath notably worsened over the past prior 3 days. He initially went to [facility] urgent care center a few days ago and has been given antibiotics for unknown diagnosis. On arrival emergency department tonight, initial recorded vital signs temperature 97.7 °F, /95, heart rate 108, respirate 18, O2 saturation 95% on room air. Per chart records patient was placed on 2 L, followed by 5 L of O2 via nasal cannula and then a hundredths and nonrebreather. Venous blood gas showed pH 7.32, PCO2 55.4, PO2 66, HCO3 28.2, base excess 0.8, SaO2 90.2%. CTA chest with IV contrast was negative for PE and showed no acute process. 12 EKG shows sinus tachycardia, right bundle branch block, left anterior fascicular block 102 bpm.  ED requested admission to the hospitalist service.   However patient was requiring a hundredths and nonrebreather facemask and high risk of deterioration, I request the ED consult with ICU intensivist.  On arrival at the patient's bedside, patient was on CPAP with O2 saturation 99%. As such, orders were placed for admission to the hospitalist service. Interval history / Subjective:   Left leg abscess worse        Assessment & Plan:      Acute on chronic hypercarbic respiratory failure, respiratory acidosis is partially compensated  Untreated sleep apnea/obesity hypoventilation syndrome  Pulmonologist consulted  Continue Bipap HS and prn at nap time  Recommend that he return to the sleep clinic with Dr. Scotty Mix immediately after discharge to reestablish care  He will need compliance with recommended PAP therapy, tracheostomy is another option  Diuresis as long cr tolerate   Lasix 40mg iv daily now     Acute left leg cellulitis:  Stat leg CT no abscess, no acute compartment, check CPK   Continue iv vanco and zosyn for now  WBC worse   Still quite swelling/tender consult general surgeon for the concerning of compartment. dorsalis pulse palpable. No right foot numbness for now   General surgeon and ortho consulted : no surgery for now  ordered left leg US to see if any new abscess collection :3.3 cm x 2.8 cm x 1.5 cm and 2.9 cm x 2.9 cm x 1.6 cm complex fluid collections  with extensive internal echogenic debris and peripheral vascularity in the  subcutaneous soft tissue  Pt will need I&D: this was discussed with general surgeon DR. Sexton 10/16   I&d today with general surgeon  Wound care  Continue zosyn, vanco, may try to get wound culture. Bilateral lower extremity edema/lymphedema   Prn diuresis   Keep legs elevated      Uncontrolled systemic hypertension    Acute metabolic encephalopathy/Altered mental status  -due to hypercapnia.   As such change settings to BiPAP and correct respiratory acidosis  -resolved now       Tachycardia   -continue telemetry monitoring  -resolved      Class 3 obesity  -BMI 49.26 kg/m2  -would urge eventual weight loss, heart healthy diet, lifestyle modification       Code status: full   Prophylaxis: sc lovenox   Care Plan discussed with: pt, jackie rn,   Anticipated Disposition: TBD, home >48 hours      Hospital Problems  Date Reviewed: 2/22/2021            Codes Class Noted POA    Acute respiratory failure with hypoxia and hypercapnia (HCC) ICD-10-CM: J96.01, J96.02  ICD-9-CM: 518.81  10/13/2022 Unknown       Review of Systems:   A comprehensive review of systems was negative except for that written in the HPI. Vital Signs:    Last 24hrs VS reviewed since prior progress note. Most recent are:  Visit Vitals  /74   Pulse 90   Temp 99 °F (37.2 °C)   Resp 21   Ht 5' 8\" (1.727 m)   Wt 150.6 kg (332 lb)   SpO2 92%   BMI 50.48 kg/m²         Intake/Output Summary (Last 24 hours) at 10/17/2022 1525  Last data filed at 10/17/2022 0454  Gross per 24 hour   Intake --   Output 1600 ml   Net -1600 ml          Physical Examination:     I had a face to face encounter with this patient and independently examined them on 10/17/2022 as outlined below:          Constitutional:  No acute distress, cooperative, pleasant    ENT:  Oral mucosa moist, oropharynx benign. Resp:  CTA bilaterally. No wheezing/rhonchi/rales. No accessory muscle use. CV:  Regular rhythm, normal rate, no murmurs, gallops, rubs    GI:  Soft, non distended, non tender. normoactive bowel sounds, no hepatosplenomegaly     Musculoskeletal:  Left leg swelling, posterior abscess open draining now. Lateral abscess I&D. Tender to touch. Pulses palpable     Neurologic:  Moves all extremities.   AAOx3, CN II-XII reviewed            Data Review:    Review and/or order of clinical lab test      Labs:     Recent Labs     10/16/22  0405 10/15/22  0509   WBC 12.2* 14.6*   HGB 12.9 12.8   HCT 41.5 41.7    290       Recent Labs     10/17/22  0453 10/16/22  0405 10/15/22  0509   * 133* 132*   K 4.1 4.1 4.1   CL 96* 99 99   CO2 36* 30 30   BUN 13 12 11   CREA 1.28 1.10 1.03   GLU 99 92 96   CA 9.3 9.4 9.3   MG  --  2.4 2.2   PHOS  --  3.8 3.7       Recent Labs     10/16/22  0405 10/15/22  2947 ALT 22 22   AP 62 65   TBILI 0.5 0.6   TP 7.8 7.8   ALB 2.9* 2.9*   GLOB 4.9* 4.9*       No results for input(s): INR, PTP, APTT, INREXT, INREXT in the last 72 hours. No results for input(s): FE, TIBC, PSAT, FERR in the last 72 hours. No results found for: FOL, RBCF   No results for input(s): PH, PCO2, PO2 in the last 72 hours. No results for input(s): CPK, CKNDX, TROIQ in the last 72 hours.     No lab exists for component: CPKMB    Lab Results   Component Value Date/Time    Cholesterol, total 147 05/07/2013 12:49 PM    HDL Cholesterol 37 (L) 05/07/2013 12:49 PM    LDL, calculated 98 05/07/2013 12:49 PM    Triglyceride 62 05/07/2013 12:49 PM     No results found for: [facility]  Lab Results   Component Value Date/Time    Color YELLOW/STRAW 07/01/2018 11:04 PM    Appearance CLEAR 07/01/2018 11:04 PM    Specific gravity 1.026 07/01/2018 11:04 PM    pH (UA) 5.5 07/01/2018 11:04 PM    Protein NEGATIVE 07/01/2018 11:04 PM    Glucose NEGATIVE 07/01/2018 11:04 PM    Ketone NEGATIVE 07/01/2018 11:04 PM    Bilirubin NEGATIVE 07/01/2018 11:04 PM    Urobilinogen 1.0 07/01/2018 11:04 PM    Nitrites NEGATIVE 07/01/2018 11:04 PM    Leukocyte Esterase NEGATIVE 07/01/2018 11:04 PM    Epithelial cells FEW 07/01/2018 11:04 PM    Bacteria NEGATIVE 07/01/2018 11:04 PM    WBC 0-4 07/01/2018 11:04 PM    RBC 0-5 07/01/2018 11:04 PM         Medications Reviewed:     Current Facility-Administered Medications   Medication Dose Route Frequency    polyethylene glycol (MIRALAX) packet 17 g  17 g Oral DAILY    vancomycin (VANCOCIN) 1,250 mg in 0.9% sodium chloride 250 mL ([id number])  1,250 mg IntraVENous Q8H    furosemide (LASIX) injection 40 mg  40 mg IntraVENous DAILY    HYDROmorphone (DILAUDID) injection 1 mg  1 mg IntraVENous Q4H PRN    carvediloL (COREG) tablet 25 mg  25 mg Oral BID WITH MEALS    sodium chloride (NS) flush 5-40 mL  5-40 mL IntraVENous Q8H    sodium chloride (NS) flush 5-40 mL  5-40 mL IntraVENous PRN    acetaminophen (TYLENOL) tablet 650 mg  650 mg Oral Q6H PRN    Or    acetaminophen (TYLENOL) suppository 650 mg  650 mg Rectal Q6H PRN    ondansetron (ZOFRAN ODT) tablet 4 mg  4 mg Oral Q8H PRN    Or    ondansetron (ZOFRAN) injection 4 mg  4 mg IntraVENous Q6H PRN    [Held by provider] enoxaparin (LOVENOX) injection 30 mg  30 mg SubCUTAneous Q12H    oxyCODONE-acetaminophen (PERCOCET 10)  mg per tablet 1 Tablet  1 Tablet Oral Q4H PRN    piperacillin-tazobactam (ZOSYN) 3.375 g in 0.9% sodium chloride (MBP/ADV) 100 mL MBP  3.375 g IntraVENous Q8H    vancomycin - pharmacy to dose   Other Rx Dosing/Monitoring    hydrALAZINE (APRESOLINE) 20 mg/mL injection 10 mg  10 mg IntraVENous Q6H PRN     ______________________________________________________________________  EXPECTED LENGTH OF STAY: 3d 14h  ACTUAL LENGTH OF STAY:          4                 Lisha Peacock MD

## 2022-10-17 NOTE — PROGRESS NOTES
PULMONARY MEDICINE    Progress Note    Name: Mckenzie Rodriguez   : 1975   MRN: [id number]   Date: 10/17/2022      Subjective:       10/17  O2 requirements up but he feels ok  Intermittent compliant with NIV   ECHO from 10/14  Result status: Final result       Left Ventricle: Normal left ventricular systolic function with a visually estimated EF of 60 - 65%. Left ventricle size is normal. Mildly increased wall thickness. Normal wall motion. Normal diastolic function. 10/14  Significant apnea while when I entered the room. He continues to refuse his NIV  ECHO pending     Consult Note: 10/13  Requesting Physician: Dr. León Thomas  Reason for consult: Acute on chronic hypercapnic respiratory failure    Medical records and data reviewed. Patient is a 56 y.o. male who presented to the hospital with bilateral leg pain, left more than right. This has been worsening for the past 7 days. This was not associated with fever. He was noted to be lethargic on arrival and ABG had suggested partially compensated hypercarbic respiratory failure with hypoxia. We are asked to see him in consultation. History is limited from the patient who is falling asleep in the middle of a conversation. When aroused he does say he is feeling a little better than the time of admission and shortness of breath improves in a recliner in weight he is right now. He managed to awaken enough to say that he has been diagnosed with sleep apnea in the past but has been noncompliant with CPAP. Review of medical records and the [facility] system shows that he underwent a diagnostic polysomnogram with apnea-hypopnea index of 40 events per hour has been followed by Dr. Darrell Ryan and [facility] sleep clinic. Last contact was in 2021. He had been prescribed AutoPap at 5 to 15 cm of water pressure set up on 2020. He also has had refractory pedal edema. Venous ultrasound on admission did not show DVT.   Last echocardiogram was several years ago and had not shown pulmonary hypertension for which he has is at increased risk given untreated sleep apnea. In the hospital he has had poorly controlled systemic hypertension as well. He is receiving antibiotics for possible cellulitis. He has been a smoker in the past.  CT angiogram of the chest had not suggested presence of pulmonary pulm parenchymal or pleural abnormality, no pulmonary emboli was identified    Review of Systems:     A comprehensive 12 system review of systems was limited from the patient    Assessment:   Acute on chronic hypercarbic respiratory failure, respiratory acidosis is partially compensated  Untreated sleep apnea/obesity hypoventilation syndrome  Bilateral lower extremity edema, at risk for pulmonary hypertension  Lower extremity cellulitis  Uncontrolled systemic hypertension  Unknown FEV1  Other medical problems per chart    Recommendations:   BiPAP settings were adjusted-ordered for use with sleep and as needed during naps during the day  Could benefit from diuretics  Receiving antibiotics  Recommend that he return to the sleep clinic with Dr. Jose Meyers immediately after discharge to reestablish care. He will need compliance with recommended PAP therapy, tracheostomy or an Inspire by Dr Jc Pinon at [facility] are other options. Chest film today      Active Problem List:     Problem List  Date Reviewed: 2/22/2021            Codes Class    Acute respiratory failure with hypoxia and hypercapnia (HCC) ICD-10-CM: J96.01, J96.02  ICD-9-CM: 518.81         Cellulitis ICD-10-CM: L03.90  ICD-9-CM: 682.9          Past Medical History:      has a past medical history of Sleep apnea (2018). Past Surgical History:      has no past surgical history on file. Home Medications:     Prior to Admission medications    Medication Sig Start Date End Date Taking? Authorizing Provider   acetaminophen (TYLENOL) 325 mg tablet Take 2 Tablets by mouth every six (6) hours as needed for Fever.  10/4/22   Natalia Encarnacion MARLENE Krishnamurthy   cetirizine (ZyrTEC) 10 mg tablet Take 1 Tablet by mouth daily. 10/4/22   MARLENE Sorenson   gabapentin (NEURONTIN) 600 mg tablet TAKE 1 CAPSULE FOR 2 DAYS, THEN 2 CAPS AT BEDTIME FOR 2 DAYS, THEN 1 IN MORNING AND 2 IN EVENING  Patient not taking: Reported on 10/4/2022 8/3/20   Provider, Low   furosemide (LASIX) 40 mg tablet Take 1 Tab by mouth daily. Patient not taking: Reported on 10/4/2022 12/20/19   Jared Rodriguez MD       Allergies/Social/Family History:     No Known Allergies   Social History     Tobacco Use    Smoking status: Former     Packs/day: 0.50     Years: 5.00     Pack years: 2.50     Types: Cigarettes    Smokeless tobacco: Never   Substance Use Topics    Alcohol use: No      Family History   Problem Relation Age of Onset    Diabetes Mother             Objective:   Vital Signs:  Visit Vitals  /74   Pulse 89   Temp 99 °F (37.2 °C)   Resp 21   Ht 5' 8\" (1.727 m)   Wt 150.6 kg (332 lb)   SpO2 92%   BMI 50.48 kg/m²    O2 Flow Rate (L/min): 6 l/min O2 Device: Nasal cannula Temp (24hrs), Av °F (37.2 °C), Min:98.8 °F (37.1 °C), Max:99.1 °F (37.3 °C)           Intake/Output:     Intake/Output Summary (Last 24 hours) at 10/17/2022 1142  Last data filed at 10/17/2022 0454  Gross per 24 hour   Intake --   Output 1900 ml   Net -1900 ml         Physical Exam:   General:  Drowsy, no distress, appears stated age. Head:  Normocephalic, without obvious abnormality, atraumatic. Eyes:  Conjunctivae/corneas clear. PERRL   Neck: Supple, symmetrical, no adenopathy, no carotid bruit and no JVD. Lungs:   Diminished breath sounds   Chest wall:  No tenderness or deformity. Heart:  Regular rate and rhythm, S1-S2 normal, no murmur, no click, rub or gallop. Abdomen:   Soft, non-tender. Bowel sounds present. No masses,  No organomegaly.    Extremities: Atraumatic, no cyanosis, bilateral 4+ edema   Pulses: Palpable   Skin: No rashes or lesions   Neurologic: Grossly nonfocal        LABS AND  DATA: Personally reviewed  Recent Labs     10/16/22  0405 10/15/22  0509   WBC 12.2* 14.6*   HGB 12.9 12.8   HCT 41.5 41.7    290       Recent Labs     10/17/22  0453 10/16/22  0405 10/15/22  0509   * 133* 132*   K 4.1 4.1 4.1   CL 96* 99 99   CO2 36* 30 30   BUN 13 12 11   CREA 1.28 1.10 1.03   GLU 99 92 96   CA 9.3 9.4 9.3   MG  --  2.4 2.2   PHOS  --  3.8 3.7       Recent Labs     10/16/22  0405 10/15/22  0509   AP 62 65   TP 7.8 7.8   ALB 2.9* 2.9*   GLOB 4.9* 4.9*       No results for input(s): INR, PTP, APTT, INREXT, INREXT in the last 72 hours. No results for input(s): PHI, PCO2I, PO2I, FIO2I in the last 72 hours. No results for input(s): CPK, CKMB, TROIQ, BNPP in the last 72 hours.       MEDS: Reviewed  Current Facility-Administered Medications   Medication Dose Route Frequency    lidocaine-EPINEPHrine (XYLOCAINE) 2 %-1:100,000 injection 200 mg  10 mL IntraDERMal ONCE    polyethylene glycol (MIRALAX) packet 17 g  17 g Oral DAILY    vancomycin (VANCOCIN) 1,250 mg in 0.9% sodium chloride 250 mL ([id number])  1,250 mg IntraVENous Q8H    furosemide (LASIX) injection 40 mg  40 mg IntraVENous DAILY    HYDROmorphone (DILAUDID) injection 1 mg  1 mg IntraVENous Q4H PRN    carvediloL (COREG) tablet 25 mg  25 mg Oral BID WITH MEALS    sodium chloride (NS) flush 5-40 mL  5-40 mL IntraVENous Q8H    sodium chloride (NS) flush 5-40 mL  5-40 mL IntraVENous PRN    acetaminophen (TYLENOL) tablet 650 mg  650 mg Oral Q6H PRN    Or    acetaminophen (TYLENOL) suppository 650 mg  650 mg Rectal Q6H PRN    ondansetron (ZOFRAN ODT) tablet 4 mg  4 mg Oral Q8H PRN    Or    ondansetron (ZOFRAN) injection 4 mg  4 mg IntraVENous Q6H PRN    [Held by provider] enoxaparin (LOVENOX) injection 30 mg  30 mg SubCUTAneous Q12H    oxyCODONE-acetaminophen (PERCOCET 10)  mg per tablet 1 Tablet  1 Tablet Oral Q4H PRN    piperacillin-tazobactam (ZOSYN) 3.375 g in 0.9% sodium chloride (MBP/ADV) 100 mL MBP  3.375 g IntraVENous Q8H    vancomycin - pharmacy to dose   Other Rx Dosing/Monitoring    hydrALAZINE (APRESOLINE) 20 mg/mL injection 10 mg  10 mg IntraVENous Q6H PRN       Chest Imaging: personally reviewed and report checked  Results from Hospital Encounter encounter on 10/12/22    XR CHEST PA LAT    Narrative  EXAM: XR CHEST PA LAT    INDICATION: Sleep apnea    COMPARISON: October 4, 2022    TECHNIQUE: PA and lateral chest views    FINDINGS: The cardiomediastinal and hilar contours are within normal limits. The  pulmonary vasculature is within normal limits. The lungs and pleural spaces are clear. The visualized bones and upper abdomen  are age-appropriate. Impression  Normal PA and lateral chest views. Results from [place] encounter on 10/12/22    CT LOW EXT LT W CONT    Narrative  EXAM: CT LOW EXT LT W CONT    INDICATION: Left leg swelling, rule out abscess, compartment    COMPARISON: Left lower extremity venous duplex 7/15/2018    CONTRAST: 100 mL of Isovue-300. TECHNIQUE: Helical CT of the left lower extremity (from the distal femur through  the toes) during uneventful rapid bolus intravenous contrast administration. Coronal and Sagittal reformats were generated. Images reviewed in soft tissue  and bone windows. CT dose reduction was achieved through the use of a  standardized protocol tailored for this examination and automatic exposure  control for dose modulation. FINDINGS: Bones: Normal bone mineralization. No fracture, dislocation, or  periosteal reaction. Joint fluid: None. Articulations: Mild osteoarthritis involving the knee, ankle, and foot. Tendons: No full-thickness tendon tear. Muscles: No intramuscular hematoma. No focal atrophy. Soft tissues: Diffuse circumferential skin thickening and subcutaneous fat  stranding/edema and swelling in the lower leg, compatible with cellulitis. No  focal fluid collection to suggest abscess.  16 mm x 7 mm x 5 mm focus of nodular  cutaneous enhancement at the medial hindfoot (302-187, 304-208). Impression  Lower leg cellulitis. No abscess. Focal nodular cutaneous enhancing focus in the  medial hindfoot, correlate clinically. Tele- reviewed    Medical decision making:   I have reviewed the flowsheet and previous day's notes  Patient has acute or chronic illness that poses a threat to life or bodily function  Review and order of Clinical lab tests  Review and Order of Radiology tests  Independent visualization of Image  Reviewed Oxygen/ NiPPV  High Risk Drug therapy requiring intensive monitoring for toxicity: eg steroids, pressors, antibiotics        Thank you for allowing me to participate in this patient's care.     Edelmira Schwartz PA-C      [facility]

## 2022-10-17 NOTE — PROGRESS NOTES
Progress Note    Patient: Puma Cardenas MRN: [id number]  SSN: [id number]    YOB: 1975  Age: 56 y.o. Sex: male      Admit Date: 10/12/2022    Left leg cellulitis and abscess    Subjective:     No acute surgical issues. Pt with left leg cellulitis who has now developed several areas of fluctuance    Objective:     Visit Vitals  BP (!) 107/59 (BP 1 Location: Right upper arm, BP Patient Position: At rest)   Pulse 88   Temp 98.8 °F (37.1 °C)   Resp 22   Ht 5' 8\" (1.727 m)   Wt 332 lb (150.6 kg)   SpO2 95%   BMI 50.48 kg/m²       Temp (24hrs), Av °F (37.2 °C), Min:98.7 °F (37.1 °C), Max:99.4 °F (37.4 °C)      Physical Exam:    Gen:  NAD. Alert and oriented x4  Pulm:  Unlabored. Diminished  Left leg:  left lateral aspect of leg with purulent drainage. There is also a second area of fluctuance on the lower medial aspect of left leg. Recent Results (from the past 24 hour(s))   CBC WITH AUTOMATED DIFF    Collection Time: 10/16/22  4:05 AM   Result Value Ref Range    WBC 12.2 (H) 4.1 - 11.1 K/uL    RBC 4.40 4. 10 - 5.70 M/uL    HGB 12.9 12.1 - 17.0 g/dL    HCT 41.5 36.6 - 50.3 %    MCV 94.3 80.0 - 99.0 FL    MCH 29.3 26.0 - 34.0 PG    MCHC 31.1 30.0 - 36.5 g/dL    RDW 14.0 11.5 - 14.5 %    PLATELET 054 395 - 316 K/uL    MPV 9.3 8.9 - 12.9 FL    NRBC 0.0 0  WBC    ABSOLUTE NRBC 0.00 0.00 - 0.01 K/uL    NEUTROPHILS 81 (H) 32 - 75 %    LYMPHOCYTES 9 (L) 12 - 49 %    MONOCYTES 8 5 - 13 %    EOSINOPHILS 1 0 - 7 %    BASOPHILS 0 0 - 1 %    IMMATURE GRANULOCYTES 1 (H) 0.0 - 0.5 %    ABS. NEUTROPHILS 10.0 (H) 1.8 - 8.0 K/UL    ABS. LYMPHOCYTES 1.1 0.8 - 3.5 K/UL    ABS. MONOCYTES 1.0 0.0 - 1.0 K/UL    ABS. EOSINOPHILS 0.1 0.0 - 0.4 K/UL    ABS. BASOPHILS 0.0 0.0 - 0.1 K/UL    ABS. IMM.  GRANS. 0.1 (H) 0.00 - 0.04 K/UL    DF AUTOMATED     METABOLIC PANEL, COMPREHENSIVE    Collection Time: 10/16/22  4:05 AM   Result Value Ref Range    Sodium 133 (L) 136 - 145 mmol/L    Potassium 4.1 3.5 - 5.1 mmol/L Chloride 99 97 - 108 mmol/L    CO2 30 21 - 32 mmol/L    Anion gap 4 (L) 5 - 15 mmol/L    Glucose 92 65 - 100 mg/dL    BUN 12 6 - 20 MG/DL    Creatinine 1.10 0.70 - 1.30 MG/DL    BUN/Creatinine ratio 11 (L) 12 - 20      eGFR >60 >60 ml/min/1.73m2    Calcium 9.4 8.5 - 10.1 MG/DL    Bilirubin, total 0.5 0.2 - 1.0 MG/DL    ALT (SGPT) 22 12 - 78 U/L    AST (SGOT) 14 (L) 15 - 37 U/L    Alk. phosphatase 62 45 - 117 U/L    Protein, total 7.8 6.4 - 8.2 g/dL    Albumin 2.9 (L) 3.5 - 5.0 g/dL    Globulin 4.9 (H) 2.0 - 4.0 g/dL    A-G Ratio 0.6 (L) 1.1 - 2.2     MAGNESIUM    Collection Time: 10/16/22  4:05 AM   Result Value Ref Range    Magnesium 2.4 1.6 - 2.4 mg/dL   PHOSPHORUS    Collection Time: 10/16/22  4:05 AM   Result Value Ref Range    Phosphorus 3.8 2.6 - 4.7 MG/DL             Assessment:     Hospital Problems  Date Reviewed: 2/22/2021            Codes Class Noted POA    Acute respiratory failure with hypoxia and hypercapnia (HCC) ICD-10-CM: J96.01, J96.02  ICD-9-CM: 518.81  10/13/2022 Unknown           Plan/Recommendations/Medical Decision Making:     - Left leg cellulitis and abscess:  Local wound care to lateral draining wound with dry dressing changes.     - Surgical team will plan to do bedside I&D of medial leg wound tomorrow  - Continue antibiotic therapy  - Diet as tolerated

## 2022-10-17 NOTE — PROGRESS NOTES
Bedside and Verbal shift change report given to Natalie Shelby RN (oncoming nurse) by [facility], RN (offgoing nurse). Report included the following information SBAR, Kardex, and MAR.

## 2022-10-18 LAB
ALBUMIN SERPL-MCNC: 2.9 G/DL (ref 3.5–5)
ALBUMIN/GLOB SERPL: 0.7 {RATIO} (ref 1.1–2.2)
ALP SERPL-CCNC: 56 U/L (ref 45–117)
ALT SERPL-CCNC: 25 U/L (ref 12–78)
ANION GAP SERPL CALC-SCNC: 5 MMOL/L (ref 5–15)
AST SERPL-CCNC: 17 U/L (ref 15–37)
BASOPHILS # BLD: 0 K/UL (ref 0–0.1)
BASOPHILS NFR BLD: 0 % (ref 0–1)
BILIRUB SERPL-MCNC: 0.4 MG/DL (ref 0.2–1)
BUN SERPL-MCNC: 11 MG/DL (ref 6–20)
BUN/CREAT SERPL: 11 (ref 12–20)
CALCIUM SERPL-MCNC: 9.3 MG/DL (ref 8.5–10.1)
CHLORIDE SERPL-SCNC: 99 MMOL/L (ref 97–108)
CO2 SERPL-SCNC: 32 MMOL/L (ref 21–32)
CREAT SERPL-MCNC: 1 MG/DL (ref 0.7–1.3)
DIFFERENTIAL METHOD BLD: NORMAL
EOSINOPHIL # BLD: 0.1 K/UL (ref 0–0.4)
EOSINOPHIL NFR BLD: 1 % (ref 0–7)
ERYTHROCYTE [DISTWIDTH] IN BLOOD BY AUTOMATED COUNT: 14 % (ref 11.5–14.5)
GLOBULIN SER CALC-MCNC: 4.3 G/DL (ref 2–4)
GLUCOSE SERPL-MCNC: 96 MG/DL (ref 65–100)
HCT VFR BLD AUTO: 40.2 % (ref 36.6–50.3)
HGB BLD-MCNC: 12.4 G/DL (ref 12.1–17)
IMM GRANULOCYTES # BLD AUTO: 0 K/UL (ref 0–0.04)
IMM GRANULOCYTES NFR BLD AUTO: 0 % (ref 0–0.5)
LYMPHOCYTES # BLD: 1.1 K/UL (ref 0.8–3.5)
LYMPHOCYTES NFR BLD: 13 % (ref 12–49)
MAGNESIUM SERPL-MCNC: 2.5 MG/DL (ref 1.6–2.4)
MCH RBC QN AUTO: 29.1 PG (ref 26–34)
MCHC RBC AUTO-ENTMCNC: 30.8 G/DL (ref 30–36.5)
MCV RBC AUTO: 94.4 FL (ref 80–99)
MONOCYTES # BLD: 0.9 K/UL (ref 0–1)
MONOCYTES NFR BLD: 11 % (ref 5–13)
NEUTS SEG # BLD: 5.9 K/UL (ref 1.8–8)
NEUTS SEG NFR BLD: 75 % (ref 32–75)
NRBC # BLD: 0 K/UL (ref 0–0.01)
NRBC BLD-RTO: 0 PER 100 WBC
PHOSPHATE SERPL-MCNC: 3.9 MG/DL (ref 2.6–4.7)
PLATELET # BLD AUTO: 326 K/UL (ref 150–400)
PMV BLD AUTO: 9.1 FL (ref 8.9–12.9)
POTASSIUM SERPL-SCNC: 4.6 MMOL/L (ref 3.5–5.1)
PROT SERPL-MCNC: 7.2 G/DL (ref 6.4–8.2)
RBC # BLD AUTO: 4.26 M/UL (ref 4.1–5.7)
SODIUM SERPL-SCNC: 136 MMOL/L (ref 136–145)
WBC # BLD AUTO: 8.1 K/UL (ref 4.1–11.1)

## 2022-10-18 PROCEDURE — 80053 COMPREHEN METABOLIC PANEL: CPT

## 2022-10-18 PROCEDURE — 74011000250 HC RX REV CODE- 250: Performed by: FAMILY MEDICINE

## 2022-10-18 PROCEDURE — 0J9P0ZZ DRAINAGE OF LEFT LOWER LEG SUBCUTANEOUS TISSUE AND FASCIA, OPEN APPROACH: ICD-10-PCS | Performed by: FAMILY MEDICINE

## 2022-10-18 PROCEDURE — 36415 COLL VENOUS BLD VENIPUNCTURE: CPT

## 2022-10-18 PROCEDURE — 85025 COMPLETE CBC W/AUTO DIFF WBC: CPT

## 2022-10-18 PROCEDURE — 74011250636 HC RX REV CODE- 250/636: Performed by: FAMILY MEDICINE

## 2022-10-18 PROCEDURE — 83735 ASSAY OF MAGNESIUM: CPT

## 2022-10-18 PROCEDURE — 0H9LXZZ DRAINAGE OF LEFT LOWER LEG SKIN, EXTERNAL APPROACH: ICD-10-PCS | Performed by: FAMILY MEDICINE

## 2022-10-18 PROCEDURE — 74011250637 HC RX REV CODE- 250/637: Performed by: INTERNAL MEDICINE

## 2022-10-18 PROCEDURE — 74011250636 HC RX REV CODE- 250/636: Performed by: HOSPITALIST

## 2022-10-18 PROCEDURE — 84100 ASSAY OF PHOSPHORUS: CPT

## 2022-10-18 PROCEDURE — 74011000250 HC RX REV CODE- 250: Performed by: NURSE PRACTITIONER

## 2022-10-18 PROCEDURE — 10060 I&D ABSCESS SIMPLE/SINGLE: CPT

## 2022-10-18 PROCEDURE — 65270000046 HC RM TELEMETRY

## 2022-10-18 PROCEDURE — 74011250637 HC RX REV CODE- 250/637: Performed by: HOSPITALIST

## 2022-10-18 RX ORDER — LIDOCAINE HYDROCHLORIDE AND EPINEPHRINE 20; 10 MG/ML; UG/ML
1.5 INJECTION, SOLUTION INFILTRATION; PERINEURAL ONCE
Status: COMPLETED | OUTPATIENT
Start: 2022-10-18 | End: 2022-10-18

## 2022-10-18 RX ORDER — LORAZEPAM 1 MG/1
1 TABLET ORAL
Status: DISCONTINUED | OUTPATIENT
Start: 2022-10-18 | End: 2022-10-21 | Stop reason: HOSPADM

## 2022-10-18 RX ADMIN — HYDROMORPHONE HYDROCHLORIDE 1 MG: 1 INJECTION, SOLUTION INTRAMUSCULAR; INTRAVENOUS; SUBCUTANEOUS at 05:36

## 2022-10-18 RX ADMIN — ENOXAPARIN SODIUM 30 MG: 100 INJECTION SUBCUTANEOUS at 20:51

## 2022-10-18 RX ADMIN — OXYCODONE AND ACETAMINOPHEN 1 TABLET: 10; 325 TABLET ORAL at 15:13

## 2022-10-18 RX ADMIN — OXYCODONE AND ACETAMINOPHEN 1 TABLET: 10; 325 TABLET ORAL at 10:48

## 2022-10-18 RX ADMIN — HYDROMORPHONE HYDROCHLORIDE 1 MG: 1 INJECTION, SOLUTION INTRAMUSCULAR; INTRAVENOUS; SUBCUTANEOUS at 09:44

## 2022-10-18 RX ADMIN — LIDOCAINE HYDROCHLORIDE AND EPINEPHRINE 30 MG: 20; 10 INJECTION, SOLUTION INFILTRATION; PERINEURAL at 13:00

## 2022-10-18 RX ADMIN — CARVEDILOL 25 MG: 12.5 TABLET, FILM COATED ORAL at 17:09

## 2022-10-18 RX ADMIN — VANCOMYCIN HYDROCHLORIDE 1250 MG: 1.25 INJECTION, POWDER, LYOPHILIZED, FOR SOLUTION INTRAVENOUS at 17:09

## 2022-10-18 RX ADMIN — POLYETHYLENE GLYCOL 3350 17 G: 17 POWDER, FOR SOLUTION ORAL at 08:08

## 2022-10-18 RX ADMIN — SODIUM CHLORIDE, PRESERVATIVE FREE 10 ML: 5 INJECTION INTRAVENOUS at 15:13

## 2022-10-18 RX ADMIN — HYDROMORPHONE HYDROCHLORIDE 1 MG: 1 INJECTION, SOLUTION INTRAMUSCULAR; INTRAVENOUS; SUBCUTANEOUS at 23:37

## 2022-10-18 RX ADMIN — SODIUM CHLORIDE, PRESERVATIVE FREE 10 ML: 5 INJECTION INTRAVENOUS at 22:01

## 2022-10-18 RX ADMIN — SODIUM CHLORIDE, PRESERVATIVE FREE 10 ML: 5 INJECTION INTRAVENOUS at 05:35

## 2022-10-18 RX ADMIN — HYDROMORPHONE HYDROCHLORIDE 1 MG: 1 INJECTION, SOLUTION INTRAMUSCULAR; INTRAVENOUS; SUBCUTANEOUS at 18:27

## 2022-10-18 RX ADMIN — OXYCODONE AND ACETAMINOPHEN 1 TABLET: 10; 325 TABLET ORAL at 20:51

## 2022-10-18 RX ADMIN — LORAZEPAM 1 MG: 1 TABLET ORAL at 00:49

## 2022-10-18 RX ADMIN — VANCOMYCIN HYDROCHLORIDE 1250 MG: 1.25 INJECTION, POWDER, LYOPHILIZED, FOR SOLUTION INTRAVENOUS at 08:07

## 2022-10-18 RX ADMIN — OXYCODONE AND ACETAMINOPHEN 1 TABLET: 10; 325 TABLET ORAL at 00:49

## 2022-10-18 RX ADMIN — VANCOMYCIN HYDROCHLORIDE 1250 MG: 1.25 INJECTION, POWDER, LYOPHILIZED, FOR SOLUTION INTRAVENOUS at 00:49

## 2022-10-18 RX ADMIN — HYDROMORPHONE HYDROCHLORIDE 1 MG: 1 INJECTION, SOLUTION INTRAMUSCULAR; INTRAVENOUS; SUBCUTANEOUS at 12:53

## 2022-10-18 RX ADMIN — CARVEDILOL 25 MG: 12.5 TABLET, FILM COATED ORAL at 08:07

## 2022-10-18 RX ADMIN — FUROSEMIDE 40 MG: 10 INJECTION, SOLUTION INTRAVENOUS at 08:07

## 2022-10-18 RX ADMIN — LORAZEPAM 1 MG: 1 TABLET ORAL at 23:26

## 2022-10-18 NOTE — PROCEDURES
PROCEDURE NOTE    Date of Procedure: 10/18/22    Preoperative Diagnosis: Left lower extremity abscess     Procedure: incision and drainage of left lower extremity abscess     Completed by : Edith Herrera NP    Assistant(s): Cyn Ann NP & CAMMIE Pollack [facility]       Anesthesia: lidocaine with epi      Description of Operation:   Bridget Dickerson was identified. Informed consent was obtained after the procedure was explained to the patient in detail, including but not limited to the risks including bleeding, pain, infection, and recurrence. The patient verbalized understanding and agreed to proceed with incision and drainage of Left lower extremity abscess. The area was cleaned and anesthetized with subcutaneous injection of lidocaine with epinepherine. When adequate anesthesia was achieved the area was prepped and draped in the usual sterile manner. The area was opened sharply and a moderate amount of purulent bloody material was evacuated. Hemostasis was achieved, then the area was irrigated with normal saline, packed with silver packing strips, and covered with a dry dressing. The patient tolerated the procedure well. D/W Dr. Umang Wallis. Estimated Blood Loss: 3 ml    Complications: none      Thank you for allowing us to participate in the care of this patient. Total time spent with patient: 20 minutes.     Signed By: Edith Herrera NP     October 18, 2022              [address] [place] [address] [facility]

## 2022-10-18 NOTE — PROGRESS NOTES
General Surgery Daily Progress Note    Admit Date: 10/12/2022  Post-Operative Day: * No surgery found * from * No surgery found *     Subjective:   CC: left leg pain; abscess. Last 24 hrs: patient up in chair; still having left lower extremity pain. Left anterior distal shin abscess painful and no longer draining. Objective:     Blood pressure 135/88, pulse 97, temperature 99 °F (37.2 °C), resp. rate 15, height 5' 8\" (1.727 m), weight 332 lb (150.6 kg), SpO2 93 %. Temp (24hrs), Av.6 °F (37 °C), Min:98.4 °F (36.9 °C), Max:99 °F (37.2 °C)      _____________________  Physical Exam:     Alert and Oriented x 3, in no acute distress. Cardiovascular: RRR, S1S2  Lungs:CTAB; unlabored   Pt has 3 abscesses on L leg: lateral  and posterior calf draining bloody drainage,  Anterior distal shin fluctuant and tender small opening with scan amt bloody drainage. See [facility] note for further details     Data Review:    Recent Labs     10/18/22  0530 10/16/22  0405   WBC 8.1 12.2*   HGB 12.4 12.9   HCT 40.2 41.5    316     Recent Labs     10/18/22  0530 10/17/22  0453 10/16/22  0405    131* 133*   K 4.6 4.1 4.1   CL 99 96* 99   CO2 32 36* 30   GLU 96 99 92   BUN 11 13 12   CREA 1.00 1.28 1.10   CA 9.3 9.3 9.4   MG 2.5*  --  2.4   PHOS 3.9  --  3.8   ALB 2.9*  --  2.9*   ALT 25  --  22     No results for input(s): AML, LPSE in the last 72 hours.         ______________________  Medications:    Current Facility-Administered Medications   Medication Dose Route Frequency    lidocaine-EPINEPHrine (XYLOCAINE) 2 %-1:100,000 injection 30 mg  1.5 mL SubCUTAneous ONCE    polyethylene glycol (MIRALAX) packet 17 g  17 g Oral DAILY    vancomycin (VANCOCIN) 1,250 mg in 0.9% sodium chloride 250 mL ([id number])  1,250 mg IntraVENous Q8H    furosemide (LASIX) injection 40 mg  40 mg IntraVENous DAILY    HYDROmorphone (DILAUDID) injection 1 mg  1 mg IntraVENous Q4H PRN    carvediloL (COREG) tablet 25 mg  25 mg Oral BID WITH MEALS    sodium chloride (NS) flush 5-40 mL  5-40 mL IntraVENous Q8H    sodium chloride (NS) flush 5-40 mL  5-40 mL IntraVENous PRN    acetaminophen (TYLENOL) tablet 650 mg  650 mg Oral Q6H PRN    Or    acetaminophen (TYLENOL) suppository 650 mg  650 mg Rectal Q6H PRN    ondansetron (ZOFRAN ODT) tablet 4 mg  4 mg Oral Q8H PRN    Or    ondansetron (ZOFRAN) injection 4 mg  4 mg IntraVENous Q6H PRN    enoxaparin (LOVENOX) injection 30 mg  30 mg SubCUTAneous Q12H    oxyCODONE-acetaminophen (PERCOCET 10)  mg per tablet 1 Tablet  1 Tablet Oral Q4H PRN    vancomycin - pharmacy to dose   Other Rx Dosing/Monitoring    hydrALAZINE (APRESOLINE) 20 mg/mL injection 10 mg  10 mg IntraVENous Q6H PRN               Assessment:     56year old male with left lower extremity abscess x 3    Plan:     Incision and drainage of left lower extremity - anterior distal shin today   (See Procedure note)   Continue with IV antibiotics   D/W Dr. Morgan Cline, NP    10/18/2022

## 2022-10-18 NOTE — PROGRESS NOTES
2000 Verbal bedside update given to Shruthi Samuel RN oncoming nurse by Natalie Shelby RN off-going nurse. Report included current pt status and condition, recent results, hx of present illness, heart rate and rhythm, and respiratory status.

## 2022-10-18 NOTE — PROGRESS NOTES
[address] [facility] Adult  Hospitalist Group                                                                                          Hospitalist Progress Note  Lesley Martin MD  Answering service: [phone] from in house phone        Date of Service:  10/18/2022  NAME:  Italia Morin  :  1975  MRN:  [id number]      Admission Summary:   Italia Morin is a 56 y.o. male  with past medical history of obstructive sleep apnea, class III obesity, lymphedema presented to the emergency department via private transport from home with reported initial chief complaint of shortness of breath and leg swelling. on admission the patient is obtunded and not providing history. As such majority of history was obtained per review of ED and electronic medical records. Per ER triage note, patient arrived awake alert oriented x4. He reportedly had chronic leg edema and swelling over the past year. Leg swelling and shortness of breath notably worsened over the past prior 3 days. He initially went to [facility] urgent care center a few days ago and has been given antibiotics for unknown diagnosis. On arrival emergency department tonight, initial recorded vital signs temperature 97.7 °F, /95, heart rate 108, respirate 18, O2 saturation 95% on room air. Per chart records patient was placed on 2 L, followed by 5 L of O2 via nasal cannula and then a hundredths and nonrebreather. Venous blood gas showed pH 7.32, PCO2 55.4, PO2 66, HCO3 28.2, base excess 0.8, SaO2 90.2%. CTA chest with IV contrast was negative for PE and showed no acute process. 12 EKG shows sinus tachycardia, right bundle branch block, left anterior fascicular block 102 bpm.  ED requested admission to the hospitalist service.   However patient was requiring a hundredths and nonrebreather facemask and high risk of deterioration, I request the ED consult with ICU intensivist.  On arrival at the patient's bedside, patient was on CPAP with O2 saturation 99%. As such, orders were placed for admission to the hospitalist service. Interval history / Subjective:   S/p I&D yesterday,   Still in pain , but slightly better          Assessment & Plan:      Acute on chronic hypercarbic respiratory failure, respiratory acidosis is partially compensated  Untreated sleep apnea/obesity hypoventilation syndrome  Pulmonologist consulted  Continue Bipap HS and prn at nap time  Recommend that he return to the sleep clinic with Dr. Leidy Card immediately after discharge to reestablish care  He will need compliance with recommended PAP therapy, tracheostomy is another option  Diuresis as long cr tolerate   Lasix 40mg iv daily now     Acute left leg cellulitis:  Stat leg CT no abscess, no acute compartment, check CPK   Continue iv vanco and zosyn for now  WBC worse   Still quite swelling/tender consult general surgeon for the concerning of compartment. dorsalis pulse palpable. No right foot numbness for now   General surgeon and ortho consulted : no surgery for now  ordered left leg US to see if any new abscess collection :3.3 cm x 2.8 cm x 1.5 cm and 2.9 cm x 2.9 cm x 1.6 cm complex fluid collections  with extensive internal echogenic debris and peripheral vascularity in the  subcutaneous soft tissue  Pt will need I&D: this was discussed with general surgeon DR. Sexton 10/16   I&d 10/17 with general surgeon  Pt has 3 abscesses on L leg: lateral  and posterior calf draining bloody drainage,  Anterior distal shin fluctuant and tender small opening with scan amt bloody drainage: repeat I&D today for the anterior abscess    -dc zosyn 10/18  Continue iv vanco   Wound care      Bilateral lower extremity edema/lymphedema   Prn diuresis   Keep legs elevated      Uncontrolled systemic hypertension    Acute metabolic encephalopathy/Altered mental status  -due to hypercapnia.   As such change settings to BiPAP and correct respiratory acidosis  -resolved now       Tachycardia -continue telemetry monitoring  -resolved      Class 3 obesity  -BMI 49.26 kg/m2  -would urge eventual weight loss, heart healthy diet, lifestyle modification       Code status: full   Prophylaxis: sc lovenox   Care Plan discussed with: pt, jackie, rn,   Anticipated Disposition: TBD, home >48 hours      Hospital Problems  Date Reviewed: 2/22/2021            Codes Class Noted POA    Acute respiratory failure with hypoxia and hypercapnia (HCC) ICD-10-CM: J96.01, J96.02  ICD-9-CM: 518.81  10/13/2022 Unknown       Review of Systems:   A comprehensive review of systems was negative except for that written in the HPI. Vital Signs:    Last 24hrs VS reviewed since prior progress note. Most recent are:  Visit Vitals  /88 (BP 1 Location: Left upper arm, BP Patient Position: Sitting)   Pulse 97   Temp 99 °F (37.2 °C)   Resp 15   Ht 5' 8\" (1.727 m)   Wt 150.6 kg (332 lb)   SpO2 93%   BMI 50.48 kg/m²         Intake/Output Summary (Last 24 hours) at 10/18/2022 1422  Last data filed at 10/18/2022 1220  Gross per 24 hour   Intake --   Output 750 ml   Net -750 ml          Physical Examination:     I had a face to face encounter with this patient and independently examined them on 10/18/2022 as outlined below:          Constitutional:  No acute distress, cooperative, pleasant    ENT:  Oral mucosa moist, oropharynx benign. Resp:  CTA bilaterally. No wheezing/rhonchi/rales. No accessory muscle use. CV:  Regular rhythm, normal rate, no murmurs, gallops, rubs    GI:  Soft, non distended, non tender. normoactive bowel sounds, no hepatosplenomegaly     Musculoskeletal:  Left leg swelling, posterior abscess open draining now. Lateral abscess I&D. Tender to touch. Pulses palpable     Neurologic:  Moves all extremities.   AAOx3, CN II-XII reviewed            Data Review:    Review and/or order of clinical lab test      Labs:     Recent Labs     10/18/22  0530 10/16/22  0405   WBC 8.1 12.2*   HGB 12.4 12.9   HCT 40.2 41.5   PLT 326 316       Recent Labs     10/18/22  0530 10/17/22  0453 10/16/22  0405    131* 133*   K 4.6 4.1 4.1   CL 99 96* 99   CO2 32 36* 30   BUN 11 13 12   CREA 1.00 1.28 1.10   GLU 96 99 92   CA 9.3 9.3 9.4   MG 2.5*  --  2.4   PHOS 3.9  --  3.8       Recent Labs     10/18/22  0530 10/16/22  0405   ALT 25 22   AP 56 62   TBILI 0.4 0.5   TP 7.2 7.8   ALB 2.9* 2.9*   GLOB 4.3* 4.9*       No results for input(s): INR, PTP, APTT, INREXT, INREXT in the last 72 hours. No results for input(s): FE, TIBC, PSAT, FERR in the last 72 hours. No results found for: FOL, RBCF   No results for input(s): PH, PCO2, PO2 in the last 72 hours. No results for input(s): CPK, CKNDX, TROIQ in the last 72 hours.     No lab exists for component: CPKMB    Lab Results   Component Value Date/Time    Cholesterol, total 147 05/07/2013 12:49 PM    HDL Cholesterol 37 (L) 05/07/2013 12:49 PM    LDL, calculated 98 05/07/2013 12:49 PM    Triglyceride 62 05/07/2013 12:49 PM     No results found for: [facility]  Lab Results   Component Value Date/Time    Color YELLOW/STRAW 07/01/2018 11:04 PM    Appearance CLEAR 07/01/2018 11:04 PM    Specific gravity 1.026 07/01/2018 11:04 PM    pH (UA) 5.5 07/01/2018 11:04 PM    Protein NEGATIVE 07/01/2018 11:04 PM    Glucose NEGATIVE 07/01/2018 11:04 PM    Ketone NEGATIVE 07/01/2018 11:04 PM    Bilirubin NEGATIVE 07/01/2018 11:04 PM    Urobilinogen 1.0 07/01/2018 11:04 PM    Nitrites NEGATIVE 07/01/2018 11:04 PM    Leukocyte Esterase NEGATIVE 07/01/2018 11:04 PM    Epithelial cells FEW 07/01/2018 11:04 PM    Bacteria NEGATIVE 07/01/2018 11:04 PM    WBC 0-4 07/01/2018 11:04 PM    RBC 0-5 07/01/2018 11:04 PM         Medications Reviewed:     Current Facility-Administered Medications   Medication Dose Route Frequency    lidocaine-EPINEPHrine (XYLOCAINE) 2 %-1:100,000 injection 30 mg  1.5 mL SubCUTAneous ONCE    polyethylene glycol (MIRALAX) packet 17 g  17 g Oral DAILY    vancomycin (VANCOCIN) 1,250 mg in 0.9% sodium chloride 250 mL ([id number])  1,250 mg IntraVENous Q8H    furosemide (LASIX) injection 40 mg  40 mg IntraVENous DAILY    HYDROmorphone (DILAUDID) injection 1 mg  1 mg IntraVENous Q4H PRN    carvediloL (COREG) tablet 25 mg  25 mg Oral BID WITH MEALS    sodium chloride (NS) flush 5-40 mL  5-40 mL IntraVENous Q8H    sodium chloride (NS) flush 5-40 mL  5-40 mL IntraVENous PRN    acetaminophen (TYLENOL) tablet 650 mg  650 mg Oral Q6H PRN    Or    acetaminophen (TYLENOL) suppository 650 mg  650 mg Rectal Q6H PRN    ondansetron (ZOFRAN ODT) tablet 4 mg  4 mg Oral Q8H PRN    Or    ondansetron (ZOFRAN) injection 4 mg  4 mg IntraVENous Q6H PRN    enoxaparin (LOVENOX) injection 30 mg  30 mg SubCUTAneous Q12H    oxyCODONE-acetaminophen (PERCOCET 10)  mg per tablet 1 Tablet  1 Tablet Oral Q4H PRN    vancomycin - pharmacy to dose   Other Rx Dosing/Monitoring    hydrALAZINE (APRESOLINE) 20 mg/mL injection 10 mg  10 mg IntraVENous Q6H PRN     ______________________________________________________________________  EXPECTED LENGTH OF STAY: 3d 14h  ACTUAL LENGTH OF STAY:          5                 Volney Dubin, MD

## 2022-10-18 NOTE — PROGRESS NOTES
PULMONARY MEDICINE    Progress Note    Name: Puma Cardenas   : 1975   MRN: [id number]   Date: 10/18/2022      Subjective:   10/18  Chest film yesterday showed nothing acute   Was off O2 this am siting up and had saturations maintaining. No over dyspnea at rest, + dyspnea with ambulation     10/17  O2 requirements up but he feels ok  Intermittent compliant with NIV   ECHO from 10/14  Result status: Final result       Left Ventricle: Normal left ventricular systolic function with a visually estimated EF of 60 - 65%. Left ventricle size is normal. Mildly increased wall thickness. Normal wall motion. Normal diastolic function. 10/14  Significant apnea while when I entered the room. He continues to refuse his NIV  ECHO pending     Consult Note: 10/13  Requesting Physician: Dr. Marily He  Reason for consult: Acute on chronic hypercapnic respiratory failure    Medical records and data reviewed. Patient is a 56 y.o. male who presented to the hospital with bilateral leg pain, left more than right. This has been worsening for the past 7 days. This was not associated with fever. He was noted to be lethargic on arrival and ABG had suggested partially compensated hypercarbic respiratory failure with hypoxia. We are asked to see him in consultation. History is limited from the patient who is falling asleep in the middle of a conversation. When aroused he does say he is feeling a little better than the time of admission and shortness of breath improves in a recliner in weight he is right now. He managed to awaken enough to say that he has been diagnosed with sleep apnea in the past but has been noncompliant with CPAP. Review of medical records and the [facility] system shows that he underwent a diagnostic polysomnogram with apnea-hypopnea index of 40 events per hour has been followed by Dr. Jose Meyers and [address] [place] [address] sleep clinic. Last contact was in 2021.   He had been prescribed AutoPap at 5 to 15 cm of water pressure set up on December 2020. He also has had refractory pedal edema. Venous ultrasound on admission did not show DVT. Last echocardiogram was several years ago and had not shown pulmonary hypertension for which he has is at increased risk given untreated sleep apnea. In the hospital he has had poorly controlled systemic hypertension as well. He is receiving antibiotics for possible cellulitis. He has been a smoker in the past.  CT angiogram of the chest had not suggested presence of pulmonary pulm parenchymal or pleural abnormality, no pulmonary emboli was identified    Review of Systems:     A comprehensive 12 system review of systems was limited from the patient    Assessment:   Acute on chronic hypercarbic respiratory failure, respiratory acidosis is partially compensated  Untreated sleep apnea/obesity hypoventilation syndrome  Bilateral lower extremity edema, at risk for pulmonary hypertension  Lower extremity cellulitis  Uncontrolled systemic hypertension  Unknown FEV1  Other medical problems per chart    Recommendations:   BiPAP settings were adjusted-ordered for use with sleep and as needed during naps during the day  On diuretics  Receiving antibiotics  Recommend that he return to the sleep clinic with Dr. Donis Boas immediately after discharge to reestablish care. He will need compliance with recommended PAP therapy, tracheostomy or an Inspire by Dr Nilson Ruiz at [facility] are other options. O2 titration above 90%. Wean O2 as able  We will need to look into PFTs following discharge     Active Problem List:     Problem List  Date Reviewed: 2/22/2021            Codes Class    Acute respiratory failure with hypoxia and hypercapnia (HCC) ICD-10-CM: J96.01, J96.02  ICD-9-CM: 518.81         Cellulitis ICD-10-CM: L03.90  ICD-9-CM: 682.9        Past Medical History:      has a past medical history of Sleep apnea (2018). Past Surgical History:      has no past surgical history on file.     Home Medications: Prior to Admission medications    Medication Sig Start Date End Date Taking? Authorizing Provider   acetaminophen (TYLENOL) 325 mg tablet Take 2 Tablets by mouth every six (6) hours as needed for Fever. 10/4/22   Collette Current, PA   cetirizine (ZyrTEC) 10 mg tablet Take 1 Tablet by mouth daily. 10/4/22   Collette Current, PA   gabapentin (NEURONTIN) 600 mg tablet TAKE 1 CAPSULE FOR 2 DAYS, THEN 2 CAPS AT BEDTIME FOR 2 DAYS, THEN 1 IN MORNING AND 2 IN EVENING  Patient not taking: Reported on 10/4/2022 8/3/20   Provider, Historical   furosemide (LASIX) 40 mg tablet Take 1 Tab by mouth daily. Patient not taking: Reported on 10/4/2022 12/20/19   Do Gomes MD       Allergies/Social/Family History:     No Known Allergies   Social History     Tobacco Use    Smoking status: Former     Packs/day: 0.50     Years: 5.00     Pack years: 2.50     Types: Cigarettes    Smokeless tobacco: Never   Substance Use Topics    Alcohol use: No      Family History   Problem Relation Age of Onset    Diabetes Mother             Objective:   Vital Signs:  Visit Vitals  /88 (BP 1 Location: Left upper arm, BP Patient Position: Sitting)   Pulse 97   Temp 99 °F (37.2 °C)   Resp 15   Ht 5' 8\" (1.727 m)   Wt 150.6 kg (332 lb)   SpO2 93%   BMI 50.48 kg/m²    O2 Flow Rate (L/min): 6 l/min O2 Device: Nasal cannula Temp (24hrs), Av.6 °F (37 °C), Min:98.4 °F (36.9 °C), Max:99 °F (37.2 °C)           Intake/Output:     Intake/Output Summary (Last 24 hours) at 10/18/2022 1404  Last data filed at 10/18/2022 1220  Gross per 24 hour   Intake --   Output 750 ml   Net -750 ml         Physical Exam:   General:  Drowsy, no distress, appears stated age. Head:  Normocephalic, without obvious abnormality, atraumatic. Eyes:  Conjunctivae/corneas clear. PERRL   Neck: Supple, symmetrical, no adenopathy, no carotid bruit and no JVD. Lungs:   Diminished breath sounds   Chest wall:  No tenderness or deformity.    Heart:  Regular rate and rhythm, S1-S2 normal, no murmur, no click, rub or gallop. Abdomen:   Soft, non-tender. Bowel sounds present. No masses,  No organomegaly. Extremities: Atraumatic, no cyanosis, bilateral 4+ edema   Pulses: Palpable   Skin: No rashes or lesions   Neurologic: Grossly nonfocal        LABS AND  DATA: Personally reviewed  Recent Labs     10/18/22  0530 10/16/22  0405   WBC 8.1 12.2*   HGB 12.4 12.9   HCT 40.2 41.5    316       Recent Labs     10/18/22  0530 10/17/22  0453 10/16/22  0405    131* 133*   K 4.6 4.1 4.1   CL 99 96* 99   CO2 32 36* 30   BUN 11 13 12   CREA 1.00 1.28 1.10   GLU 96 99 92   CA 9.3 9.3 9.4   MG 2.5*  --  2.4   PHOS 3.9  --  3.8       Recent Labs     10/18/22  0530 10/16/22  0405   AP 56 62   TP 7.2 7.8   ALB 2.9* 2.9*   GLOB 4.3* 4.9*       No results for input(s): INR, PTP, APTT, INREXT, INREXT in the last 72 hours. No results for input(s): PHI, PCO2I, PO2I, FIO2I in the last 72 hours. No results for input(s): CPK, CKMB, TROIQ, BNPP in the last 72 hours.       MEDS: Reviewed  Current Facility-Administered Medications   Medication Dose Route Frequency    lidocaine-EPINEPHrine (XYLOCAINE) 2 %-1:100,000 injection 30 mg  1.5 mL SubCUTAneous ONCE    polyethylene glycol (MIRALAX) packet 17 g  17 g Oral DAILY    vancomycin (VANCOCIN) 1,250 mg in 0.9% sodium chloride 250 mL ([id number])  1,250 mg IntraVENous Q8H    furosemide (LASIX) injection 40 mg  40 mg IntraVENous DAILY    HYDROmorphone (DILAUDID) injection 1 mg  1 mg IntraVENous Q4H PRN    carvediloL (COREG) tablet 25 mg  25 mg Oral BID WITH MEALS    sodium chloride (NS) flush 5-40 mL  5-40 mL IntraVENous Q8H    sodium chloride (NS) flush 5-40 mL  5-40 mL IntraVENous PRN    acetaminophen (TYLENOL) tablet 650 mg  650 mg Oral Q6H PRN    Or    acetaminophen (TYLENOL) suppository 650 mg  650 mg Rectal Q6H PRN    ondansetron (ZOFRAN ODT) tablet 4 mg  4 mg Oral Q8H PRN    Or    ondansetron (ZOFRAN) injection 4 mg  4 mg IntraVENous Q6H PRN enoxaparin (LOVENOX) injection 30 mg  30 mg SubCUTAneous Q12H    oxyCODONE-acetaminophen (PERCOCET 10)  mg per tablet 1 Tablet  1 Tablet Oral Q4H PRN    vancomycin - pharmacy to dose   Other Rx Dosing/Monitoring    hydrALAZINE (APRESOLINE) 20 mg/mL injection 10 mg  10 mg IntraVENous Q6H PRN       Chest Imaging: personally reviewed and report checked  Results from Hospital Encounter encounter on 10/12/22    XR CHEST PORT    Narrative  EXAM:  XR CHEST PORT    INDICATION:   dyspnea    COMPARISON: Chest radiograph 10/12/2022. FINDINGS: AP radiograph of the chest was obtained. No evidence of focal consolidation. No pleural effusion or pneumothorax. Heart,  fannie, mediastinum are within normal limits. No acute osseous abnormalities. Impression  No acute cardiopulmonary process. Results from [place] encounter on 10/12/22    CT LOW EXT LT W CONT    Narrative  EXAM: CT LOW EXT LT W CONT    INDICATION: Left leg swelling, rule out abscess, compartment    COMPARISON: Left lower extremity venous duplex 7/15/2018    CONTRAST: 100 mL of Isovue-300. TECHNIQUE: Helical CT of the left lower extremity (from the distal femur through  the toes) during uneventful rapid bolus intravenous contrast administration. Coronal and Sagittal reformats were generated. Images reviewed in soft tissue  and bone windows. CT dose reduction was achieved through the use of a  standardized protocol tailored for this examination and automatic exposure  control for dose modulation. FINDINGS: Bones: Normal bone mineralization. No fracture, dislocation, or  periosteal reaction. Joint fluid: None. Articulations: Mild osteoarthritis involving the knee, ankle, and foot. Tendons: No full-thickness tendon tear. Muscles: No intramuscular hematoma. No focal atrophy. Soft tissues: Diffuse circumferential skin thickening and subcutaneous fat  stranding/edema and swelling in the lower leg, compatible with cellulitis. No  focal fluid collection to suggest abscess. 16 mm x 7 mm x 5 mm focus of nodular  cutaneous enhancement at the medial hindfoot (302-187, 304-208). Impression  Lower leg cellulitis. No abscess. Focal nodular cutaneous enhancing focus in the  medial hindfoot, correlate clinically. Tele- reviewed    Medical decision making:   I have reviewed the flowsheet and previous day's notes  Patient has acute or chronic illness that poses a threat to life or bodily function  Review and order of Clinical lab tests  Review and Order of Radiology tests  Independent visualization of Image  Reviewed Oxygen/ NiPPV  High Risk Drug therapy requiring intensive monitoring for toxicity: eg steroids, pressors, antibiotics        Thank you for allowing me to participate in this patient's care.     Sherlyn Miles PA-C      [facility]

## 2022-10-18 NOTE — WOUND CARE
WOCN Note:     New consult for I&D sites to LL. Seen with Shaunna Lin NP's at bedside. Chart shows:  Admitted on 10/12/22. Admitted for edema in legs. History of lymphedema & apnea. WBC = 8.1; on vancomycin & zosyn    Assessment:   A&O x 4 and reports pain in LLL with palpation. RN attempted to medicate but IV site is painful. PICC team arrived to obtain new site. Mobile and continent. Palpable DP pulse. Generalized edema to LLL. Dark skin tone so tissue color changes are not visible. [facility] has 3 I&D sites:  Lateral and proximal site is dry today with no fluctuance. Distal site is fluctuant and I&D site from yesterday has sealed itself off. Using plastic sheath of angiocath and saline, we were able to flush the wound. However, given the amount of trapped purulence, the area is likely to seal itself off again. Discussion with NP lead to plan for further I&D of this area to enable packing. Second visit to room for dressing post I&D site of left lower distal leg  3 x 0.2 x 2.2 cm  Red base; moderate amount of bleeding that slowed with pressure  Flushed well with saline and packed with Opticel AG with foam cover. Wound Recommendations:    LLL: 3 sites all dressed with Opticel AG and covered with foam.  Distal site requires packing. Discussed with RN & NP's.     Transition of Care: Plan to follow weekly and as needed while admitted to hospital.      LORENA BlancoN, RN, [facility]  Certified Wound, Ostomy, Continence Nurse  office [phone]  Available via [facility]

## 2022-10-19 LAB
ANION GAP SERPL CALC-SCNC: 5 MMOL/L (ref 5–15)
BACTERIA SPEC CULT: NORMAL
BASOPHILS # BLD: 0 K/UL (ref 0–0.1)
BASOPHILS NFR BLD: 1 % (ref 0–1)
BUN SERPL-MCNC: 15 MG/DL (ref 6–20)
BUN/CREAT SERPL: 15 (ref 12–20)
CALCIUM SERPL-MCNC: 9.3 MG/DL (ref 8.5–10.1)
CHLORIDE SERPL-SCNC: 98 MMOL/L (ref 97–108)
CO2 SERPL-SCNC: 31 MMOL/L (ref 21–32)
CREAT SERPL-MCNC: 1.01 MG/DL (ref 0.7–1.3)
DIFFERENTIAL METHOD BLD: ABNORMAL
EOSINOPHIL # BLD: 0.1 K/UL (ref 0–0.4)
EOSINOPHIL NFR BLD: 2 % (ref 0–7)
ERYTHROCYTE [DISTWIDTH] IN BLOOD BY AUTOMATED COUNT: 14 % (ref 11.5–14.5)
GLUCOSE SERPL-MCNC: 101 MG/DL (ref 65–100)
HCT VFR BLD AUTO: 37.9 % (ref 36.6–50.3)
HGB BLD-MCNC: 11.8 G/DL (ref 12.1–17)
IMM GRANULOCYTES # BLD AUTO: 0 K/UL (ref 0–0.04)
IMM GRANULOCYTES NFR BLD AUTO: 1 % (ref 0–0.5)
LYMPHOCYTES # BLD: 1 K/UL (ref 0.8–3.5)
LYMPHOCYTES NFR BLD: 13 % (ref 12–49)
MAGNESIUM SERPL-MCNC: 2.4 MG/DL (ref 1.6–2.4)
MCH RBC QN AUTO: 29.4 PG (ref 26–34)
MCHC RBC AUTO-ENTMCNC: 31.1 G/DL (ref 30–36.5)
MCV RBC AUTO: 94.3 FL (ref 80–99)
MONOCYTES # BLD: 0.9 K/UL (ref 0–1)
MONOCYTES NFR BLD: 12 % (ref 5–13)
NEUTS SEG # BLD: 5.6 K/UL (ref 1.8–8)
NEUTS SEG NFR BLD: 71 % (ref 32–75)
NRBC # BLD: 0 K/UL (ref 0–0.01)
NRBC BLD-RTO: 0 PER 100 WBC
PHOSPHATE SERPL-MCNC: 3.8 MG/DL (ref 2.6–4.7)
PLATELET # BLD AUTO: 318 K/UL (ref 150–400)
PMV BLD AUTO: 9.5 FL (ref 8.9–12.9)
POTASSIUM SERPL-SCNC: 4.4 MMOL/L (ref 3.5–5.1)
RBC # BLD AUTO: 4.02 M/UL (ref 4.1–5.7)
SERVICE CMNT-IMP: NORMAL
SODIUM SERPL-SCNC: 134 MMOL/L (ref 136–145)
WBC # BLD AUTO: 7.8 K/UL (ref 4.1–11.1)

## 2022-10-19 PROCEDURE — 74011250637 HC RX REV CODE- 250/637: Performed by: INTERNAL MEDICINE

## 2022-10-19 PROCEDURE — 36415 COLL VENOUS BLD VENIPUNCTURE: CPT

## 2022-10-19 PROCEDURE — 85025 COMPLETE CBC W/AUTO DIFF WBC: CPT

## 2022-10-19 PROCEDURE — 80048 BASIC METABOLIC PNL TOTAL CA: CPT

## 2022-10-19 PROCEDURE — 83735 ASSAY OF MAGNESIUM: CPT

## 2022-10-19 PROCEDURE — 65270000046 HC RM TELEMETRY

## 2022-10-19 PROCEDURE — 99024 POSTOP FOLLOW-UP VISIT: CPT | Performed by: NURSE PRACTITIONER

## 2022-10-19 PROCEDURE — 74011250636 HC RX REV CODE- 250/636: Performed by: FAMILY MEDICINE

## 2022-10-19 PROCEDURE — 74011000250 HC RX REV CODE- 250: Performed by: FAMILY MEDICINE

## 2022-10-19 PROCEDURE — 74011250636 HC RX REV CODE- 250/636: Performed by: HOSPITALIST

## 2022-10-19 PROCEDURE — 84100 ASSAY OF PHOSPHORUS: CPT

## 2022-10-19 PROCEDURE — 74011250637 HC RX REV CODE- 250/637: Performed by: HOSPITALIST

## 2022-10-19 RX ADMIN — VANCOMYCIN HYDROCHLORIDE 1250 MG: 1.25 INJECTION, POWDER, LYOPHILIZED, FOR SOLUTION INTRAVENOUS at 01:37

## 2022-10-19 RX ADMIN — VANCOMYCIN HYDROCHLORIDE 1250 MG: 1.25 INJECTION, POWDER, LYOPHILIZED, FOR SOLUTION INTRAVENOUS at 08:30

## 2022-10-19 RX ADMIN — ENOXAPARIN SODIUM 30 MG: 100 INJECTION SUBCUTANEOUS at 12:02

## 2022-10-19 RX ADMIN — OXYCODONE AND ACETAMINOPHEN 1 TABLET: 10; 325 TABLET ORAL at 13:16

## 2022-10-19 RX ADMIN — SODIUM CHLORIDE, PRESERVATIVE FREE 10 ML: 5 INJECTION INTRAVENOUS at 22:39

## 2022-10-19 RX ADMIN — SODIUM CHLORIDE, PRESERVATIVE FREE 10 ML: 5 INJECTION INTRAVENOUS at 16:38

## 2022-10-19 RX ADMIN — CARVEDILOL 25 MG: 12.5 TABLET, FILM COATED ORAL at 16:34

## 2022-10-19 RX ADMIN — HYDROMORPHONE HYDROCHLORIDE 1 MG: 1 INJECTION, SOLUTION INTRAMUSCULAR; INTRAVENOUS; SUBCUTANEOUS at 22:39

## 2022-10-19 RX ADMIN — VANCOMYCIN HYDROCHLORIDE 1250 MG: 1.25 INJECTION, POWDER, LYOPHILIZED, FOR SOLUTION INTRAVENOUS at 16:34

## 2022-10-19 RX ADMIN — OXYCODONE AND ACETAMINOPHEN 1 TABLET: 10; 325 TABLET ORAL at 19:12

## 2022-10-19 RX ADMIN — OXYCODONE AND ACETAMINOPHEN 1 TABLET: 10; 325 TABLET ORAL at 08:30

## 2022-10-19 RX ADMIN — FUROSEMIDE 40 MG: 10 INJECTION, SOLUTION INTRAVENOUS at 08:29

## 2022-10-19 RX ADMIN — HYDROMORPHONE HYDROCHLORIDE 1 MG: 1 INJECTION, SOLUTION INTRAMUSCULAR; INTRAVENOUS; SUBCUTANEOUS at 11:55

## 2022-10-19 RX ADMIN — CARVEDILOL 25 MG: 12.5 TABLET, FILM COATED ORAL at 08:30

## 2022-10-19 RX ADMIN — HYDROMORPHONE HYDROCHLORIDE 1 MG: 1 INJECTION, SOLUTION INTRAMUSCULAR; INTRAVENOUS; SUBCUTANEOUS at 16:34

## 2022-10-19 RX ADMIN — POLYETHYLENE GLYCOL 3350 17 G: 17 POWDER, FOR SOLUTION ORAL at 08:30

## 2022-10-19 RX ADMIN — HYDROMORPHONE HYDROCHLORIDE 1 MG: 1 INJECTION, SOLUTION INTRAMUSCULAR; INTRAVENOUS; SUBCUTANEOUS at 05:53

## 2022-10-19 RX ADMIN — LORAZEPAM 1 MG: 1 TABLET ORAL at 22:39

## 2022-10-19 RX ADMIN — ENOXAPARIN SODIUM 30 MG: 100 INJECTION SUBCUTANEOUS at 22:39

## 2022-10-19 NOTE — PROGRESS NOTES
Problem: Falls - Risk of  Goal: *Absence of Falls  Description: Document Salomon Garcia Fall Risk and appropriate interventions in the flowsheet.   Outcome: Progressing Towards Goal  Note: Fall Risk Interventions:  Mobility Interventions: Bed/chair exit alarm, Patient to call before getting OOB, Utilize walker, cane, or other assistive device, Strengthening exercises (ROM-active/passive), Communicate number of staff needed for ambulation/transfer         Medication Interventions: Bed/chair exit alarm, Patient to call before getting OOB, Teach patient to arise slowly         History of Falls Interventions: Door open when patient unattended, Investigate reason for fall         Problem: Patient Education: Go to Patient Education Activity  Goal: Patient/Family Education  Outcome: Progressing Towards Goal

## 2022-10-19 NOTE — PROGRESS NOTES
PULMONARY MEDICINE    Progress Note    Name: Dale Frias   : 1975   MRN: [id number]   Date: 10/19/2022      Subjective:     10/19  Breathing stable today    Latest Reference Range & Units 10/13/22 05:11   pH (POC) 7.35 - 7.45   7.33 (L)   pCO2 (POC) 35.0 - 45.0 MMHG 57.7 (H)   pO2 (POC) 80 - 100 MMHG 148 (H)   HCO3 (POC) 22 - 26 MMOL/L 30.3 (H)   sO2 (POC) 92 - 97 % 99.1 (H)   Base excess (POC) mmol/L 2.8   FIO2 (POC) % 40   Specimen type (POC) -   ARTERIAL   (L): Data is abnormally low  (H): Data is abnormally high    10/18  Chest film yesterday showed nothing acute   Was off O2 this am siting up and had saturations maintaining. No over dyspnea at rest, + dyspnea with ambulation     10/17  O2 requirements up but he feels ok  Intermittent compliant with NIV   ECHO from 10/14  Result status: Final result       Left Ventricle: Normal left ventricular systolic function with a visually estimated EF of 60 - 65%. Left ventricle size is normal. Mildly increased wall thickness. Normal wall motion. Normal diastolic function. 10/14  Significant apnea while when I entered the room. He continues to refuse his NIV  ECHO pending     Consult Note: 10/13  Requesting Physician: Dr. Augustine Gonzalez  Reason for consult: Acute on chronic hypercapnic respiratory failure    Medical records and data reviewed. Patient is a 56 y.o. male who presented to the hospital with bilateral leg pain, left more than right. This has been worsening for the past 7 days. This was not associated with fever. He was noted to be lethargic on arrival and ABG had suggested partially compensated hypercarbic respiratory failure with hypoxia. We are asked to see him in consultation. History is limited from the patient who is falling asleep in the middle of a conversation. When aroused he does say he is feeling a little better than the time of admission and shortness of breath improves in a recliner in weight he is right now.   He managed to awaken enough to say that he has been diagnosed with sleep apnea in the past but has been noncompliant with CPAP. Review of medical records and the [facility] system shows that he underwent a diagnostic polysomnogram with apnea-hypopnea index of 40 events per hour has been followed by Dr. Peng Lara and [facility] sleep clinic. Last contact was in February 2021. He had been prescribed AutoPap at 5 to 15 cm of water pressure set up on December 2020. He also has had refractory pedal edema. Venous ultrasound on admission did not show DVT. Last echocardiogram was several years ago and had not shown pulmonary hypertension for which he has is at increased risk given untreated sleep apnea. In the hospital he has had poorly controlled systemic hypertension as well. He is receiving antibiotics for possible cellulitis. He has been a smoker in the past.  CT angiogram of the chest had not suggested presence of pulmonary pulm parenchymal or pleural abnormality, no pulmonary emboli was identified    Review of Systems:     A comprehensive 12 system review of systems was limited from the patient    Assessment:   Acute on chronic hypercarbic respiratory failure, respiratory acidosis is partially compensated  Restrictive lung disease and obesity hypoventilation syndrome  Bilateral lower extremity edema, at risk for pulmonary hypertension  Lower extremity cellulitis  THC smoker   Uncontrolled systemic hypertension  Unknown FEV1  Other medical problems per chart    Recommendations:   He has difficulty tolerating BiPAP (including S/T) and CPAP is not indicated in the treatment for hypercapnia. We had a long discussed today of the importance of NIV . He is in agreement to do something.  He does not want to follow up with  sleep clinic so we will set him up on trilogy in the form of AVAPS-AE, the preferred choice of ventilation for the chronic management of hypercapnia  Settings:   PSmax: 46gpn0p   PSmin: 4cmh2o   EPAPmax: 56xdq2r   EPAPmin: 6cmh2o   Auto back up rate. On diuretics  Receiving antibiotics  O2 titration above 90%. Wean O2 as able  We will need to look into PFTs following discharge     Active Problem List:     Problem List  Date Reviewed: 2021            Codes Class    Acute respiratory failure with hypoxia and hypercapnia (HCC) ICD-10-CM: J96.01, J96.02  ICD-9-CM: 518.81         Cellulitis ICD-10-CM: L03.90  ICD-9-CM: 682.9        Past Medical History:      has a past medical history of Sleep apnea (2018). Past Surgical History:      has no past surgical history on file. Home Medications:     Prior to Admission medications    Medication Sig Start Date End Date Taking? Authorizing Provider   acetaminophen (TYLENOL) 325 mg tablet Take 2 Tablets by mouth every six (6) hours as needed for Fever. 10/4/22   MARLENE Vera   cetirizine (ZyrTEC) 10 mg tablet Take 1 Tablet by mouth daily. 10/4/22   MARLENE Vera   gabapentin (NEURONTIN) 600 mg tablet TAKE 1 CAPSULE FOR 2 DAYS, THEN 2 CAPS AT BEDTIME FOR 2 DAYS, THEN 1 IN MORNING AND 2 IN EVENING  Patient not taking: Reported on 10/4/2022 8/3/20   Provider, Historical   furosemide (LASIX) 40 mg tablet Take 1 Tab by mouth daily.   Patient not taking: Reported on 10/4/2022 12/20/19   Kimo Brewer MD       Allergies/Social/Family History:     No Known Allergies   Social History     Tobacco Use    Smoking status: Former     Packs/day: 0.50     Years: 5.00     Pack years: 2.50     Types: Cigarettes    Smokeless tobacco: Never   Substance Use Topics    Alcohol use: No      Family History   Problem Relation Age of Onset    Diabetes Mother             Objective:   Vital Signs:  Visit Vitals  BP (!) 140/85   Pulse 89   Temp 98.8 °F (37.1 °C)   Resp 11   Ht 5' 8\" (1.727 m)   Wt 150.6 kg (332 lb)   SpO2 94%   BMI 50.48 kg/m²    O2 Flow Rate (L/min): 6 l/min O2 Device: None (Room air) Temp (24hrs), Av.7 °F (37.1 °C), Min:98 °F (36.7 °C), Max:99.2 °F (37.3 °C)           Intake/Output:     Intake/Output Summary (Last 24 hours) at 10/19/2022 1113  Last data filed at 10/19/2022 0553  Gross per 24 hour   Intake --   Output 2350 ml   Net -2350 ml         Physical Exam:   General:  Drowsy, no distress, appears stated age. Head:  Normocephalic, without obvious abnormality, atraumatic. Eyes:  Conjunctivae/corneas clear. PERRL   Neck: Supple, symmetrical, no adenopathy, no carotid bruit and no JVD. Lungs:   Diminished breath sounds   Chest wall:  No tenderness or deformity. Heart:  Regular rate and rhythm, S1-S2 normal, no murmur, no click, rub or gallop. Abdomen:   Soft, non-tender. Bowel sounds present. No masses,  No organomegaly. Extremities: Atraumatic, no cyanosis, bilateral 4+ edema   Pulses: Palpable   Skin: No rashes or lesions   Neurologic: Grossly nonfocal        LABS AND  DATA: Personally reviewed  Recent Labs     10/19/22  0613 10/18/22  0530   WBC 7.8 8.1   HGB 11.8* 12.4   HCT 37.9 40.2    326       Recent Labs     10/19/22  0613 10/18/22  0530   * 136   K 4.4 4.6   CL 98 99   CO2 31 32   BUN 15 11   CREA 1.01 1.00   * 96   CA 9.3 9.3   MG 2.4 2.5*   PHOS 3.8 3.9       Recent Labs     10/18/22  0530   AP 56   TP 7.2   ALB 2.9*   GLOB 4.3*       No results for input(s): INR, PTP, APTT, INREXT, INREXT in the last 72 hours. No results for input(s): PHI, PCO2I, PO2I, FIO2I in the last 72 hours. No results for input(s): CPK, CKMB, TROIQ, BNPP in the last 72 hours.       MEDS: Reviewed  Current Facility-Administered Medications   Medication Dose Route Frequency    LORazepam (ATIVAN) tablet 1 mg  1 mg Oral QHS PRN    polyethylene glycol (MIRALAX) packet 17 g  17 g Oral DAILY    vancomycin (VANCOCIN) 1,250 mg in 0.9% sodium chloride 250 mL ([id number])  1,250 mg IntraVENous Q8H    furosemide (LASIX) injection 40 mg  40 mg IntraVENous DAILY    HYDROmorphone (DILAUDID) injection 1 mg  1 mg IntraVENous Q4H PRN    carvediloL (COREG) tablet 25 mg  25 mg Oral BID WITH MEALS    sodium chloride (NS) flush 5-40 mL  5-40 mL IntraVENous Q8H    sodium chloride (NS) flush 5-40 mL  5-40 mL IntraVENous PRN    acetaminophen (TYLENOL) tablet 650 mg  650 mg Oral Q6H PRN    Or    acetaminophen (TYLENOL) suppository 650 mg  650 mg Rectal Q6H PRN    ondansetron (ZOFRAN ODT) tablet 4 mg  4 mg Oral Q8H PRN    Or    ondansetron (ZOFRAN) injection 4 mg  4 mg IntraVENous Q6H PRN    enoxaparin (LOVENOX) injection 30 mg  30 mg SubCUTAneous Q12H    oxyCODONE-acetaminophen (PERCOCET 10)  mg per tablet 1 Tablet  1 Tablet Oral Q4H PRN    vancomycin - pharmacy to dose   Other Rx Dosing/Monitoring    hydrALAZINE (APRESOLINE) 20 mg/mL injection 10 mg  10 mg IntraVENous Q6H PRN       Chest Imaging: personally reviewed and report checked  Results from Hospital Encounter encounter on 10/12/22    XR CHEST PORT    Narrative  EXAM:  XR CHEST PORT    INDICATION:   dyspnea    COMPARISON: Chest radiograph 10/12/2022. FINDINGS: AP radiograph of the chest was obtained. No evidence of focal consolidation. No pleural effusion or pneumothorax. Heart,  fannie, mediastinum are within normal limits. No acute osseous abnormalities. Impression  No acute cardiopulmonary process. Results from [place] encounter on 10/12/22    CT LOW EXT LT W CONT    Narrative  EXAM: CT LOW EXT LT W CONT    INDICATION: Left leg swelling, rule out abscess, compartment    COMPARISON: Left lower extremity venous duplex 7/15/2018    CONTRAST: 100 mL of Isovue-300. TECHNIQUE: Helical CT of the left lower extremity (from the distal femur through  the toes) during uneventful rapid bolus intravenous contrast administration. Coronal and Sagittal reformats were generated. Images reviewed in soft tissue  and bone windows. CT dose reduction was achieved through the use of a  standardized protocol tailored for this examination and automatic exposure  control for dose modulation.     FINDINGS: Bones: Normal bone mineralization. No fracture, dislocation, or  periosteal reaction. Joint fluid: None. Articulations: Mild osteoarthritis involving the knee, ankle, and foot. Tendons: No full-thickness tendon tear. Muscles: No intramuscular hematoma. No focal atrophy. Soft tissues: Diffuse circumferential skin thickening and subcutaneous fat  stranding/edema and swelling in the lower leg, compatible with cellulitis. No  focal fluid collection to suggest abscess. 16 mm x 7 mm x 5 mm focus of nodular  cutaneous enhancement at the medial hindfoot (302-187, 304-208). Impression  Lower leg cellulitis. No abscess. Focal nodular cutaneous enhancing focus in the  medial hindfoot, correlate clinically. Tele- reviewed    Medical decision making:   I have reviewed the flowsheet and previous day's notes  Patient has acute or chronic illness that poses a threat to life or bodily function  Review and order of Clinical lab tests  Review and Order of Radiology tests  Independent visualization of Image  Reviewed Oxygen/ NiPPV  High Risk Drug therapy requiring intensive monitoring for toxicity: eg steroids, pressors, antibiotics        Thank you for allowing me to participate in this patient's care.     Lazaro Parada PA-C      [facility]

## 2022-10-19 NOTE — PROGRESS NOTES
Problem: Falls - Risk of  Goal: *Absence of Falls  Description: Document Sachin Clay Fall Risk and appropriate interventions in the flowsheet.   Outcome: Progressing Towards Goal  Note: Fall Risk Interventions:  Mobility Interventions: Bed/chair exit alarm, Patient to call before getting OOB, Utilize walker, cane, or other assistive device, Strengthening exercises (ROM-active/passive), Communicate number of staff needed for ambulation/transfer         Medication Interventions: Bed/chair exit alarm, Patient to call before getting OOB, Teach patient to arise slowly         History of Falls Interventions: Door open when patient unattended, Investigate reason for fall         Problem: Patient Education: Go to Patient Education Activity  Goal: Patient/Family Education  Outcome: Progressing Towards Goal

## 2022-10-19 NOTE — PROGRESS NOTES
0800: Bedside and Verbal shift change report given to Noah RN (oncoming nurse) by Johnnie Moraes RN (offgoing nurse). Report included the following information SBAR, Kardex, Procedure Summary, Recent Results, and Cardiac Rhythm SR/ST . 2000: Bedside and Verbal shift change report given to Lesli Gutierrez RN (oncoming nurse) by Chay Amato RN (offgoing nurse). Report included the following information SBAR, Kardex, Procedure Summary, Recent Results, and Cardiac Rhythm SR/ST.

## 2022-10-19 NOTE — PROGRESS NOTES
[address] [facility] Adult  Hospitalist Group                                                                                          Hospitalist Progress Note  Annalise Sylvester MD  Answering service: [phone] from in house phone        Date of Service:  10/19/2022  NAME:  Cornelia Fortune  :  1975  MRN:  [id number]      Admission Summary:   Cornelia Fortune is a 56 y.o. male  with past medical history of obstructive sleep apnea, class III obesity, lymphedema presented to the emergency department via private transport from home with reported initial chief complaint of shortness of breath and leg swelling. on admission the patient is obtunded and not providing history. As such majority of history was obtained per review of ED and electronic medical records. Per ER triage note, patient arrived awake alert oriented x4. He reportedly had chronic leg edema and swelling over the past year. Leg swelling and shortness of breath notably worsened over the past prior 3 days. He initially went to [facility] urgent care center a few days ago and has been given antibiotics for unknown diagnosis. On arrival emergency department tonight, initial recorded vital signs temperature 97.7 °F, /95, heart rate 108, respirate 18, O2 saturation 95% on room air. Per chart records patient was placed on 2 L, followed by 5 L of O2 via nasal cannula and then a hundredths and nonrebreather. Venous blood gas showed pH 7.32, PCO2 55.4, PO2 66, HCO3 28.2, base excess 0.8, SaO2 90.2%. CTA chest with IV contrast was negative for PE and showed no acute process. 12 EKG shows sinus tachycardia, right bundle branch block, left anterior fascicular block 102 bpm.  ED requested admission to the hospitalist service.   However patient was requiring a hundredths and nonrebreather facemask and high risk of deterioration, I request the ED consult with ICU intensivist.  On arrival at the patient's bedside, patient was on CPAP with O2 saturation 99%. As such, orders were placed for admission to the hospitalist service. Interval history / Subjective:   S/p I&D 10/17, 10/18   Feels a new painfull induration developed at the posterior part of leg          Assessment & Plan:      Acute on chronic hypercarbic respiratory failure, respiratory acidosis is partially compensated  Untreated sleep apnea/obesity hypoventilation syndrome  Pulmonologist consulted  Continue Bipap HS and prn at nap time  Recommend that he return to the sleep clinic with Dr. Jacolyn Goldmann immediately after discharge to reestablish care  He will need compliance with recommended PAP therapy, tracheostomy is another option: pulmonologist is arranging trilogy for pt. Diuresis as long cr tolerate   Lasix 40mg iv daily now     Acute left leg cellulitis:  Stat leg CT no abscess, no acute compartment, check CPK   Continue iv vanco and zosyn for now  WBC worse   Still quite swelling/tender consult general surgeon for the concerning of compartment. dorsalis pulse palpable. No right foot numbness for now   General surgeon and ortho consulted : no surgery for now  ordered left leg US to see if any new abscess collection :3.3 cm x 2.8 cm x 1.5 cm and 2.9 cm x 2.9 cm x 1.6 cm complex fluid collections  with extensive internal echogenic debris and peripheral vascularity in the  subcutaneous soft tissue  Pt will need I&D: this was discussed with general surgeon DR. Sexton 10/16   I&d 10/17 with general surgeon  Pt has 3 abscesses on L leg: lateral  and posterior calf draining bloody drainage,  Anterior distal shin fluctuant and tender small opening with scan amt bloody drainage: repeat I&D 10/18    -dc zosyn 10/18  Continue iv vanco   Wound care  -d/w surgery, may need repeat I&D       Bilateral lower extremity edema/lymphedema   Prn diuresis   Keep legs elevated      Uncontrolled systemic hypertension    Acute metabolic encephalopathy/Altered mental status  -due to hypercapnia.   As such change settings to BiPAP and correct respiratory acidosis  -resolved now       Tachycardia   -continue telemetry monitoring  -resolved      Class 3 obesity  -BMI 49.26 kg/m2  -would urge eventual weight loss, heart healthy diet, lifestyle modification       Code status: full   Prophylaxis: sc lovenox   Care Plan discussed with: pt, cm, rn,   Anticipated Disposition: TBD, home if no further new abscess developed. Hospital Problems  Date Reviewed: 2/22/2021            Codes Class Noted POA    Acute respiratory failure with hypoxia and hypercapnia [facility]) ICD-10-CM: J96.01, J96.02  ICD-9-CM: 518.81  10/13/2022 Unknown       Review of Systems:   A comprehensive review of systems was negative except for that written in the HPI. Vital Signs:    Last 24hrs VS reviewed since prior progress note. Most recent are:  Visit Vitals  /66   Pulse 98   Temp 98.8 °F (37.1 °C)   Resp 13   Ht 5' 8\" (1.727 m)   Wt 150.6 kg (332 lb)   SpO2 92%   BMI 50.48 kg/m²         Intake/Output Summary (Last 24 hours) at 10/19/2022 1255  Last data filed at 10/19/2022 0553  Gross per 24 hour   Intake --   Output 2100 ml   Net -2100 ml          Physical Examination:     I had a face to face encounter with this patient and independently examined them on 10/19/2022 as outlined below:          Constitutional:  No acute distress, cooperative, pleasant    ENT:  Oral mucosa moist, oropharynx benign. Resp:  CTA bilaterally. No wheezing/rhonchi/rales. No accessory muscle use. CV:  Regular rhythm, normal rate, no murmurs, gallops, rubs    GI:  Soft, non distended, non tender. normoactive bowel sounds, no hepatosplenomegaly     Musculoskeletal:  Left leg swelling, dressing on 3 open wound, small induration noticed at posterior leg. Neurologic:  Moves all extremities.   AAOx3, CN II-XII reviewed            Data Review:    Review and/or order of clinical lab test      Labs:     Recent Labs     10/19/22  0613 10/18/22  0530   WBC 7.8 8.1 HGB 11.8* 12.4   HCT 37.9 40.2    326       Recent Labs     10/19/22  0613 10/18/22  0530 10/17/22  0453   * 136 131*   K 4.4 4.6 4.1   CL 98 99 96*   CO2 31 32 36*   BUN 15 11 13   CREA 1.01 1.00 1.28   * 96 99   CA 9.3 9.3 9.3   MG 2.4 2.5*  --    PHOS 3.8 3.9  --        Recent Labs     10/18/22  0530   ALT 25   AP 56   TBILI 0.4   TP 7.2   ALB 2.9*   GLOB 4.3*       No results for input(s): INR, PTP, APTT, INREXT, INREXT in the last 72 hours. No results for input(s): FE, TIBC, PSAT, FERR in the last 72 hours. No results found for: FOL, RBCF   No results for input(s): PH, PCO2, PO2 in the last 72 hours. No results for input(s): CPK, CKNDX, TROIQ in the last 72 hours.     No lab exists for component: CPKMB    Lab Results   Component Value Date/Time    Cholesterol, total 147 05/07/2013 12:49 PM    HDL Cholesterol 37 (L) 05/07/2013 12:49 PM    LDL, calculated 98 05/07/2013 12:49 PM    Triglyceride 62 05/07/2013 12:49 PM     No results found for: [facility]  Lab Results   Component Value Date/Time    Color YELLOW/STRAW 07/01/2018 11:04 PM    Appearance CLEAR 07/01/2018 11:04 PM    Specific gravity 1.026 07/01/2018 11:04 PM    pH (UA) 5.5 07/01/2018 11:04 PM    Protein NEGATIVE 07/01/2018 11:04 PM    Glucose NEGATIVE 07/01/2018 11:04 PM    Ketone NEGATIVE 07/01/2018 11:04 PM    Bilirubin NEGATIVE 07/01/2018 11:04 PM    Urobilinogen 1.0 07/01/2018 11:04 PM    Nitrites NEGATIVE 07/01/2018 11:04 PM    Leukocyte Esterase NEGATIVE 07/01/2018 11:04 PM    Epithelial cells FEW 07/01/2018 11:04 PM    Bacteria NEGATIVE 07/01/2018 11:04 PM    WBC 0-4 07/01/2018 11:04 PM    RBC 0-5 07/01/2018 11:04 PM         Medications Reviewed:     Current Facility-Administered Medications   Medication Dose Route Frequency    LORazepam (ATIVAN) tablet 1 mg  1 mg Oral QHS PRN    polyethylene glycol (MIRALAX) packet 17 g  17 g Oral DAILY    vancomycin (VANCOCIN) 1,250 mg in 0.9% sodium chloride 250 mL ([id number])  1,250 mg IntraVENous Q8H    furosemide (LASIX) injection 40 mg  40 mg IntraVENous DAILY    HYDROmorphone (DILAUDID) injection 1 mg  1 mg IntraVENous Q4H PRN    carvediloL (COREG) tablet 25 mg  25 mg Oral BID WITH MEALS    sodium chloride (NS) flush 5-40 mL  5-40 mL IntraVENous Q8H    sodium chloride (NS) flush 5-40 mL  5-40 mL IntraVENous PRN    acetaminophen (TYLENOL) tablet 650 mg  650 mg Oral Q6H PRN    Or    acetaminophen (TYLENOL) suppository 650 mg  650 mg Rectal Q6H PRN    ondansetron (ZOFRAN ODT) tablet 4 mg  4 mg Oral Q8H PRN    Or    ondansetron (ZOFRAN) injection 4 mg  4 mg IntraVENous Q6H PRN    enoxaparin (LOVENOX) injection 30 mg  30 mg SubCUTAneous Q12H    oxyCODONE-acetaminophen (PERCOCET 10)  mg per tablet 1 Tablet  1 Tablet Oral Q4H PRN    vancomycin - pharmacy to dose   Other Rx Dosing/Monitoring    hydrALAZINE (APRESOLINE) 20 mg/mL injection 10 mg  10 mg IntraVENous Q6H PRN     ______________________________________________________________________  EXPECTED LENGTH OF STAY: 3d 14h  ACTUAL LENGTH OF STAY:          6                 Dana Jean MD

## 2022-10-19 NOTE — PROGRESS NOTES
General Surgery Daily Progress Note    Admit Date: 10/12/2022  Post-Operative Day: * No surgery found * from * No surgery found *     Subjective:     Last 24 hrs: pt denies L leg pain; on vanc, nl WBC, no fevers       Objective:     Blood pressure (!) 140/85, pulse 92, temperature 98.8 °F (37.1 °C), resp. rate 11, height 5' 8\" (1.727 m), weight 332 lb (150.6 kg), SpO2 94 %. Temp (24hrs), Av.7 °F (37.1 °C), Min:98 °F (36.7 °C), Max:99.2 °F (37.3 °C)      _____________________  Physical Exam:     Alert and Oriented, x3, in no acute distress. Cardiovascular: RRR, no peripheral edema  LLE w/ drsgs intact; anterior abscess w/ some bleeding; soft, not too tender      Assessment:   Active Problems:    Acute respiratory failure with hypoxia and hypercapnia (HCC) (10/13/2022)            Plan:     Cont LWC  Cont abx  Further plans per pulmonary and hospitalist    Data Review:    Recent Labs     10/19/22  0613 10/18/22  0530   WBC 7.8 8.1   HGB 11.8* 12.4   HCT 37.9 40.2    326     Recent Labs     10/19/22  0613 10/18/22  0530 10/17/22  0453   * 136 131*   K 4.4 4.6 4.1   CL 98 99 96*   CO2 31 32 36*   * 96 99   BUN 15 11 13   CREA 1.01 1.00 1.28   CA 9.3 9.3 9.3   MG 2.4 2.5*  --    PHOS 3.8 3.9  --    ALB  --  2.9*  --    ALT  --  25  --      No results for input(s): AML, LPSE in the last 72 hours.         ______________________  Medications:    Current Facility-Administered Medications   Medication Dose Route Frequency    LORazepam (ATIVAN) tablet 1 mg  1 mg Oral QHS PRN    polyethylene glycol (MIRALAX) packet 17 g  17 g Oral DAILY    vancomycin (VANCOCIN) 1,250 mg in 0.9% sodium chloride 250 mL ([id number])  1,250 mg IntraVENous Q8H    furosemide (LASIX) injection 40 mg  40 mg IntraVENous DAILY    HYDROmorphone (DILAUDID) injection 1 mg  1 mg IntraVENous Q4H PRN    carvediloL (COREG) tablet 25 mg  25 mg Oral BID WITH MEALS    sodium chloride (NS) flush 5-40 mL  5-40 mL IntraVENous Q8H    sodium chloride (NS) flush 5-40 mL  5-40 mL IntraVENous PRN    acetaminophen (TYLENOL) tablet 650 mg  650 mg Oral Q6H PRN    Or    acetaminophen (TYLENOL) suppository 650 mg  650 mg Rectal Q6H PRN    ondansetron (ZOFRAN ODT) tablet 4 mg  4 mg Oral Q8H PRN    Or    ondansetron (ZOFRAN) injection 4 mg  4 mg IntraVENous Q6H PRN    enoxaparin (LOVENOX) injection 30 mg  30 mg SubCUTAneous Q12H    oxyCODONE-acetaminophen (PERCOCET 10)  mg per tablet 1 Tablet  1 Tablet Oral Q4H PRN    vancomycin - pharmacy to dose   Other Rx Dosing/Monitoring    hydrALAZINE (APRESOLINE) 20 mg/mL injection 10 mg  10 mg IntraVENous Q6H PRN       Lorri Pickard NP  10/19/2022

## 2022-10-20 ENCOUNTER — APPOINTMENT (OUTPATIENT)
Dept: ULTRASOUND IMAGING | Age: 47
DRG: 951 | End: 2022-10-20
Attending: FAMILY MEDICINE
Payer: MEDICAID

## 2022-10-20 ENCOUNTER — APPOINTMENT (OUTPATIENT)
Dept: VASCULAR SURGERY | Age: 47
DRG: 951 | End: 2022-10-20
Attending: FAMILY MEDICINE
Payer: MEDICAID

## 2022-10-20 LAB
ANION GAP SERPL CALC-SCNC: 2 MMOL/L (ref 5–15)
BUN SERPL-MCNC: 13 MG/DL (ref 6–20)
BUN/CREAT SERPL: 11 (ref 12–20)
CALCIUM SERPL-MCNC: 9.4 MG/DL (ref 8.5–10.1)
CHLORIDE SERPL-SCNC: 99 MMOL/L (ref 97–108)
CO2 SERPL-SCNC: 33 MMOL/L (ref 21–32)
CREAT SERPL-MCNC: 1.15 MG/DL (ref 0.7–1.3)
GLUCOSE SERPL-MCNC: 101 MG/DL (ref 65–100)
POTASSIUM SERPL-SCNC: 4.4 MMOL/L (ref 3.5–5.1)
SODIUM SERPL-SCNC: 134 MMOL/L (ref 136–145)
VANCOMYCIN SERPL-MCNC: 21.6 UG/ML

## 2022-10-20 PROCEDURE — 77030041076 HC DRSG AG OPTICELL MDII -A

## 2022-10-20 PROCEDURE — 36415 COLL VENOUS BLD VENIPUNCTURE: CPT

## 2022-10-20 PROCEDURE — 77030037877 HC DRSG MEPILEX >48IN BORD MOLN -A

## 2022-10-20 PROCEDURE — 74011250637 HC RX REV CODE- 250/637: Performed by: HOSPITALIST

## 2022-10-20 PROCEDURE — 74011000250 HC RX REV CODE- 250: Performed by: FAMILY MEDICINE

## 2022-10-20 PROCEDURE — 74011250636 HC RX REV CODE- 250/636: Performed by: FAMILY MEDICINE

## 2022-10-20 PROCEDURE — 93971 EXTREMITY STUDY: CPT

## 2022-10-20 PROCEDURE — 80202 ASSAY OF VANCOMYCIN: CPT

## 2022-10-20 PROCEDURE — 80048 BASIC METABOLIC PNL TOTAL CA: CPT

## 2022-10-20 PROCEDURE — 99222 1ST HOSP IP/OBS MODERATE 55: CPT | Performed by: INTERNAL MEDICINE

## 2022-10-20 PROCEDURE — 76882 US LMTD JT/FCL EVL NVASC XTR: CPT

## 2022-10-20 PROCEDURE — 65270000046 HC RM TELEMETRY

## 2022-10-20 PROCEDURE — 99024 POSTOP FOLLOW-UP VISIT: CPT

## 2022-10-20 PROCEDURE — 74011250636 HC RX REV CODE- 250/636: Performed by: HOSPITALIST

## 2022-10-20 RX ORDER — ENOXAPARIN SODIUM 150 MG/ML
1 INJECTION SUBCUTANEOUS EVERY 12 HOURS
Status: DISCONTINUED | OUTPATIENT
Start: 2022-10-20 | End: 2022-10-21 | Stop reason: HOSPADM

## 2022-10-20 RX ADMIN — OXYCODONE AND ACETAMINOPHEN 1 TABLET: 10; 325 TABLET ORAL at 05:08

## 2022-10-20 RX ADMIN — SODIUM CHLORIDE, PRESERVATIVE FREE 10 ML: 5 INJECTION INTRAVENOUS at 07:03

## 2022-10-20 RX ADMIN — SODIUM CHLORIDE, PRESERVATIVE FREE 10 ML: 5 INJECTION INTRAVENOUS at 18:02

## 2022-10-20 RX ADMIN — CARVEDILOL 25 MG: 12.5 TABLET, FILM COATED ORAL at 17:59

## 2022-10-20 RX ADMIN — ENOXAPARIN SODIUM 140 MG: 150 INJECTION SUBCUTANEOUS at 18:00

## 2022-10-20 RX ADMIN — CARVEDILOL 25 MG: 12.5 TABLET, FILM COATED ORAL at 09:42

## 2022-10-20 RX ADMIN — VANCOMYCIN HYDROCHLORIDE 1250 MG: 1.25 INJECTION, POWDER, LYOPHILIZED, FOR SOLUTION INTRAVENOUS at 09:42

## 2022-10-20 RX ADMIN — ENOXAPARIN SODIUM 30 MG: 100 INJECTION SUBCUTANEOUS at 09:44

## 2022-10-20 RX ADMIN — OXYCODONE AND ACETAMINOPHEN 1 TABLET: 10; 325 TABLET ORAL at 17:59

## 2022-10-20 RX ADMIN — FUROSEMIDE 40 MG: 10 INJECTION, SOLUTION INTRAVENOUS at 09:42

## 2022-10-20 RX ADMIN — VANCOMYCIN HYDROCHLORIDE 1250 MG: 1.25 INJECTION, POWDER, LYOPHILIZED, FOR SOLUTION INTRAVENOUS at 18:01

## 2022-10-20 RX ADMIN — VANCOMYCIN HYDROCHLORIDE 1250 MG: 1.25 INJECTION, POWDER, LYOPHILIZED, FOR SOLUTION INTRAVENOUS at 01:28

## 2022-10-20 RX ADMIN — OXYCODONE AND ACETAMINOPHEN 1 TABLET: 10; 325 TABLET ORAL at 13:18

## 2022-10-20 RX ADMIN — POLYETHYLENE GLYCOL 3350 17 G: 17 POWDER, FOR SOLUTION ORAL at 09:43

## 2022-10-20 NOTE — PROGRESS NOTES
Pharmacist Note - Vancomycin Dosing  Therapy day 8  Indication: Left leg cellulitis with abscesses, s/p I&D on 10/17 and 10/18  Current regimen: 1250 mg IV Q 8 hours    Recent Labs     10/20/22  0448 10/19/22  0613 10/18/22  0530   WBC  --  7.8 8.1   CREA 1.15 1.01 1.00   BUN 13 15 11       A random vancomycin level of 21.6 mcg/mL was obtained and from this level, the patient's AUC24 is calculated to be 453 with the current regimen. Goal target range AUC/RAFAEL 400-600      Plan: Continue current regimen. Pharmacy will continue to monitor this patient daily for changes in clinical status and renal function. *Random vancomycin levels are used to calculate AUC/RAFAEL, this level should not be interpreted as a trough. Vancomycin has been dosed using Bayesian kinetics software to target an AUC24:RAFAEL of 400-600, which provides adequate exposure for as assumed infection due to MRSA with an RAFAEL of 1 or less while reducing the risk of nephrotoxicity as seen with traditional trough based dosing goals.

## 2022-10-20 NOTE — PROGRESS NOTES
Transition Plan of Care  RUR 10%-Low  Disposition-plan to discharge home with [facility] and [facility] for wound care. Trilogy has been approved and we are waiting for delivery. CM will sent referrals via [employer] at this time.

## 2022-10-20 NOTE — PROGRESS NOTES
0800: Bedside and Verbal shift change report given to ROBERTO Zamora (oncoming nurse) by ROBERTO Delacruz (offgoing nurse). Report included the following information SBAR, Kardex, Procedure Summary, Recent Results, and Cardiac Rhythm SR/ST . 2000: Bedside and Verbal shift change report given to SOHA More (oncoming nurse) by Jen Park RN (offgoing nurse). Report included the following information SBAR, Kardex, Procedure Summary, Recent Results, and Cardiac Rhythm SR/ST.

## 2022-10-20 NOTE — PROGRESS NOTES
Problem: Lower Extremity Wound Care  Goal: *Non-infected wound: Improvement of existing wound, absence of infection, and maintenance of skin integrity  Outcome: Progressing Towards Goal  Goal: *Infected Wound: Prevention of further infection and promotion of healing  Description: Infection control procedures (eg: clean dressings, clean gloves, hand washing, precautions to isolate wound from contamination, sterile instruments used for wound debridement) should be implemented.   Outcome: Progressing Towards Goal  Goal: Interventions  Outcome: Progressing Towards Goal

## 2022-10-20 NOTE — CONSULTS
Infectious Disease Consult    Impression/Plan   LLE cellulitis/abscesses. Status post I&D x4. Unfortunately no cultures have been sent. Repeat LLE ultrasound today reveals 2 complex fluid collections which have decreased in size. Recommend drainage with SAMPLE SENT FOR CULTURE to guide further antibiotics. if culture is sterile consider discharge on empiric linezolid and Augmentin. I advised the patient to use good Rx to get the linezolid filled if his insurance will not cover it   Acute on chronic hypercarbic respiratory failure with history of restrictive lung disease and obesity hypoventilation syndrome. Managed by pulmonology  Morbid obesity. BMI 48.6    Anti-infectives:   Vancomycin    Subjective:   Date of Consultation:  October 20, 2022  Date of Admission: 10/12/2022   Referring Physician:     Patient is a 56 y.o. male with a past medical history significant for sleep apnea, obesity, and lymphedema who was admitted to [facility] on 10/12/22 for evaluation of shortness of breath and leg swelling. Initial work-up at the time of admission revealed acute left leg cellulitis. CT was negative for abscess and he was treated with vancomycin and piperacillin-tazobactam.  Despite the antibiotics leukocytosis worsened and general surgery was consulted on 10/14. Per general surgery there is no indication for intervention and orthopedic surgery consultation was recommended if there was concern for compartment syndrome. A left leg ultrasound was subsequently ordered which revealed complex fluid collections concerning for abscess. Patient underwent bedside I&D of 3 abscesses 10/17/2022 followed by another bedside I&D on 10/18. Unfortunately no cultures have been taken. Per chart notes patient may need additional I&D. Patient currently on vancomycin.   The infectious diseases service has been asked to assist with antibiotic management    Patient Active Problem List   Diagnosis Code Cellulitis L03.90    Acute respiratory failure with hypoxia and hypercapnia (HCC) J96.01, J96.02     Past Medical History:   Diagnosis Date    Sleep apnea 2018      Family History   Problem Relation Age of Onset    Diabetes Mother       Social History     Tobacco Use    Smoking status: Former     Packs/day: 0.50     Years: 5.00     Pack years: 2.50     Types: Cigarettes    Smokeless tobacco: Never   Substance Use Topics    Alcohol use: No     No past surgical history on file. Prior to Admission medications    Medication Sig Start Date End Date Taking? Authorizing Provider   acetaminophen (TYLENOL) 325 mg tablet Take 2 Tablets by mouth every six (6) hours as needed for Fever. 10/4/22   MARLENE Delacruz   cetirizine (ZyrTEC) 10 mg tablet Take 1 Tablet by mouth daily. 10/4/22   MARLENE Delacruz   gabapentin (NEURONTIN) 600 mg tablet TAKE 1 CAPSULE FOR 2 DAYS, THEN 2 CAPS AT BEDTIME FOR 2 DAYS, THEN 1 IN MORNING AND 2 IN EVENING  Patient not taking: Reported on 10/4/2022 8/3/20   Provider, Historical   furosemide (LASIX) 40 mg tablet Take 1 Tab by mouth daily. Patient not taking: Reported on 10/4/2022 12/20/19   Blanca Kirkpatrick MD     No Known Allergies     Review of Systems:  A comprehensive review of systems was negative except for that written in the History of Present Illness. Objective:   Blood pressure (!) 107/92, pulse 95, temperature 97.8 °F (36.6 °C), resp. rate 22, height 5' 8\" (1.727 m), weight 320 lb (145.2 kg), SpO2 96 %. Temp (24hrs), Av.4 °F (36.9 °C), Min:97.8 °F (36.6 °C), Max:99.9 °F (37.7 °C)       Exam:    General:  Alert, cooperative, well noursished, well developed, appears stated age   Eyes:  Sclera anicteric. Mouth/Throat: Mucous membranes normal   Neck: Supple   Lungs:   Clear to auscultation anteriorly   CV:  Regular rate and rhythm   Abdomen:   Soft, non-tender, non-distended   Extremities: LLE with 2 packed sites. No obvious cellulitis.   No purulence   Skin: No acute rash on exposed skin   Lymph nodes:    Musculoskeletal: Moves all   Lines/Devices:  Peripheral   Psych: Alert and oriented, normal mood affect given the setting       Data Review:   Recent Results (from the past 24 hour(s))   METABOLIC PANEL, BASIC    Collection Time: 10/20/22  4:48 AM   Result Value Ref Range    Sodium 134 (L) 136 - 145 mmol/L    Potassium 4.4 3.5 - 5.1 mmol/L    Chloride 99 97 - 108 mmol/L    CO2 33 (H) 21 - 32 mmol/L    Anion gap 2 (L) 5 - 15 mmol/L    Glucose 101 (H) 65 - 100 mg/dL    BUN 13 6 - 20 MG/DL    Creatinine 1.15 0.70 - 1.30 MG/DL    BUN/Creatinine ratio 11 (L) 12 - 20      eGFR >60 >60 ml/min/1.73m2    Calcium 9.4 8.5 - 10.1 MG/DL   VANCOMYCIN, RANDOM    Collection Time: 10/20/22  4:48 AM   Result Value Ref Range    Vancomycin, random 21.6 UG/ML        Microbiology:      Studies:      Signed By: Altaf Callaway DO     October 20, 2022

## 2022-10-20 NOTE — PROGRESS NOTES
Bedside and Verbal shift change report given to Katie Edwards RN (oncoming nurse) by Hi Rizvi RN (offgoing nurse). Report included the following information SBAR, Kardex, Intake/Output, MAR and Recent Results.

## 2022-10-20 NOTE — PROGRESS NOTES
Pulse oximetry assessment     96% at rest on room air (if 88% or less, skip next steps)    Patient does not wear O2 while awake, requires trilogy while asleep.

## 2022-10-20 NOTE — PROGRESS NOTES
Bedside shift change report given to [address] [place] [address] (oncoming nurse) by Roque Moe LPN (offgoing nurse). Report included the following information SBAR, Kardex, Intake/Output, MAR, and Cardiac Rhythm NSR .

## 2022-10-20 NOTE — PROGRESS NOTES
General Surgery Daily Progress Note    Admit Date: 10/12/2022  Post-Operative Day: * No surgery found * from * No surgery found *     Subjective:   CC: left leg swelling      Last 24 hrs:  patient denies any left leg pain, but states he still has no improvement to swelling in left leg and is concerned. Pt is on vanc, WBC nl. Denies any fever or chills. Objective:     Blood pressure (!) 107/92, pulse 95, temperature 97.8 °F (36.6 °C), resp. rate 22, height 5' 8\" (1.727 m), weight 320 lb (145.2 kg), SpO2 96 %. Temp (24hrs), Av.4 °F (36.9 °C), Min:97.8 °F (36.6 °C), Max:99.9 °F (37.7 °C)      _____________________  Physical Exam:     Alert and Oriented, x3, in no acute distress. Cardiovascular: RRR, no peripheral edema  LLE w/ new drsgs intact; wound areas soft, slightly TTP. Pt LLE remains swollen. Data Review:    Recent Labs     10/19/22  0613 10/18/22  0530   WBC 7.8 8.1   HGB 11.8* 12.4   HCT 37.9 40.2    326     Recent Labs     10/20/22  0448 10/19/22  0613 10/18/22  0530   * 134* 136   K 4.4 4.4 4.6   CL 99 98 99   CO2 33* 31 32   * 101* 96   BUN 13 15 11   CREA 1.15 1.01 1.00   CA 9.4 9.3 9.3   MG  --  2.4 2.5*   PHOS  --  3.8 3.9   ALB  --   --  2.9*   ALT  --   --  25     No results for input(s): AML, LPSE in the last 72 hours.         ______________________  Medications:    Current Facility-Administered Medications   Medication Dose Route Frequency    LORazepam (ATIVAN) tablet 1 mg  1 mg Oral QHS PRN    polyethylene glycol (MIRALAX) packet 17 g  17 g Oral DAILY    vancomycin (VANCOCIN) 1,250 mg in 0.9% sodium chloride 250 mL ([id number])  1,250 mg IntraVENous Q8H    furosemide (LASIX) injection 40 mg  40 mg IntraVENous DAILY    HYDROmorphone (DILAUDID) injection 1 mg  1 mg IntraVENous Q4H PRN    carvediloL (COREG) tablet 25 mg  25 mg Oral BID WITH MEALS    sodium chloride (NS) flush 5-40 mL  5-40 mL IntraVENous Q8H    sodium chloride (NS) flush 5-40 mL  5-40 mL IntraVENous PRN    acetaminophen (TYLENOL) tablet 650 mg  650 mg Oral Q6H PRN    Or    acetaminophen (TYLENOL) suppository 650 mg  650 mg Rectal Q6H PRN    ondansetron (ZOFRAN ODT) tablet 4 mg  4 mg Oral Q8H PRN    Or    ondansetron (ZOFRAN) injection 4 mg  4 mg IntraVENous Q6H PRN    enoxaparin (LOVENOX) injection 30 mg  30 mg SubCUTAneous Q12H    oxyCODONE-acetaminophen (PERCOCET 10)  mg per tablet 1 Tablet  1 Tablet Oral Q4H PRN    vancomycin - pharmacy to dose   Other Rx Dosing/Monitoring    hydrALAZINE (APRESOLINE) 20 mg/mL injection 10 mg  10 mg IntraVENous Q6H PRN               Assessment:     56year old male with left lower extremity abscess x 3    Plan:     Continue with antibiotics- ID consult placed to help patient transition to oral antibiotics for possible discharge home.   Continue with [address]  LLE venous duplex pending to R/O DVT and LLE ultrasound to assess abscesses pending   Elevate LLE   D/W Dr. Dc Hinson, NP    10/20/2022

## 2022-10-20 NOTE — PROGRESS NOTES
[address] [facility] Adult  Hospitalist Group                                                                                          Hospitalist Progress Note  Sylvester Walter MD  Answering service: [phone] from in house phone        Date of Service:  10/20/2022  NAME:  Haroon Vela  :  1975  MRN:  [id number]      Admission Summary:   Haroon Vela is a 56 y.o. male  with past medical history of obstructive sleep apnea, class III obesity, lymphedema presented to the emergency department via private transport from home with reported initial chief complaint of shortness of breath and leg swelling. on admission the patient is obtunded and not providing history. As such majority of history was obtained per review of ED and electronic medical records. Per ER triage note, patient arrived awake alert oriented x4. He reportedly had chronic leg edema and swelling over the past year. Leg swelling and shortness of breath notably worsened over the past prior 3 days. He initially went to [facility] urgent care center a few days ago and has been given antibiotics for unknown diagnosis. On arrival emergency department tonight, initial recorded vital signs temperature 97.7 °F, /95, heart rate 108, respirate 18, O2 saturation 95% on room air. Per chart records patient was placed on 2 L, followed by 5 L of O2 via nasal cannula and then a hundredths and nonrebreather. Venous blood gas showed pH 7.32, PCO2 55.4, PO2 66, HCO3 28.2, base excess 0.8, SaO2 90.2%. CTA chest with IV contrast was negative for PE and showed no acute process. 12 EKG shows sinus tachycardia, right bundle branch block, left anterior fascicular block 102 bpm.  ED requested admission to the hospitalist service.   However patient was requiring a hundredths and nonrebreather facemask and high risk of deterioration, I request the ED consult with ICU intensivist.  On arrival at the patient's bedside, patient was on CPAP with O2 saturation 99%. As such, orders were placed for admission to the hospitalist service. Interval history / Subjective:   S/p I&D 10/17, 10/18   Still has swelling pain tightness left lower extremity. Discussed with surgery. Assessment & Plan:      Acute on chronic hypercarbic respiratory failure, respiratory acidosis is partially compensated  Untreated sleep apnea/obesity hypoventilation syndrome  Pulmonologist consulted  Continue Bipap HS and prn at nap time  Recommend that he return to the sleep clinic with Dr. Rajani Coto immediately after discharge to reestablish care  He will need compliance with recommended PAP therapy, tracheostomy is another option: pulmonologist is arranging trilogy for pt. Diuresis as long cr tolerate   Lasix 40mg iv daily now     Acute left leg cellulitis:  Stat leg CT no abscess, no acute compartment, check CPK   Continue iv vanco and zosyn for now  WBC worse   Still quite swelling/tender consult general surgeon for the concerning of compartment. dorsalis pulse palpable. No right foot numbness for now   General surgeon and ortho consulted : no surgery for now  ordered left leg US to see if any new abscess collection :3.3 cm x 2.8 cm x 1.5 cm and 2.9 cm x 2.9 cm x 1.6 cm complex fluid collections  with extensive internal echogenic debris and peripheral vascularity in the  subcutaneous soft tissue  Pt will need I&D: this was discussed with general surgeon DR. Sexton 10/16   I&d 10/17 with general surgeon  Pt has 3 abscesses on L leg: lateral  and posterior calf draining bloody drainage,  Anterior distal shin fluctuant and tender small opening with scan amt bloody drainage: repeat I&D 10/18    -dc zosyn 10/18  Continue iv vanco   Wound care  -d/w surgery: We will repeat ultrasound lower extremity.   Continue IV antibiotic      Bilateral lower extremity edema/lymphedema   Prn diuresis   Keep legs elevated      Uncontrolled systemic hypertension    Acute metabolic encephalopathy/Altered mental status  -due to hypercapnia. As such change settings to BiPAP and correct respiratory acidosis  -resolved now       Tachycardia   -continue telemetry monitoring  -resolved      Class 3 obesity  -BMI 49.26 kg/m2  -would urge eventual weight loss, heart healthy diet, lifestyle modification  Left lower extremity DVT: Continue Lovenox       Code status: full   Prophylaxis: sc lovenox   Care Plan discussed with: pt, cm, rn,   Anticipated Disposition: TBD, home if no further new abscess developed. Hospital Problems  Date Reviewed: 2/22/2021            Codes Class Noted POA    Acute respiratory failure with hypoxia and hypercapnia [facility]) ICD-10-CM: J96.01, J96.02  ICD-9-CM: 518.81  10/13/2022 Unknown       Review of Systems:   A comprehensive review of systems was negative except for that written in the HPI. Vital Signs:    Last 24hrs VS reviewed since prior progress note. Most recent are:  Visit Vitals  BP (!) 107/92   Pulse 95   Temp 97.8 °F (36.6 °C)   Resp 22   Ht 5' 8\" (1.727 m)   Wt 145.2 kg (320 lb)   SpO2 96%   BMI 48.66 kg/m²         Intake/Output Summary (Last 24 hours) at 10/20/2022 1246  Last data filed at 10/20/2022 1155  Gross per 24 hour   Intake --   Output 2350 ml   Net -2350 ml          Physical Examination:     I had a face to face encounter with this patient and independently examined them on 10/20/2022 as outlined below:          Constitutional:  No acute distress, cooperative, pleasant    ENT:  Oral mucosa moist, oropharynx benign. Resp:  CTA bilaterally. No wheezing/rhonchi/rales. No accessory muscle use. CV:  Regular rhythm, normal rate, no murmurs, gallops, rubs    GI:  Soft, non distended, non tender. normoactive bowel sounds, no hepatosplenomegaly     Musculoskeletal:  Left leg swelling, dressing on 3 open wound, small induration noticed at posterior leg. Neurologic:  Moves all extremities.   AAOx3, CN II-XII reviewed            Data Review: Review and/or order of clinical lab test      Labs:     Recent Labs     10/19/22  0613 10/18/22  0530   WBC 7.8 8.1   HGB 11.8* 12.4   HCT 37.9 40.2    326       Recent Labs     10/20/22  0448 10/19/22  0613 10/18/22  0530   * 134* 136   K 4.4 4.4 4.6   CL 99 98 99   CO2 33* 31 32   BUN 13 15 11   CREA 1.15 1.01 1.00   * 101* 96   CA 9.4 9.3 9.3   MG  --  2.4 2.5*   PHOS  --  3.8 3.9       Recent Labs     10/18/22  0530   ALT 25   AP 56   TBILI 0.4   TP 7.2   ALB 2.9*   GLOB 4.3*       No results for input(s): INR, PTP, APTT, INREXT, INREXT in the last 72 hours. No results for input(s): FE, TIBC, PSAT, FERR in the last 72 hours. No results found for: FOL, RBCF   No results for input(s): PH, PCO2, PO2 in the last 72 hours. No results for input(s): CPK, CKNDX, TROIQ in the last 72 hours.     No lab exists for component: CPKMB    Lab Results   Component Value Date/Time    Cholesterol, total 147 05/07/2013 12:49 PM    HDL Cholesterol 37 (L) 05/07/2013 12:49 PM    LDL, calculated 98 05/07/2013 12:49 PM    Triglyceride 62 05/07/2013 12:49 PM     No results found for: [facility]  Lab Results   Component Value Date/Time    Color YELLOW/STRAW 07/01/2018 11:04 PM    Appearance CLEAR 07/01/2018 11:04 PM    Specific gravity 1.026 07/01/2018 11:04 PM    pH (UA) 5.5 07/01/2018 11:04 PM    Protein NEGATIVE 07/01/2018 11:04 PM    Glucose NEGATIVE 07/01/2018 11:04 PM    Ketone NEGATIVE 07/01/2018 11:04 PM    Bilirubin NEGATIVE 07/01/2018 11:04 PM    Urobilinogen 1.0 07/01/2018 11:04 PM    Nitrites NEGATIVE 07/01/2018 11:04 PM    Leukocyte Esterase NEGATIVE 07/01/2018 11:04 PM    Epithelial cells FEW 07/01/2018 11:04 PM    Bacteria NEGATIVE 07/01/2018 11:04 PM    WBC 0-4 07/01/2018 11:04 PM    RBC 0-5 07/01/2018 11:04 PM         Medications Reviewed:     Current Facility-Administered Medications   Medication Dose Route Frequency    LORazepam (ATIVAN) tablet 1 mg  1 mg Oral QHS PRN    polyethylene glycol (MIRALAX) packet 17 g  17 g Oral DAILY    vancomycin (VANCOCIN) 1,250 mg in 0.9% sodium chloride 250 mL ([employer])  1,250 mg IntraVENous Q8H    furosemide (LASIX) injection 40 mg  40 mg IntraVENous DAILY    HYDROmorphone (DILAUDID) injection 1 mg  1 mg IntraVENous Q4H PRN    carvediloL (COREG) tablet 25 mg  25 mg Oral BID WITH MEALS    sodium chloride (NS) flush 5-40 mL  5-40 mL IntraVENous Q8H    sodium chloride (NS) flush 5-40 mL  5-40 mL IntraVENous PRN    acetaminophen (TYLENOL) tablet 650 mg  650 mg Oral Q6H PRN    Or    acetaminophen (TYLENOL) suppository 650 mg  650 mg Rectal Q6H PRN    ondansetron (ZOFRAN ODT) tablet 4 mg  4 mg Oral Q8H PRN    Or    ondansetron (ZOFRAN) injection 4 mg  4 mg IntraVENous Q6H PRN    enoxaparin (LOVENOX) injection 30 mg  30 mg SubCUTAneous Q12H    oxyCODONE-acetaminophen (PERCOCET 10)  mg per tablet 1 Tablet  1 Tablet Oral Q4H PRN    vancomycin - pharmacy to dose   Other Rx Dosing/Monitoring    hydrALAZINE (APRESOLINE) 20 mg/mL injection 10 mg  10 mg IntraVENous Q6H PRN     ______________________________________________________________________  EXPECTED LENGTH OF STAY: 3d 14h  ACTUAL LENGTH OF STAY:          Shaan Duran MD

## 2022-10-21 VITALS
BODY MASS INDEX: 47.74 KG/M2 | SYSTOLIC BLOOD PRESSURE: 129 MMHG | WEIGHT: 315 LBS | HEIGHT: 68 IN | DIASTOLIC BLOOD PRESSURE: 86 MMHG | TEMPERATURE: 97.7 F | OXYGEN SATURATION: 98 % | RESPIRATION RATE: 15 BRPM | HEART RATE: 88 BPM

## 2022-10-21 LAB
ANION GAP SERPL CALC-SCNC: 5 MMOL/L (ref 5–15)
BUN SERPL-MCNC: 12 MG/DL (ref 6–20)
BUN/CREAT SERPL: 10 (ref 12–20)
CALCIUM SERPL-MCNC: 9.2 MG/DL (ref 8.5–10.1)
CHLORIDE SERPL-SCNC: 100 MMOL/L (ref 97–108)
CO2 SERPL-SCNC: 30 MMOL/L (ref 21–32)
CREAT SERPL-MCNC: 1.21 MG/DL (ref 0.7–1.3)
GLUCOSE SERPL-MCNC: 91 MG/DL (ref 65–100)
POTASSIUM SERPL-SCNC: 4.4 MMOL/L (ref 3.5–5.1)
SODIUM SERPL-SCNC: 135 MMOL/L (ref 136–145)

## 2022-10-21 PROCEDURE — 74011250637 HC RX REV CODE- 250/637: Performed by: HOSPITALIST

## 2022-10-21 PROCEDURE — 74011250636 HC RX REV CODE- 250/636: Performed by: FAMILY MEDICINE

## 2022-10-21 PROCEDURE — 74011000250 HC RX REV CODE- 250: Performed by: FAMILY MEDICINE

## 2022-10-21 PROCEDURE — 36415 COLL VENOUS BLD VENIPUNCTURE: CPT

## 2022-10-21 PROCEDURE — 99024 POSTOP FOLLOW-UP VISIT: CPT | Performed by: NURSE PRACTITIONER

## 2022-10-21 PROCEDURE — 80048 BASIC METABOLIC PNL TOTAL CA: CPT

## 2022-10-21 PROCEDURE — 77030037878 HC DRSG MEPILEX >48IN BORD MOLN -B

## 2022-10-21 PROCEDURE — 77030041076 HC DRSG AG OPTICELL MDII -A

## 2022-10-21 PROCEDURE — 74011250636 HC RX REV CODE- 250/636: Performed by: HOSPITALIST

## 2022-10-21 PROCEDURE — 77030037877 HC DRSG MEPILEX >48IN BORD MOLN -A

## 2022-10-21 RX ORDER — FUROSEMIDE 40 MG/1
40 TABLET ORAL DAILY
Qty: 30 TABLET | Refills: 0 | Status: SHIPPED | OUTPATIENT
Start: 2022-10-21

## 2022-10-21 RX ORDER — AMOXICILLIN AND CLAVULANATE POTASSIUM 875; 125 MG/1; MG/1
1 TABLET, FILM COATED ORAL 2 TIMES DAILY
Qty: 20 TABLET | Refills: 0 | Status: SHIPPED | OUTPATIENT
Start: 2022-10-21 | End: 2022-11-04 | Stop reason: ALTCHOICE

## 2022-10-21 RX ORDER — CARVEDILOL 25 MG/1
25 TABLET ORAL 2 TIMES DAILY WITH MEALS
Qty: 60 TABLET | Refills: 1 | Status: SHIPPED | OUTPATIENT
Start: 2022-10-21

## 2022-10-21 RX ORDER — OXYCODONE AND ACETAMINOPHEN 5; 325 MG/1; MG/1
1 TABLET ORAL
Qty: 12 TABLET | Refills: 0 | Status: SHIPPED | OUTPATIENT
Start: 2022-10-21 | End: 2022-10-24

## 2022-10-21 RX ORDER — LINEZOLID 600 MG/1
600 TABLET, FILM COATED ORAL 2 TIMES DAILY
Qty: 10 TABLET | Refills: 0 | Status: SHIPPED | OUTPATIENT
Start: 2022-10-21 | End: 2022-11-11 | Stop reason: ALTCHOICE

## 2022-10-21 RX ADMIN — SODIUM CHLORIDE, PRESERVATIVE FREE 10 ML: 5 INJECTION INTRAVENOUS at 00:03

## 2022-10-21 RX ADMIN — FUROSEMIDE 40 MG: 10 INJECTION, SOLUTION INTRAVENOUS at 10:33

## 2022-10-21 RX ADMIN — OXYCODONE AND ACETAMINOPHEN 1 TABLET: 10; 325 TABLET ORAL at 05:44

## 2022-10-21 RX ADMIN — ENOXAPARIN SODIUM 140 MG: 150 INJECTION SUBCUTANEOUS at 05:44

## 2022-10-21 RX ADMIN — CARVEDILOL 25 MG: 12.5 TABLET, FILM COATED ORAL at 10:36

## 2022-10-21 RX ADMIN — VANCOMYCIN HYDROCHLORIDE 1250 MG: 1.25 INJECTION, POWDER, LYOPHILIZED, FOR SOLUTION INTRAVENOUS at 10:32

## 2022-10-21 RX ADMIN — VANCOMYCIN HYDROCHLORIDE 1250 MG: 1.25 INJECTION, POWDER, LYOPHILIZED, FOR SOLUTION INTRAVENOUS at 02:43

## 2022-10-21 RX ADMIN — SODIUM CHLORIDE, PRESERVATIVE FREE 10 ML: 5 INJECTION INTRAVENOUS at 05:44

## 2022-10-21 RX ADMIN — HYDROMORPHONE HYDROCHLORIDE 1 MG: 1 INJECTION, SOLUTION INTRAMUSCULAR; INTRAVENOUS; SUBCUTANEOUS at 00:02

## 2022-10-21 RX ADMIN — POLYETHYLENE GLYCOL 3350 17 G: 17 POWDER, FOR SOLUTION ORAL at 10:34

## 2022-10-21 RX ADMIN — OXYCODONE AND ACETAMINOPHEN 1 TABLET: 10; 325 TABLET ORAL at 10:36

## 2022-10-21 NOTE — PROGRESS NOTES
Transition Plan of Care  RUR 9%-Low  Disposition-did not discharge yesterday as planned> ID consulted to weigh in on antibiotics. US of leg showed 2 fluid collections. Patient has had several drainage of fluid but no cultures were sent. ID recommends draining these areas and sent sample to guide antibiotics. Home health has been setup and Trilogy has been approved for discharge. Update-discharging home today. Family can transport. Trilogy will be delivered to patient's room prior to discharge.

## 2022-10-21 NOTE — PROGRESS NOTES
Dr. Herman Monte called at [phone] to advise that pt may need surgery today and instructed that Lovenox be held; message was taken by charge nurse (Mary RN); However, Lovenox was scheduled for 0500 and was administered at 0544. CHANELL Boyce NP notified via perfect serve.

## 2022-10-21 NOTE — PROGRESS NOTES
Problem: Falls - Risk of  Goal: *Absence of Falls  Description: Document Sobeida Humphries Fall Risk and appropriate interventions in the flowsheet. Outcome: Resolved/Met     Problem: Patient Education: Go to Patient Education Activity  Goal: Patient/Family Education  Outcome: Resolved/Met     Problem: Lower Extremity Wound Care  Goal: *Non-infected wound: Improvement of existing wound, absence of infection, and maintenance of skin integrity  10/21/2022 1221 by Evelia Shah RN  Outcome: Resolved/Met  10/21/2022 0834 by Evelia Shah RN  Outcome: Progressing Towards Goal  Goal: *Infected Wound: Prevention of further infection and promotion of healing  Description: Infection control procedures (eg: clean dressings, clean gloves, hand washing, precautions to isolate wound from contamination, sterile instruments used for wound debridement) should be implemented.   10/21/2022 1221 by Evelia Shah RN  Outcome: Resolved/Met  10/21/2022 0834 by Evelia Shah RN  Outcome: Progressing Towards Goal  Goal: Interventions  10/21/2022 1221 by Evelia Shah RN  Outcome: Resolved/Met  10/21/2022 0834 by Evelia Shah RN  Outcome: Progressing Towards Goal

## 2022-10-21 NOTE — DISCHARGE SUMMARY
Discharge Summary       PATIENT ID: Hernandez Fernández  MRN: [id number]   YOB: 1975    DATE OF ADMISSION: 10/12/2022 10:42 PM    DATE OF DISCHARGE: 10/21/22  PRIMARY CARE PROVIDER: None     ATTENDING PHYSICIAN: sonu  DISCHARGING PROVIDER: Mary Ann Ibanez MD    To contact this individual call [phone] and ask the  to page. If unavailable ask to be transferred the Adult Hospitalist Department. CONSULTATIONS: IP CONSULT TO PULMONOLOGY  IP CONSULT TO GENERAL SURGERY  IP CONSULT TO ORTHOPEDIC SURGERY  IP CONSULT TO INFECTIOUS DISEASES  IP CONSULT TO INFECTIOUS DISEASES    PROCEDURES/SURGERIES: * No surgery found *    DISCHARGE DIAGNOSES:           ADMISSION SUMMARY AND HOSPITAL COURSE:   Hernandez Fernández is a 56 y.o. male  with past medical history of obstructive sleep apnea, class III obesity, lymphedema presented to the emergency department via private transport from home with reported initial chief complaint of shortness of breath and leg swelling. At current the patient is obtunded and not providing history. As such majority of history was obtained per review of ED and electronic medical records. Per ER triage note, patient arrived awake alert oriented x4. He reportedly had chronic leg edema and swelling over the past year. Leg swelling and shortness of breath notably worsened over the past prior 3 days. He initially went to [facility] urgent care center a few days ago and has been given antibiotics for unknown diagnosis. On arrival emergency department tonight, initial recorded vital signs temperature 97.7 °F, /95, heart rate 108, respirate 18, O2 saturation 95% on room air. Per chart records patient was placed on 2 L, followed by 5 L of O2 via nasal cannula and then a hundredths and nonrebreather. Venous blood gas showed pH 7.32, PCO2 55.4, PO2 66, HCO3 28.2, base excess 0.8, SaO2 90.2%. CTA chest with IV contrast was negative for PE and showed no acute process.   12 EKG shows sinus tachycardia, right bundle branch block, left anterior fascicular block 102 bpm.  ED requested admission to the hospitalist service. However patient was requiring a hundredths and nonrebreather facemask and high risk of deterioration, I request the ED consult with ICU intensivist.  On arrival at the patient's bedside, patient was on CPAP with O2 saturation 99%. As such, orders were placed for admission to the hospitalist service. DISCHARGE DIAGNOSES / PLAN:      Acute on chronic hypercarbic respiratory failure, respiratory acidosis is partially compensated  Untreated sleep apnea/obesity hypoventilation syndrome  Pulmonologist consulted  Continue Bipap HS and prn at nap time  Recommend that he return to the sleep clinic with Dr. Merritt Hanson immediately after discharge to reestablish care  He will need compliance with recommended PAP therapy, tracheostomy is another option: pulmonologist is arranging trilogy for pt. Diuresis as long cr tolerate   Lasix 40mg iv daily now      Acute left leg cellulitis:  Stat leg CT no abscess, no acute compartment, check CPK   Continue iv vanco and zosyn for now  WBC worse   Still quite swelling/tender consult general surgeon for the concerning of compartment. dorsalis pulse palpable. No right foot numbness for now   General surgeon and ortho consulted : no surgery for now  ordered left leg US to see if any new abscess collection :3.3 cm x 2.8 cm x 1.5 cm and 2.9 cm x 2.9 cm x 1.6 cm complex fluid collections  with extensive internal echogenic debris and peripheral vascularity in the  subcutaneous soft tissue  Pt will need I&D: this was discussed with general surgeon DR. Sexton 10/16   I&d 10/17 with general surgeon  Pt has 3 abscesses on L leg: lateral  and posterior calf draining bloody drainage,  Anterior distal shin fluctuant and tender small opening with scan amt bloody drainage: repeat I&D 10/18    -dc zosyn 10/18  Continue iv vanco   Wound care  -d/w surgery: We will repeat ultrasound lower extremity. Continue IV antibiotic        Bilateral lower extremity edema/lymphedema   Prn diuresis   Keep legs elevated        Uncontrolled systemic hypertension     Acute metabolic encephalopathy/Altered mental status  -due to hypercapnia. As such change settings to BiPAP and correct respiratory acidosis  -resolved now       Tachycardia   -continue telemetry monitoring  -resolved      Class 3 obesity  -BMI 49.26 kg/m2  -would urge eventual weight loss, heart healthy diet, lifestyle modification  Left lower extremity DVT: Continue Lovenox        Code status: full   Prophylaxis: sc lovenox   Care Plan discussed with: pt, cm, rn,   Anticipated Disposition: TBD, home if no further new abscess developed. BMI: Body mass index is 48.66 kg/m². . This patient: Meets criteria for severe obesity given BMI >/= to 40 due to excess calories/nutritional. Weight loss and lifestyle modifications should be encouraged as an outpatient. PENDING TEST RESULTS:   At the time of discharge the following test results are still pending: none pending      ADDITIONAL CARE RECOMMENDATIONS:   Follow up with consultant and PCP     NOTIFY YOUR PHYSICIAN FOR ANY OF THE FOLLOWING:   Fever over 101 degrees for 24 hours. Chest pain, shortness of breath, fever, chills, nausea, vomiting, diarrhea, change in mentation, falling, weakness, bleeding. Severe pain or pain not relieved by medications, as well as any other signs or symptoms that you may have questions about.     FOLLOW UP APPOINTMENTS:    Follow-up Information       Follow up With Specialties Details Why [address] [facility]  Follow up  [facility]  [phone]    None    None ([phone]) Patient stated that they have no PCP      Lorri Madrigal MD Internal Medicine Physician, Pulmonary Disease Follow up  [address] [address]      Lisa Renee, [address] [place] [address], [address] [place] [address], [facility] General [address] [facility],[address] [facility]  [phone]                 DIET: Resume previous diet    ACTIVITY: Activity as tolerated    EQUIPMENT needed: Trilogy    DISCHARGE MEDICATIONS:  Current Discharge Medication List        START taking these medications    Details   carvediloL (COREG) 25 mg tablet Take 1 Tablet by mouth two (2) times daily (with meals). Qty: 60 Tablet, Refills: 1  Start date: 10/21/2022      amoxicillin-clavulanate (Augmentin) 875-125 mg per tablet Take 1 Tablet by mouth two (2) times a day. Qty: 20 Tablet, Refills: 0  Start date: 10/21/2022      linezolid (ZYVOX) 600 mg tablet Take 1 Tablet by mouth two (2) times a day. Qty: 10 Tablet, Refills: 0  Start date: 10/21/2022      apixaban (ELIQUIS) 5 mg (74 tabs) starter pack 10 mg bid for one week 5mg bid after one week  Qty: 1 Dose Pack, Refills: 2  Start date: 10/21/2022           CONTINUE these medications which have NOT CHANGED    Details   acetaminophen (TYLENOL) 325 mg tablet Take 2 Tablets by mouth every six (6) hours as needed for Fever. Qty: 20 Tablet, Refills: 0      cetirizine (ZyrTEC) 10 mg tablet Take 1 Tablet by mouth daily. Qty: 20 Tablet, Refills: 0      gabapentin (NEURONTIN) 600 mg tablet TAKE 1 CAPSULE FOR 2 DAYS, THEN 2 CAPS AT BEDTIME FOR 2 DAYS, THEN 1 IN MORNING AND 2 IN EVENING      furosemide (LASIX) 40 mg tablet Take 1 Tab by mouth daily. Qty: 30 Tab, Refills: 1             DISPOSITION:    Home With:   OT  PT  HH  RN       Long term SNF/Inpatient Rehab    Independent/assisted living    Hospice    Other:       PATIENT CONDITION AT DISCHARGE:     Functional status    Poor     Deconditioned     Independent      Cognition     Lucid     Forgetful     Dementia      Catheters/lines (plus indication)    Thao     PICC     PEG     None      Code status     Full code     DNR      PHYSICAL EXAMINATION AT DISCHARGE:  General:          Alert, cooperative, no distress, appears stated age.      HEENT:           Atraumatic, anicteric sclerae, pink conjunctivae                          No oral ulcers, mucosa moist, throat clear, dentition fair  Neck:               Supple, symmetrical  Lungs:             Clear to auscultation bilaterally. No Wheezing or Rhonchi. No rales. Chest wall:      No tenderness  No Accessory muscle use. Heart:              Regular  rhythm,  No  murmur   No edema  Abdomen:        Soft, non-tender. Not distended. Bowel sounds normal  Extremities:     No cyanosis. No clubbing,                            Skin turgor normal, Capillary refill normal  Skin:                Not pale. Not Jaundiced  No rashes   Psych:             Not anxious or agitated.   Neurologic:      Alert, moves all extremities, answers questions appropriately and responds to commands       CHRONIC MEDICAL DIAGNOSES:  Problem List as of 10/21/2022 Date Reviewed: 2/22/2021            Codes Class Noted - Resolved    Acute respiratory failure with hypoxia and hypercapnia (HCC) ICD-10-CM: J96.01, J96.02  ICD-9-CM: 518.81  10/13/2022 - Present        Cellulitis ICD-10-CM: L03.90  ICD-9-CM: 682.9  8/27/2019 - Present           Greater than 35 minutes were spent with the patient on counseling and coordination of care    Signed:   Cecile Gaona MD  10/21/2022  11:57 AM

## 2022-10-21 NOTE — PROGRESS NOTES
General Surgery Daily Progress Note    Admit Date: 10/12/2022  Post-Operative Day: * No surgery found * from * No surgery found *     Subjective:     Last 24 hrs: pt is eager to go home; L leg swelling is somewhat uncomforable; +DVT -on therapeutic lovenox      Objective:     Blood pressure 129/86, pulse 88, temperature 97.7 °F (36.5 °C), resp. rate 15, height 5' 8\" (1.727 m), weight 320 lb (145.2 kg), SpO2 98 %. Temp (24hrs), Av °F (36.7 °C), Min:97.3 °F (36.3 °C), Max:98.5 °F (36.9 °C)      _____________________  Physical Exam:     Alert and Oriented, x3, in no acute distress. Cardiovascular: RRR, no peripheral edema  LLE w/ edema; wounds dressed; old bloody drainage      Assessment:   Active Problems:    Acute respiratory failure with hypoxia and hypercapnia ([address] (10/13/2022)            Plan:     May go home on oral abx  Follow up w/ Dr Maryam Mejía in 2 weeks  Oral anticoagulant per hospitalist    Data Review:    Recent Labs     10/19/22  0613   WBC 7.8   HGB 11.8*   HCT 37.9        Recent Labs     10/21/22  0500 10/20/22  0448 10/19/22  0613   * 134* 134*   K 4.4 4.4 4.4    99 98   CO2 30 33* 31   GLU 91 101* 101*   BUN 12 13 15   CREA 1.21 1.15 1.01   CA 9.2 9.4 9.3   MG  --   --  2.4   PHOS  --   --  3.8     No results for input(s): AML, LPSE in the last 72 hours.         ______________________  Medications:    Current Facility-Administered Medications   Medication Dose Route Frequency    enoxaparin (LOVENOX) injection 140 mg  1 mg/kg SubCUTAneous Q12H    LORazepam (ATIVAN) tablet 1 mg  1 mg Oral QHS PRN    polyethylene glycol (MIRALAX) packet 17 g  17 g Oral DAILY    vancomycin (VANCOCIN) 1,250 mg in 0.9% sodium chloride 250 mL ([id number])  1,250 mg IntraVENous Q8H    furosemide (LASIX) injection 40 mg  40 mg IntraVENous DAILY    HYDROmorphone (DILAUDID) injection 1 mg  1 mg IntraVENous Q4H PRN    carvediloL (COREG) tablet 25 mg  25 mg Oral BID WITH MEALS    sodium chloride (NS) flush 5-40 mL  5-40 mL IntraVENous Q8H    sodium chloride (NS) flush 5-40 mL  5-40 mL IntraVENous PRN    acetaminophen (TYLENOL) tablet 650 mg  650 mg Oral Q6H PRN    Or    acetaminophen (TYLENOL) suppository 650 mg  650 mg Rectal Q6H PRN    ondansetron (ZOFRAN ODT) tablet 4 mg  4 mg Oral Q8H PRN    Or    ondansetron (ZOFRAN) injection 4 mg  4 mg IntraVENous Q6H PRN    oxyCODONE-acetaminophen (PERCOCET 10)  mg per tablet 1 Tablet  1 Tablet Oral Q4H PRN    vancomycin - pharmacy to dose   Other Rx Dosing/Monitoring    hydrALAZINE (APRESOLINE) 20 mg/mL injection 10 mg  10 mg IntraVENous Q6H PRN       Jasper Martinez NP  10/21/2022

## 2022-10-21 NOTE — PROGRESS NOTES
PULMONARY MEDICINE    Progress Note    Name: Dale Frias   : 1975   MRN: [id number]   Date: 10/21/2022      Subjective:     10/21  Sitting up  On room air  Trilogy getting delivered today     10/20  Getting wound therapy  Breathing stable   Trilogy is getting set up     10/19  Breathing stable today    Latest Reference Range & Units 10/13/22 05:11   pH (POC) 7.35 - 7.45   7.33 (L)   pCO2 (POC) 35.0 - 45.0 MMHG 57.7 (H)   pO2 (POC) 80 - 100 MMHG 148 (H)   HCO3 (POC) 22 - 26 MMOL/L 30.3 (H)   sO2 (POC) 92 - 97 % 99.1 (H)   Base excess (POC) mmol/L 2.8   FIO2 (POC) % 40   Specimen type (POC) -   ARTERIAL   (L): Data is abnormally low  (H): Data is abnormally high    10/18  Chest film yesterday showed nothing acute   Was off O2 this am siting up and had saturations maintaining. No over dyspnea at rest, + dyspnea with ambulation     10/17  O2 requirements up but he feels ok  Intermittent compliant with NIV   ECHO from 10/14  Result status: Final result       Left Ventricle: Normal left ventricular systolic function with a visually estimated EF of 60 - 65%. Left ventricle size is normal. Mildly increased wall thickness. Normal wall motion. Normal diastolic function. 10/14  Significant apnea while when I entered the room. He continues to refuse his NIV  ECHO pending     Consult Note: 10/13  Requesting Physician: Dr. Augustine Gonzalez  Reason for consult: Acute on chronic hypercapnic respiratory failure    Medical records and data reviewed. Patient is a 56 y.o. male who presented to the hospital with bilateral leg pain, left more than right. This has been worsening for the past 7 days. This was not associated with fever. He was noted to be lethargic on arrival and ABG had suggested partially compensated hypercarbic respiratory failure with hypoxia. We are asked to see him in consultation. History is limited from the patient who is falling asleep in the middle of a conversation.   When aroused he does say he is feeling a little better than the time of admission and shortness of breath improves in a recliner in weight he is right now. He managed to awaken enough to say that he has been diagnosed with sleep apnea in the past but has been noncompliant with CPAP. Review of medical records and the [facility] shows that he underwent a diagnostic polysomnogram with apnea-hypopnea index of 40 events per hour has been followed by Dr. Rosana Ferrell and [facility] sleep clinic. Last contact was in February 2021. He had been prescribed AutoPap at 5 to 15 cm of water pressure set up on December 2020. He also has had refractory pedal edema. Venous ultrasound on admission did not show DVT. Last echocardiogram was several years ago and had not shown pulmonary hypertension for which he has is at increased risk given untreated sleep apnea. In the hospital he has had poorly controlled systemic hypertension as well. He is receiving antibiotics for possible cellulitis. He has been a smoker in the past.  CT angiogram of the chest had not suggested presence of pulmonary pulm parenchymal or pleural abnormality, no pulmonary emboli was identified    Review of Systems:     A comprehensive 12 system review of systems was limited from the patient    Assessment:   Acute on chronic hypercarbic respiratory failure, respiratory acidosis is partially compensated  Restrictive lung disease and obesity hypoventilation syndrome  Bilateral lower extremity edema, at risk for pulmonary hypertension  Lower extremity cellulitis  THC smoker   Uncontrolled systemic hypertension  Unknown FEV1  Other medical problems per chart    Recommendations:   He has difficulty tolerating BiPAP (including S/T) and CPAP is not indicated in the treatment for hypercapnia. We had a long discussed today of the importance of NIV . He is in agreement to do something.  He does not want to follow up with  sleep clinic so we will set him up on trilogy in the form of AVAPS-AE, the preferred choice of ventilation for the chronic management of hypercapnia  Settings:   PSmax: [id number]   PSmin: 4cmh2o   EPAPmax: 47hrf1s   EPAPmin: 6cmh2o   Auto back up rate. On diuretics  Receiving antibiotics  O2 titration above 90%. Wean O2 as able  We will need to look into PFTs following discharge   Possible d/c soon   PRN over the weekend if he stays     Active Problem List:     Problem List  Date Reviewed: 2/22/2021            Codes Class    Acute respiratory failure with hypoxia and hypercapnia (HCC) ICD-10-CM: J96.01, J96.02  ICD-9-CM: 518.81         Cellulitis ICD-10-CM: L03.90  ICD-9-CM: 682.9        Past Medical History:      has a past medical history of Sleep apnea (2018). Past Surgical History:      has no past surgical history on file. Home Medications:     Prior to Admission medications    Medication Sig Start Date End Date Taking? Authorizing Provider   acetaminophen (TYLENOL) 325 mg tablet Take 2 Tablets by mouth every six (6) hours as needed for Fever. 10/4/22   MARLENE Serrano   cetirizine (ZyrTEC) 10 mg tablet Take 1 Tablet by mouth daily. 10/4/22   MARLENE Serrano   gabapentin (NEURONTIN) 600 mg tablet TAKE 1 CAPSULE FOR 2 DAYS, THEN 2 CAPS AT BEDTIME FOR 2 DAYS, THEN 1 IN MORNING AND 2 IN EVENING  Patient not taking: Reported on 10/4/2022 8/3/20   Provider, Historical   furosemide (LASIX) 40 mg tablet Take 1 Tab by mouth daily.   Patient not taking: Reported on 10/4/2022 12/20/19   Verle Babinski, MD       Allergies/Social/Family History:     No Known Allergies   Social History     Tobacco Use    Smoking status: Former     Packs/day: 0.50     Years: 5.00     Pack years: 2.50     Types: Cigarettes    Smokeless tobacco: Never   Substance Use Topics    Alcohol use: No      Family History   Problem Relation Age of Onset    Diabetes Mother             Objective:   Vital Signs:  Visit Vitals  /86 (BP 1 Location: Left upper arm, BP Patient Position: At rest)   Pulse 88 Temp 97.7 °F (36.5 °C)   Resp 15   Ht 5' 8\" (1.727 m)   Wt 145.2 kg (320 lb)   SpO2 98%   BMI 48.66 kg/m²    O2 Flow Rate (L/min): 3 l/min O2 Device: Nasal cannula Temp (24hrs), Av °F (36.7 °C), Min:97.3 °F (36.3 °C), Max:98.5 °F (36.9 °C)           Intake/Output:     Intake/Output Summary (Last 24 hours) at 10/21/2022 1111  Last data filed at 10/21/2022 1043  Gross per 24 hour   Intake 980 ml   Output 3350 ml   Net -2370 ml         Physical Exam:   General:  Drowsy, no distress, appears stated age. Head:  Normocephalic, without obvious abnormality, atraumatic. Eyes:  Conjunctivae/corneas clear. PERRL   Neck: Supple, symmetrical, no adenopathy, no carotid bruit and no JVD. Lungs:   Diminished breath sounds   Chest wall:  No tenderness or deformity. Heart:  Regular rate and rhythm, S1-S2 normal, no murmur, no click, rub or gallop. Abdomen:   Soft, non-tender. Bowel sounds present. No masses,  No organomegaly. Extremities: Atraumatic, no cyanosis, bilateral 4+ edema   Pulses: Palpable   Skin: No rashes or lesions   Neurologic: Grossly nonfocal        LABS AND  DATA: Personally reviewed  Recent Labs     10/19/22  0613   WBC 7.8   HGB 11.8*   HCT 37.9          Recent Labs     10/21/22  0500 10/20/22  0448 10/19/22  0613   * 134* 134*   K 4.4 4.4 4.4    99 98   CO2 30 33* 31   BUN 12 13 15   CREA 1.21 1.15 1.01   GLU 91 101* 101*   CA 9.2 9.4 9.3   MG  --   --  2.4   PHOS  --   --  3.8       No results for input(s): AP, TBIL, TP, ALB, GLOB, AML, LPSE in the last 72 hours. No lab exists for component: SGOT, GPT, AMYP    No results for input(s): INR, PTP, APTT, INREXT, INREXT in the last 72 hours. No results for input(s): PHI, PCO2I, PO2I, FIO2I in the last 72 hours. No results for input(s): CPK, CKMB, TROIQ, BNPP in the last 72 hours.       MEDS: Reviewed  Current Facility-Administered Medications   Medication Dose Route Frequency    enoxaparin (LOVENOX) injection 140 mg  1 mg/kg SubCUTAneous Q12H    LORazepam (ATIVAN) tablet 1 mg  1 mg Oral QHS PRN    polyethylene glycol (MIRALAX) packet 17 g  17 g Oral DAILY    vancomycin (VANCOCIN) 1,250 mg in 0.9% sodium chloride 250 mL ([id number])  1,250 mg IntraVENous Q8H    furosemide (LASIX) injection 40 mg  40 mg IntraVENous DAILY    HYDROmorphone (DILAUDID) injection 1 mg  1 mg IntraVENous Q4H PRN    carvediloL (COREG) tablet 25 mg  25 mg Oral BID WITH MEALS    sodium chloride (NS) flush 5-40 mL  5-40 mL IntraVENous Q8H    sodium chloride (NS) flush 5-40 mL  5-40 mL IntraVENous PRN    acetaminophen (TYLENOL) tablet 650 mg  650 mg Oral Q6H PRN    Or    acetaminophen (TYLENOL) suppository 650 mg  650 mg Rectal Q6H PRN    ondansetron (ZOFRAN ODT) tablet 4 mg  4 mg Oral Q8H PRN    Or    ondansetron (ZOFRAN) injection 4 mg  4 mg IntraVENous Q6H PRN    oxyCODONE-acetaminophen (PERCOCET 10)  mg per tablet 1 Tablet  1 Tablet Oral Q4H PRN    vancomycin - pharmacy to dose   Other Rx Dosing/Monitoring    hydrALAZINE (APRESOLINE) 20 mg/mL injection 10 mg  10 mg IntraVENous Q6H PRN       Chest Imaging: personally reviewed and report checked  Results from Hospital Encounter encounter on 10/12/22    XR CHEST PORT    Narrative  EXAM:  XR CHEST PORT    INDICATION:   dyspnea    COMPARISON: Chest radiograph 10/12/2022. FINDINGS: AP radiograph of the chest was obtained. No evidence of focal consolidation. No pleural effusion or pneumothorax. Heart,  fannie, mediastinum are within normal limits. No acute osseous abnormalities. Impression  No acute cardiopulmonary process. Results from [place] encounter on 10/12/22    CT LOW EXT LT W CONT    Narrative  EXAM: CT LOW EXT LT W CONT    INDICATION: Left leg swelling, rule out abscess, compartment    COMPARISON: Left lower extremity venous duplex 7/15/2018    CONTRAST: 100 mL of Isovue-300.     TECHNIQUE: Helical CT of the left lower extremity (from the distal femur through  the toes) during uneventful rapid bolus intravenous contrast administration. Coronal and Sagittal reformats were generated. Images reviewed in soft tissue  and bone windows. CT dose reduction was achieved through the use of a  standardized protocol tailored for this examination and automatic exposure  control for dose modulation. FINDINGS: Bones: Normal bone mineralization. No fracture, dislocation, or  periosteal reaction. Joint fluid: None. Articulations: Mild osteoarthritis involving the knee, ankle, and foot. Tendons: No full-thickness tendon tear. Muscles: No intramuscular hematoma. No focal atrophy. Soft tissues: Diffuse circumferential skin thickening and subcutaneous fat  stranding/edema and swelling in the lower leg, compatible with cellulitis. No  focal fluid collection to suggest abscess. 16 mm x 7 mm x 5 mm focus of nodular  cutaneous enhancement at the medial hindfoot (302-187, 304-208). Impression  Lower leg cellulitis. No abscess. Focal nodular cutaneous enhancing focus in the  medial hindfoot, correlate clinically. Tele- reviewed    Medical decision making:   I have reviewed the flowsheet and previous day's notes  Patient has acute or chronic illness that poses a threat to life or bodily function  Review and order of Clinical lab tests  Review and Order of Radiology tests  Independent visualization of Image  Reviewed Oxygen/ NiPPV  High Risk Drug therapy requiring intensive monitoring for toxicity: eg steroids, pressors, antibiotics        Thank you for allowing me to participate in this patient's care.     Ady Goncalves PA-C      [facility] [address] [place] [address]

## 2022-10-27 ENCOUNTER — HOSPITAL ENCOUNTER (EMERGENCY)
Age: 47
Discharge: HOME | End: 2022-10-27
Attending: STUDENT IN AN ORGANIZED HEALTH CARE EDUCATION/TRAINING PROGRAM
Payer: MEDICAID

## 2022-10-27 VITALS
SYSTOLIC BLOOD PRESSURE: 114 MMHG | HEART RATE: 93 BPM | DIASTOLIC BLOOD PRESSURE: 68 MMHG | TEMPERATURE: 97.5 F | RESPIRATION RATE: 22 BRPM | OXYGEN SATURATION: 97 %

## 2022-10-27 DIAGNOSIS — T14.8XXA BLEEDING FROM WOUND: Primary | ICD-10-CM

## 2022-10-27 DIAGNOSIS — Z79.01 ANTICOAGULATED: ICD-10-CM

## 2022-10-27 PROCEDURE — 99283 EMERGENCY DEPT VISIT LOW MDM: CPT

## 2022-10-27 PROCEDURE — 74011000250 HC RX REV CODE- 250: Performed by: STUDENT IN AN ORGANIZED HEALTH CARE EDUCATION/TRAINING PROGRAM

## 2022-10-27 RX ORDER — TRANEXAMIC ACID 100 MG/ML
1 INJECTION, SOLUTION INTRAVENOUS ONCE
Status: COMPLETED | OUTPATIENT
Start: 2022-10-27 | End: 2022-10-27

## 2022-10-27 RX ADMIN — TRANEXAMIC ACID 1000 MG: 1 INJECTION, SOLUTION INTRAVENOUS at 11:17

## 2022-10-27 NOTE — ED TRIAGE NOTES
Pt had cyst cut open on his leg and they were closed fine till his leg got his yesterday and the wound opened and started bleeding.

## 2022-10-27 NOTE — ED NOTES
Patient given dc paperwork and expressed verbal understanding. Patient ambulated from ed wo incident.

## 2022-10-27 NOTE — DISCHARGE INSTRUCTIONS
Should bleeding recur- please hold direct steady pressure on wound for ~20 minutes. Follow up with surgical specialist for wound reassessment.
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
## 2022-11-03 ENCOUNTER — OFFICE VISIT (OUTPATIENT)
Dept: SURGERY | Age: 47
End: 2022-11-03

## 2022-11-03 VITALS
BODY MASS INDEX: 47.04 KG/M2 | SYSTOLIC BLOOD PRESSURE: 100 MMHG | TEMPERATURE: 98 F | WEIGHT: 310.4 LBS | RESPIRATION RATE: 19 BRPM | DIASTOLIC BLOOD PRESSURE: 65 MMHG | OXYGEN SATURATION: 94 % | HEIGHT: 68 IN | HEART RATE: 80 BPM

## 2022-11-03 DIAGNOSIS — L03.116 CELLULITIS OF LEFT LOWER EXTREMITY: Primary | ICD-10-CM

## 2022-11-03 PROCEDURE — 99024 POSTOP FOLLOW-UP VISIT: CPT | Performed by: PHYSICIAN ASSISTANT

## 2022-11-03 RX ORDER — DOXYCYCLINE 100 MG/1
100 TABLET ORAL 2 TIMES DAILY
Qty: 20 TABLET | Refills: 0 | Status: SHIPPED | OUTPATIENT
Start: 2022-11-03

## 2022-11-03 NOTE — PROGRESS NOTES
Identified pt with two pt identifiers (name and ). Reviewed chart in preparation for visit and have obtained necessary documentation. Demetrio Kemp is a 56 y.o. male  Chief Complaint   Patient presents with    Wound Check     Abscess LLE      Visit Vitals  /65 (BP 1 Location: Left upper arm, BP Patient Position: Sitting, BP Cuff Size: Adult long)   Pulse 80   Temp 98 °F (36.7 °C) (Oral)   Resp 19   Ht 5' 8\" (1.727 m)   Wt 310 lb 6.4 oz (140.8 kg)   SpO2 94%   BMI 47.20 kg/m²       1. Have you been to the ER, urgent care clinic since your last visit? Hospitalized since your last visit? No    2. Have you seen or consulted any other health care providers outside of the [address] [place] [address] since your last visit? Include any pap smears or colon screening.  No

## 2022-11-03 NOTE — PROGRESS NOTES
Subjective: Altagracia Waldron is a 56 y.o. male presents for postop care 2 weeks following Left lower extremity abscesses I&D on 10/18/2022. He completed his coarse of Augmentin yesterday. He was never able to obtain Vyvox due to insurance coverage. [facility] every 2 days with dressing changes. Denies fevers or chills. Denies CP or SOB. Remains on anticoagulation for DVT. Appetite is normal. Eating a regular diet without difficulty. Bowel movements are regular. Pain is controlled without any medications. .    Patient has an advanced directive: NO  Mr. Kwaku Guerrero has a reminder for a \"due or due soon\" health maintenance. I have asked that he contact his primary care provider for follow-up on this health maintenance. Objective:     Visit Vitals  /65 (BP 1 Location: Left upper arm, BP Patient Position: Sitting, BP Cuff Size: Adult long)   Pulse 80   Temp 98 °F (36.7 °C) (Oral)   Resp 19   Ht 5' 8\" (1.727 m)   Wt 310 lb 6.4 oz (140.8 kg)   SpO2 94%   BMI 47.20 kg/m²       General:  alert, cooperative, no distress, appears stated age   [de-identified]: No resp distress and normal respiratory effort   Abdomen:   soft, non-tender   3 LLE wounds: healing well, no malodorous drainage, birgit removed and beefy red base present  Each wound open and shallow. Less than 2mm deep     Assessment:     Doing well postoperatively. Plan/Recommendations/Medical Decision Making:     Continue local wound care & HH as prescribed. Given bottle of 1/2\" packing. Doxy 100mg BID x 10 days  Encouraged to follow-up with ID (Dr. Brizuela Imus office)  Reminded of Pulmonary recs and to follow-up with Pulm  Offered OV in 10-14 days--declined  RTC PRN    Mr. Kwaku Guerrero verbalized understanding and questions were answered to the best of my knowledge and ability. We discussed the importance of proper diet and 30 minutes/day of proper exercise. Healthy diet educational materials were provided.       Thank you for allowing us to participate in the care of your patient.     > 25 minutes were spent with patient with greater than 50% of that time spent face to face counseling    Kailee Tinsley, [facility] Surgery  11/3/2022

## 2022-11-07 NOTE — PROGRESS NOTES
Physician Progress Note      Hitesh King  CSN #:                  [id number]  :                       1975  ADMIT DATE:       10/12/2022 10:42 PM  [address] DATE:        10/21/2022 2:43 PM  RESPONDING  PROVIDER #:        Alcon Marshall          QUERY TEXT:    Patient admitted with left lower extremity abscess. Per Op note dated 10/18/2022 documentation of I&D. To accurately reflect the procedure performed please further specify the depth of tissue incised and drained: The medical record reflects the following:  Risk Factors: 46yo left lower extremity abscess    Clinical Indicators:  10/15 Left Lower Extremity US  Cellulitis. 2 complex fluid collections in the subcutaneous soft tissues concerning for abscesses    Op note  The area was opened sharply, and a moderate amount of purulent bloody material was evacuated. Hemostasis was achieved, then the area was irrigated with normal saline, packed with silver packing strips, and covered with a dry dressing    Treatment: incision and drainage with silver packing strips, US pre and post surgery, Left lower extremity CT    Thank you,  Eric Draper  [phone]  I am also available via [employer]. Options provided:  -- Skin only  -- Subcutaneous tissue  -- Fascia  -- Muscle  -- Other - I will add my own diagnosis  -- Disagree - Not applicable / Not valid  -- Disagree - Clinically unable to determine / Unknown  -- Refer to Clinical Documentation Reviewer    PROVIDER RESPONSE TEXT:    Addendum to 10/18/2022 procedure note: This depth of the drainage to left lower extremity was skin only.     Query created by: Jenniffer Downey on 2022 9:59 AM      Electronically signed by:  Alcon Marshall 2022 7:28 AM

## 2022-11-11 ENCOUNTER — HOSPITAL ENCOUNTER (OUTPATIENT)
Dept: WOUND CARE | Age: 47
Discharge: HOME | End: 2022-11-11
Payer: MEDICAID

## 2022-11-11 VITALS
TEMPERATURE: 98.4 F | DIASTOLIC BLOOD PRESSURE: 61 MMHG | HEART RATE: 78 BPM | SYSTOLIC BLOOD PRESSURE: 93 MMHG | RESPIRATION RATE: 18 BRPM

## 2022-11-11 DIAGNOSIS — L97.922 NON-PRESSURE CHRONIC ULCER OF LEFT LOWER LEG WITH FAT LAYER EXPOSED (HCC): ICD-10-CM

## 2022-11-11 DIAGNOSIS — L97.922 NON-PRESSURE CHRONIC ULCER OF LEFT LOWER LEG WITH FAT LAYER EXPOSED (HCC): Primary | ICD-10-CM

## 2022-11-11 DIAGNOSIS — R60.0 BILATERAL LEG EDEMA: ICD-10-CM

## 2022-11-11 PROCEDURE — 99213 OFFICE O/P EST LOW 20 MIN: CPT

## 2022-11-11 PROCEDURE — 99203 OFFICE O/P NEW LOW 30 MIN: CPT | Performed by: SURGERY

## 2022-11-11 RX ORDER — BACITRACIN ZINC AND POLYMYXIN B SULFATE 500; 1000 [USP'U]/G; [USP'U]/G
OINTMENT TOPICAL ONCE
OUTPATIENT
Start: 2022-11-11 | End: 2022-11-11

## 2022-11-11 RX ORDER — LIDOCAINE HYDROCHLORIDE 20 MG/ML
JELLY TOPICAL ONCE
OUTPATIENT
Start: 2022-11-11 | End: 2022-11-11

## 2022-11-11 RX ORDER — LIDOCAINE 40 MG/G
CREAM TOPICAL ONCE
OUTPATIENT
Start: 2022-11-11 | End: 2022-11-11

## 2022-11-11 RX ORDER — CLOBETASOL PROPIONATE 0.5 MG/G
OINTMENT TOPICAL ONCE
Status: CANCELLED | OUTPATIENT
Start: 2022-11-11 | End: 2022-11-11

## 2022-11-11 RX ORDER — AMOXICILLIN AND CLAVULANATE POTASSIUM 875; 125 MG/1; MG/1
1 TABLET, FILM COATED ORAL EVERY 12 HOURS
COMMUNITY

## 2022-11-11 RX ORDER — BACITRACIN 500 [USP'U]/G
OINTMENT TOPICAL ONCE
OUTPATIENT
Start: 2022-11-11 | End: 2022-11-11

## 2022-11-11 RX ORDER — BACITRACIN ZINC AND POLYMYXIN B SULFATE 500; 1000 [USP'U]/G; [USP'U]/G
OINTMENT TOPICAL ONCE
Status: CANCELLED | OUTPATIENT
Start: 2022-11-11 | End: 2022-11-11

## 2022-11-11 RX ORDER — TRIAMCINOLONE ACETONIDE 1 MG/G
OINTMENT TOPICAL ONCE
Status: CANCELLED | OUTPATIENT
Start: 2022-11-11 | End: 2022-11-11

## 2022-11-11 RX ORDER — LIDOCAINE HYDROCHLORIDE 40 MG/ML
SOLUTION TOPICAL ONCE
OUTPATIENT
Start: 2022-11-11 | End: 2022-11-11

## 2022-11-11 RX ORDER — CLOBETASOL PROPIONATE 0.5 MG/G
OINTMENT TOPICAL ONCE
OUTPATIENT
Start: 2022-11-11 | End: 2022-11-11

## 2022-11-11 RX ORDER — LIDOCAINE 40 MG/G
CREAM TOPICAL ONCE
Status: CANCELLED | OUTPATIENT
Start: 2022-11-11 | End: 2022-11-11

## 2022-11-11 RX ORDER — SILVER SULFADIAZINE 10 G/1000G
CREAM TOPICAL ONCE
OUTPATIENT
Start: 2022-11-11 | End: 2022-11-11

## 2022-11-11 RX ORDER — LIDOCAINE HYDROCHLORIDE 20 MG/ML
JELLY TOPICAL ONCE
Status: CANCELLED | OUTPATIENT
Start: 2022-11-11 | End: 2022-11-11

## 2022-11-11 RX ORDER — BETAMETHASONE DIPROPIONATE 0.5 MG/G
OINTMENT TOPICAL ONCE
Status: CANCELLED | OUTPATIENT
Start: 2022-11-11 | End: 2022-11-11

## 2022-11-11 RX ORDER — MUPIROCIN 20 MG/G
OINTMENT TOPICAL ONCE
Status: CANCELLED | OUTPATIENT
Start: 2022-11-11 | End: 2022-11-11

## 2022-11-11 RX ORDER — SILVER SULFADIAZINE 10 G/1000G
CREAM TOPICAL ONCE
Status: CANCELLED | OUTPATIENT
Start: 2022-11-11 | End: 2022-11-11

## 2022-11-11 RX ORDER — GENTAMICIN SULFATE 1 MG/G
OINTMENT TOPICAL ONCE
Status: CANCELLED | OUTPATIENT
Start: 2022-11-11 | End: 2022-11-11

## 2022-11-11 RX ORDER — MUPIROCIN 20 MG/G
OINTMENT TOPICAL ONCE
OUTPATIENT
Start: 2022-11-11 | End: 2022-11-11

## 2022-11-11 RX ORDER — BETAMETHASONE DIPROPIONATE 0.5 MG/G
OINTMENT TOPICAL ONCE
OUTPATIENT
Start: 2022-11-11 | End: 2022-11-11

## 2022-11-11 RX ORDER — BACITRACIN 500 [USP'U]/G
OINTMENT TOPICAL ONCE
Status: CANCELLED | OUTPATIENT
Start: 2022-11-11 | End: 2022-11-11

## 2022-11-11 RX ORDER — LIDOCAINE HYDROCHLORIDE 40 MG/ML
SOLUTION TOPICAL ONCE
Status: CANCELLED | OUTPATIENT
Start: 2022-11-11 | End: 2022-11-11

## 2022-11-11 RX ORDER — TRIAMCINOLONE ACETONIDE 1 MG/G
OINTMENT TOPICAL ONCE
OUTPATIENT
Start: 2022-11-11 | End: 2022-11-11

## 2022-11-11 RX ORDER — GENTAMICIN SULFATE 1 MG/G
OINTMENT TOPICAL ONCE
OUTPATIENT
Start: 2022-11-11 | End: 2022-11-11

## 2022-11-11 NOTE — WOUND CARE
11/11/22 0923   Wound Leg lower Anterior;Proximal 11/11/22   Date First Assessed/Time First Assessed: 11/11/22 0919   Wound Approximate Age at First Assessment (Weeks): 3 weeks  Primary Wound Type: (c)   Location: Leg lower  Wound Location Orientation: Anterior;Proximal  Date of First Observation: 11/11/22   Wound Image    Wound Etiology   (inflamation/ I&D)   Dressing Status Old drainage noted   Cleansed Cleansed with saline   Wound Length (cm) 1.4 cm   Wound Width (cm) 0.6 cm   Wound Depth (cm) 0.3 cm   Wound Surface Area (cm^2) 0.84 cm^2   Wound Volume (cm^3) 0.252 cm^3   Wound Assessment Pink/red   Drainage Amount Moderate   Drainage Description Serosanguinous   Wound Odor None   Magdalena-Wound/Incision Assessment Edematous   Edges Defined edges   Wound Thickness Description Full thickness   Wound Leg lower Anterior;Distal 11/11/22   Date First Assessed/Time First Assessed: 11/11/22 0921   Wound Approximate Age at First Assessment (Weeks): 3 weeks  Primary Wound Type: (c)   Location: Leg lower  Wound Location Orientation: Anterior;Distal  Date of First Observation: 11/11/22   Wound Image    Wound Etiology   (inflamation/ I&D)   Dressing Status Old drainage noted   Cleansed Cleansed with saline   Wound Length (cm) 0.8 cm   Wound Width (cm) 2.1 cm   Wound Depth (cm) 1 cm   Wound Surface Area (cm^2) 1.68 cm^2   Wound Volume (cm^3) 1.68 cm^3   Wound Assessment Pink/red   Drainage Amount Moderate   Drainage Description Serosanguinous   Wound Odor None   Magdalena-Wound/Incision Assessment Edematous   Edges Defined edges   Wound Thickness Description Full thickness   Wound Leg lower Lateral 11/11/22   Date First Assessed/Time First Assessed: 11/11/22 0922   Wound Approximate Age at First Assessment (Weeks): 3 weeks  Primary Wound Type: (c)   Location: Leg lower  Wound Location Orientation: Lateral  Date of First Observation: 11/11/22   Wound Image    Wound Etiology   (inflamation/ I&D)   Dressing Status Old drainage noted Cleansed Cleansed with saline   Wound Length (cm) 1.3 cm   Wound Width (cm) 1.4 cm   Wound Depth (cm) 0.8 cm   Wound Surface Area (cm^2) 1.82 cm^2   Wound Volume (cm^3) 1. 456 cm^3   Wound Assessment Pink/red   Drainage Amount Moderate   Drainage Description Serosanguinous   Wound Odor None   Magdalena-Wound/Incision Assessment Edematous   Edges Defined edges   Wound Thickness Description Full thickness   Visit Vitals  BP 93/61 (BP 1 Location: Left upper arm, BP Patient Position: At rest)   Pulse 78   Temp 98.4 °F (36.9 °C)   Resp 18

## 2022-11-11 NOTE — DISCHARGE INSTRUCTIONS
Discharge Instructions [address], [address] [place] [address] Katie Paniagua, [id number]  Telephone: [phone]    NAME:  Saul Shields  YOB: 1975  MEDICAL RECORD NUMBER:  [id number]  DATE:  11/11/2022  WOUND CARE ORDERS:  Left leg wounds  :Cleanse with saline , Pack with silver alginate, cover with bordered gauze. Care to be done 3 x week and as needed for soiling. F/U in 3 weeks. Try to avoid concentrated sweets, and foods with sugar. Try to avoid drinking too much fluid. Moderate amts with meals, and very little between meals. TREATMENT ORDERS:    Elevate leg(s) above the level of the heart when sitting. Avoid prolonged standing in one place. Do no get dressing/wrap wet. Follow Diet as prescribed:   [] Diet as tolerated: [] Calorie Diabetic Diet: Low carb and no Sugar [] No Added Salt:  [] Increase Protein: [] Limit the amount of liquid you are drinking and avoid drinking in between meals           Return Appointment:  [x] Return Appointment: With DR Rach Sanches  in  3 Week(s)  [] Nurse Visit : *** days  [] Ordered tests:    Electronically signed Nina Sorenson RN on 11/11/2022 at 9:47 AM     [facility] Information: Should you experience any significant changes in your wound(s) or have questions about your wound care, please contact the [address] [place] [address] at [address] [place] [address] 8:00 am - 4:30. If you need help with your wound outside these hours and cannot wait until we are again available, contact your PCP or go to the hospital emergency room. PLEASE NOTE: IF YOU ARE UNABLE TO OBTAIN WOUND SUPPLIES, CONTINUE TO USE THE SUPPLIES YOU HAVE AVAILABLE UNTIL YOU ARE ABLE TO REACH US. IT IS MOST IMPORTANT TO KEEP THE WOUND COVERED AT ALL TIMES.      Physician Signature:_______________________    Date: ___________ Time:  ____________

## 2022-11-11 NOTE — H&P
[address]  HISTORY AND PHYSICAL    Name:  José Miguel Blake  MR#:  [id number]  :  1975  ACCOUNT #:  [de-identified]  ADMIT DATE:  2022    HISTORY OF PRESENT ILLNESS:  The patient is a 60-year-old man who is referred to the [address] [place] [address] regarding wounds on the left lower leg. The patient had three abscesses on the left lower leg and was admitted to the hospital from 10/12/2022 to 10/21/2022. He had operative incision and drainage of the abscesses. The wounds are currently being treated by home health with packing changes two times per week. The patient had not seen a physician for a time, but now has had a primary care physician for several weeks. The patient does not have history of diabetes, but does have history of prediabetes with hemoglobin A1c of 6.0. He reports he is avoiding carbohydrates now. The patient does not have history of MI or coronary intervention. He does not describe anginal symptoms. He does report some dyspnea with walking a long distance. He is not short of breath at rest or when lying down. The patient's medications include furosemide 40 mg p.o. daily. This does produce a diuresis. The patient had been drinking a good deal of water. The patient's past medical history includes sleep apnea. He uses a trilogy device at night and says this has greatly improved his sleep. Without the device, he tended to both sleepwalk and fall down while sleepwalking. The patient has never smoked. Reported weight 310 pounds, height 5 feet 8 inches. PHYSICAL EXAMINATION:  GENERAL:  The patient is an alert man in no acute distress. VITAL SIGNS:  Blood pressure 93/61, pulse 78, respirations 18, temperature 98.4. HEAD AND NECK:  No jaundice. LUNGS:  Clear bilaterally without rales, rhonchi, or wheezes. HEART:  Regular without murmur, gallop, or rub. NEURO:  The patient is alert and oriented. He moves all extremities equally.   Facial movement is symmetrical.  Speech is normal.    WOUND EXAMINATION:  Examination of the right lower extremity revealed 2+ dorsalis pedis pulse. There was trace to 1+ edema in the right lower leg. There were no ulcers in the right lower leg or foot. Examination of the left lower extremity revealed a 2+ dorsalis pedis pulse. There was trace to 1+ pitting edema in the left lower leg. There were a total of three wounds on the left lower leg. Anterior proximal wound was 1.4 x 0.6 x 0.3 cm in dimension with granulation and minimal slough. Anterior distal wound was 0.8 x 2.1 x 1.0 cm with granulation tissue and minimal slough. Lateral wound was 1.3 x 1.4 x 0.8 cm with granulation and minimal slough. Dressing ordered:  Pack wounds with silver alginate and cover with border foam dressing. Change dressings two times per week and p.r.n. Since the patient is taking furosemide because of his leg edema, I have recommended he drink moderate amounts of fluid with meals and only small amounts of fluid between meals. The patient will follow up in the [address] [place] [address] in three weeks. FINAL DIAGNOSES:  Nonpressure wounds in left lower leg with fat layer exposed at site of prior abscesses, bilateral leg edema.         Chloe Castro MD      [id number]  D:  11/11/2022 10:14  T:  11/11/2022 11:20  JOB #:  [id number]

## 2023-01-05 ENCOUNTER — HOSPITAL ENCOUNTER (OUTPATIENT)
Dept: WOUND CARE | Age: 48
Discharge: HOME | End: 2023-01-05
Payer: MEDICAID

## 2023-01-05 VITALS
RESPIRATION RATE: 16 BRPM | DIASTOLIC BLOOD PRESSURE: 92 MMHG | TEMPERATURE: 98.7 F | HEART RATE: 92 BPM | SYSTOLIC BLOOD PRESSURE: 135 MMHG

## 2023-01-05 DIAGNOSIS — R60.0 BILATERAL LEG EDEMA: ICD-10-CM

## 2023-01-05 DIAGNOSIS — L97.922 NON-PRESSURE CHRONIC ULCER OF LEFT LOWER LEG WITH FAT LAYER EXPOSED (HCC): Primary | ICD-10-CM

## 2023-01-05 PROCEDURE — 99213 OFFICE O/P EST LOW 20 MIN: CPT | Performed by: SURGERY

## 2023-01-05 PROCEDURE — 29581 APPL MULTLAYER CMPRN SYS LEG: CPT

## 2023-01-05 RX ORDER — LIDOCAINE HYDROCHLORIDE 20 MG/ML
JELLY TOPICAL ONCE
OUTPATIENT
Start: 2023-01-05 | End: 2023-01-05

## 2023-01-05 RX ORDER — LIDOCAINE HYDROCHLORIDE 40 MG/ML
SOLUTION TOPICAL ONCE
OUTPATIENT
Start: 2023-01-05 | End: 2023-01-05

## 2023-01-05 RX ORDER — BACITRACIN ZINC AND POLYMYXIN B SULFATE 500; 1000 [USP'U]/G; [USP'U]/G
OINTMENT TOPICAL ONCE
OUTPATIENT
Start: 2023-01-05 | End: 2023-01-05

## 2023-01-05 RX ORDER — CLOBETASOL PROPIONATE 0.5 MG/G
OINTMENT TOPICAL ONCE
OUTPATIENT
Start: 2023-01-05 | End: 2023-01-05

## 2023-01-05 RX ORDER — LIDOCAINE 40 MG/G
CREAM TOPICAL ONCE
OUTPATIENT
Start: 2023-01-05 | End: 2023-01-05

## 2023-01-05 RX ORDER — TRIAMCINOLONE ACETONIDE 1 MG/G
OINTMENT TOPICAL ONCE
OUTPATIENT
Start: 2023-01-05 | End: 2023-01-05

## 2023-01-05 RX ORDER — BACITRACIN 500 [USP'U]/G
OINTMENT TOPICAL ONCE
OUTPATIENT
Start: 2023-01-05 | End: 2023-01-05

## 2023-01-05 RX ORDER — MUPIROCIN 20 MG/G
OINTMENT TOPICAL ONCE
OUTPATIENT
Start: 2023-01-05 | End: 2023-01-05

## 2023-01-05 RX ORDER — SILVER SULFADIAZINE 10 G/1000G
CREAM TOPICAL ONCE
OUTPATIENT
Start: 2023-01-05 | End: 2023-01-05

## 2023-01-05 RX ORDER — GENTAMICIN SULFATE 1 MG/G
OINTMENT TOPICAL ONCE
OUTPATIENT
Start: 2023-01-05 | End: 2023-01-05

## 2023-01-05 RX ORDER — BETAMETHASONE DIPROPIONATE 0.5 MG/G
OINTMENT TOPICAL ONCE
OUTPATIENT
Start: 2023-01-05 | End: 2023-01-05

## 2023-01-05 NOTE — WOUND CARE
Multilayer Compression Wrap   (Not Unna) Below the Knee    NAME:  Jose David Snyder  YOB: 1975  MEDICAL RECORD NUMBER:  [id number]  DATE:  1/5/2023    Removed old Multilayer wrap if indicated and wash leg with mild soap/water. Applied moisturizing agent to dry skin as needed. Applied primary and secondary dressing as ordered. Applied multilayered dressing below the knee to left lower leg. Instructed patient/caregiver not to remove dressing and to keep it clean and dry. Instructed patient/caregiver on complications to report to provider, such as pain, numbness in toes, heavy drainage, and slippage of dressing. Instructed patient on purpose of compression dressing and on activity and exercise recommendations.       Electronically signed by Essence Carl LPN on 7/0/7846 at 4:13 AM

## 2023-01-05 NOTE — PROGRESS NOTES
HISTORY OF PRESENT ILLNESS:  The patient is a 43-year-old man who is referred to the [address] [place] [address] regarding wounds on the left lower leg. The patient was first seen at the [address] [place] [address] on 11/11/2022. Missed all follow up appointments until 1/5/2023. The patient had three abscesses on the left lower leg and was admitted to the hospital from 10/12/2022 to 10/21/2022. He had operative incision and drainage of the abscesses. The wounds are currently being treated by home health with packing changes two times per week. The patient had not seen a physician for a time, but now has a primary care physician. The patient does not have history of diabetes, but does have history of prediabetes with hemoglobin A1c of 6.0. He reports he is avoiding carbohydrates now. The patient does not have history of MI or coronary intervention. He does not describe anginal symptoms. He does report some dyspnea with walking a long distance. He is not short of breath at rest or when lying down. The patient's medications include furosemide 40 mg p.o. daily. This does produce a diuresis. The patient had been drinking a good deal of water. He reports decreasing fluid intake. The patient's past medical history includes sleep apnea. He uses a trilogy device at night and says this has greatly improved his sleep. Without the device, he tended to both sleepwalk and fall down while sleepwalking. The patient has never smoked. Dressing as of 11/11/2022:  Pack wounds with silver alginate and cover with border foam dressing. Change dressings two times per week and p.r.n. Has Home Health. Reported weight 310 pounds, height 5 feet 8 inches. PHYSICAL EXAMINATION:    GENERAL:  The patient is an alert man in no acute distress. WOUND EXAMINATION:  Examination of the right lower extremity revealed 2+ dorsalis pedis pulse. There was trace to 1+ edema in the right lower leg.   There were no ulcers in the right lower leg or foot. Examination of the left lower extremity revealed a 2+ dorsalis pedis pulse. There was 1+ pitting edema in the left lower leg. There were two wounds on the left lower leg. Anterior distal wound was 0.4 x 0.4 x 0.6 cm with granulation tissue. Lateral wound was 0.8 x 0.3 x 0.3 cm with granulation and slough and local tenderness. Lateral wound cultured. Since the patient is taking furosemide because of his leg edema, I have recommended he drink moderate amounts of fluid with meals and only small amounts of fluid between meals. Dressing ordered:  Pack wounds with silver alginate. Two layer compression wrap with calamine placed form base of toes to upper calf. Leave dressing in place. Use cast cover for showering. [facility]. The patient will follow up in the [address] [place] [address] in one week. FINAL DIAGNOSES:  Nonpressure wounds in left lower leg with fat layer exposed at site of prior abscesses, bilateral leg edema.            Lloyd Call MD

## 2023-01-05 NOTE — DISCHARGE INSTRUCTIONS
Discharge Instructions [address], [address] [place] [address] Katie Paniagua, [id number]  Telephone: [phone]    NAME:  Cee Sears  YOB: 1975  MEDICAL RECORD NUMBER:  [id number]  DATE:  1/5/2023  WOUND CARE ORDERS:  Left lower leg wounds :Cleanse with saline , apply primary dressing Silver Alginate cover with secondary dressing   abd pad . Apply 2 layers with Calamine  Pt./pcg/HH nurse to change (freq) once a week in clinic and as needed for compromise. F/U in 1 week. Home Health hold visits x 1 week until patient returns to clinic on 01/12/23    TREATMENT ORDERS:    Elevate leg(s) above the level of the heart when sitting. Avoid prolonged standing in one place. Do no get dressing/wrap wet. Follow Diet as prescribed:   [x] Diet as tolerated: [] Calorie Diabetic Diet: Low carb and no Sugar [x] No Added Salt:  [x] Increase Protein: [] Limit the amount of liquid you are drinking and avoid drinking in between meals           Return Appointment:  [x] Return Appointment: With DR Edgar Llanes  in  [facility])  [] Nurse Visit :  days  [] Ordered tests:    Electronically signed Nneka Briones RN on 1/5/2023 at 8:46 AM     [address] [facility] [address] Information: Should you experience any significant changes in your wound(s) or have questions about your wound care, please contact the [address] [place] [address] at [address] [place] [address] 8:00 am - 4:30. If you need help with your wound outside these hours and cannot wait until we are again available, contact your PCP or go to the hospital emergency room. PLEASE NOTE: IF YOU ARE UNABLE TO OBTAIN WOUND SUPPLIES, CONTINUE TO USE THE SUPPLIES YOU HAVE AVAILABLE UNTIL YOU ARE ABLE TO REACH US. IT IS MOST IMPORTANT TO KEEP THE WOUND COVERED AT ALL TIMES.      Physician Signature:_______________________    Date: ___________ Time:  ____________

## 2023-01-05 NOTE — WOUND CARE
01/05/23 0827   Wound Leg lower Anterior;Proximal 11/11/22   Date First Assessed/Time First Assessed: 11/11/22 0919   Wound Approximate Age at First Assessment (Weeks): 3 weeks  Primary Wound Type: (c)   Location: Leg lower  Wound Location Orientation: Anterior;Proximal  Date of First Observation: 11/11/22   Wound Image    Wound Length (cm) 0 cm   Wound Width (cm) 0 cm   Wound Depth (cm) 0 cm   Wound Surface Area (cm^2) 0 cm^2   Change in Wound Size % 100   Wound Volume (cm^3) 0 cm^3   Wound Healing % 100   Wound Assessment Epithelialization   Wound Leg lower Lateral 11/11/22   Date First Assessed/Time First Assessed: 11/11/22 0922   Wound Approximate Age at First Assessment (Weeks): 3 weeks  Primary Wound Type: (c)   Location: Leg lower  Wound Location Orientation: Lateral  Date of First Observation: 11/11/22   Wound Image    Wound Etiology   (inflamation)   Dressing Status Old drainage noted   Cleansed Cleansed with saline   Wound Length (cm) 0.8 cm   Wound Width (cm) 0.3 cm   Wound Depth (cm) 0.3 cm   Wound Surface Area (cm^2) 0.24 cm^2   Change in Wound Size % 86.81   Wound Volume (cm^3) 0.072 cm^3   Wound Healing % 95   Wound Assessment Pink/red   Drainage Amount Small   Drainage Description Serosanguinous   Wound Odor None   Magdalena-Wound/Incision Assessment Edematous   Edges Defined edges   Wound Thickness Description Full thickness   Wound Leg lower Anterior;Distal 11/11/22   Date First Assessed/Time First Assessed: 11/11/22 0921   Wound Approximate Age at First Assessment (Weeks): 3 weeks  Primary Wound Type: (c)   Location: Leg lower  Wound Location Orientation: Anterior;Distal  Date of First Observation: 11/11/22   Wound Image    Wound Etiology   (inflamation)   Dressing Status Old drainage noted   Cleansed Cleansed with saline   Wound Length (cm) 0.4 cm   Wound Width (cm) 0.4 cm   Wound Depth (cm) 0.6 cm   Wound Surface Area (cm^2) 0.16 cm^2   Change in Wound Size % 90.48   Wound Volume (cm^3) 0.096 cm^3 Wound Healing % 94   Wound Assessment Pink/red   Drainage Amount Small   Drainage Description Serous   Wound Odor None   Magdalena-Wound/Incision Assessment Edematous   Edges Defined edges   Wound Thickness Description Full thickness   Visit Vitals  BP (!) 135/92 (BP 1 Location: Left upper arm, BP Patient Position: At rest)   Pulse 92   Temp 98.7 °F (37.1 °C)   Resp 16

## 2023-01-11 ENCOUNTER — DOCUMENTATION ONLY (OUTPATIENT)
Dept: SURGERY | Age: 48
End: 2023-01-11

## 2023-01-11 NOTE — PROGRESS NOTES
[facility]    Wound culture of 1/5/2023 grew multiple gram negative rods and skin manuel, reflecting colonization. No treatment given.     Renny Keane MD

## 2023-01-12 ENCOUNTER — HOSPITAL ENCOUNTER (OUTPATIENT)
Dept: WOUND CARE | Age: 48
Discharge: HOME | End: 2023-01-12
Payer: MEDICAID

## 2023-01-12 VITALS
RESPIRATION RATE: 18 BRPM | DIASTOLIC BLOOD PRESSURE: 85 MMHG | SYSTOLIC BLOOD PRESSURE: 152 MMHG | TEMPERATURE: 98.6 F | HEART RATE: 82 BPM

## 2023-01-12 DIAGNOSIS — R60.0 BILATERAL LEG EDEMA: ICD-10-CM

## 2023-01-12 DIAGNOSIS — L97.922 NON-PRESSURE CHRONIC ULCER OF LEFT LOWER LEG WITH FAT LAYER EXPOSED (HCC): Primary | ICD-10-CM

## 2023-01-12 PROBLEM — L03.90 CELLULITIS: Status: RESOLVED | Noted: 2019-08-27 | Resolved: 2023-01-12

## 2023-01-12 PROBLEM — I87.2 VENOUS INSUFFICIENCY: Status: ACTIVE | Noted: 2023-01-12

## 2023-01-12 PROBLEM — J96.02 ACUTE RESPIRATORY FAILURE WITH HYPOXIA AND HYPERCAPNIA (HCC): Status: RESOLVED | Noted: 2022-10-13 | Resolved: 2023-01-12

## 2023-01-12 PROBLEM — J96.01 ACUTE RESPIRATORY FAILURE WITH HYPOXIA AND HYPERCAPNIA (HCC): Status: RESOLVED | Noted: 2022-10-13 | Resolved: 2023-01-12

## 2023-01-12 PROCEDURE — 99213 OFFICE O/P EST LOW 20 MIN: CPT | Performed by: SURGERY

## 2023-01-12 PROCEDURE — 29581 APPL MULTLAYER CMPRN SYS LEG: CPT

## 2023-01-12 RX ORDER — LIDOCAINE HYDROCHLORIDE 20 MG/ML
JELLY TOPICAL ONCE
OUTPATIENT
Start: 2023-01-12 | End: 2023-01-12

## 2023-01-12 RX ORDER — BACITRACIN 500 [USP'U]/G
OINTMENT TOPICAL ONCE
OUTPATIENT
Start: 2023-01-12 | End: 2023-01-12

## 2023-01-12 RX ORDER — TRIAMCINOLONE ACETONIDE 1 MG/G
OINTMENT TOPICAL ONCE
OUTPATIENT
Start: 2023-01-12 | End: 2023-01-12

## 2023-01-12 RX ORDER — SILVER SULFADIAZINE 10 G/1000G
CREAM TOPICAL ONCE
OUTPATIENT
Start: 2023-01-12 | End: 2023-01-12

## 2023-01-12 RX ORDER — MUPIROCIN 20 MG/G
OINTMENT TOPICAL ONCE
OUTPATIENT
Start: 2023-01-12 | End: 2023-01-12

## 2023-01-12 RX ORDER — LIDOCAINE HYDROCHLORIDE 40 MG/ML
SOLUTION TOPICAL ONCE
OUTPATIENT
Start: 2023-01-12 | End: 2023-01-12

## 2023-01-12 RX ORDER — BACITRACIN ZINC AND POLYMYXIN B SULFATE 500; 1000 [USP'U]/G; [USP'U]/G
OINTMENT TOPICAL ONCE
OUTPATIENT
Start: 2023-01-12 | End: 2023-01-12

## 2023-01-12 RX ORDER — GENTAMICIN SULFATE 1 MG/G
OINTMENT TOPICAL ONCE
OUTPATIENT
Start: 2023-01-12 | End: 2023-01-12

## 2023-01-12 RX ORDER — CLOBETASOL PROPIONATE 0.5 MG/G
OINTMENT TOPICAL ONCE
OUTPATIENT
Start: 2023-01-12 | End: 2023-01-12

## 2023-01-12 RX ORDER — LIDOCAINE 40 MG/G
CREAM TOPICAL ONCE
OUTPATIENT
Start: 2023-01-12 | End: 2023-01-12

## 2023-01-12 RX ORDER — BETAMETHASONE DIPROPIONATE 0.5 MG/G
OINTMENT TOPICAL ONCE
OUTPATIENT
Start: 2023-01-12 | End: 2023-01-12

## 2023-01-12 NOTE — DISCHARGE INSTRUCTIONS
Discharge Instructions   [facility]  [address], [address] [facility] [address]  [place], [address] [facility]  Telephone: [phone]    NAME:  Edil Peoples  YOB: 1975  MEDICAL RECORD NUMBER:  [id number]  DATE:  01/12/23    WOUND CARE ORDERS:  Left Lower Leg wounds :Cleanse with Normal Saline. Apply Silver Alginate, ensuring it comes in contact w/wound bed. Cover w/gauze or ABD pad & 2 layer compression wrap w/Calamine. Dressing to be changed 1 x week & as needed for compromise of dressing. Clinic to order [facility] compression garment. Patientt to RTC in 1 week. TREATMENT ORDERS:    Elevate leg(s) when sitting. Elevate legs above level of heart, when possible to aid in controlling edema/leg swelling   Avoid prolonged standing in one place. Do not get dressing/wrap wet. Follow Diet as prescribed:   [] Diet as tolerated: [] Diabetic Diet: Low carb and no Sugar   [x] No Added Salt  [] Increase Protein   [] Limit the amount of liquid you are drinking and avoid drinking in between meals     Return Appointment:  [x] Return Appointment: With DR Baduilio Heard  in 1 [facility])     Electronically signed Misha Lucia RN on 1/12/2023 at [address] [place] [address] Place: Should you experience any significant changes in your wound(s) or have questions about your wound care, please contact the [address] [place] [address] at [address] [place] [address] 8:00 am - 4:30. If you need help with your wound outside these hours and cannot wait until we are again available, contact your PCP or go to the hospital emergency room. PLEASE NOTE: IF YOU ARE UNABLE TO OBTAIN WOUND SUPPLIES, CONTINUE TO USE THE SUPPLIES YOU HAVE AVAILABLE UNTIL YOU ARE ABLE TO REACH US. IT IS MOST IMPORTANT TO KEEP THE WOUND COVERED AT ALL TIMES.      Physician Signature:_______________________    Date: ___________ Time:  ____________

## 2023-01-12 NOTE — WOUND CARE
Multilayer Compression Wrap   (Not Unna) Below the Knee    NAME:  Wanda Gambino  YOB: 1975  MEDICAL RECORD NUMBER:  [id number]  DATE:  1/12/2023    Removed old Multilayer wrap if indicated and wash leg with mild soap/water. Applied moisturizing agent to dry skin as needed. Applied primary and secondary dressing as ordered. Applied multilayered dressing below the knee to left lower leg. Instructed patient/caregiver not to remove dressing and to keep it clean and dry. Instructed patient/caregiver on complications to report to provider, such as pain, numbness in toes, heavy drainage, and slippage of dressing. Instructed patient on purpose of compression dressing and on activity and exercise recommendations. 01/12/23 0855   Wound Leg lower Lateral;Left 11/11/22   Date First Assessed/Time First Assessed: 11/11/22 0922   Wound Approximate Age at First Assessment (Weeks): 3 weeks  Primary Wound Type: (c)   Location: Leg lower  Wound Location Orientation: Lateral;Left  Date of First Observation: 11/11/22   Dressing Status New dressing applied   Cleansed Soap and water;Irrigated with saline   Dressing/Treatment Alginate with Ag;ABD pad; Other (Comment)  (Silver Alginate, ABD, 2 layer wrap w/Calamine)   Wound Leg lower Anterior;Distal 11/11/22   Date First Assessed/Time First Assessed: 11/11/22 0921   Wound Approximate Age at First Assessment (Weeks): 3 weeks  Primary Wound Type: (c)   Location: Leg lower  Wound Location Orientation: Anterior;Distal  Date of First Observation: 11/11/22   Dressing Status New dressing applied   Cleansed Soap and water;Irrigated with saline   Dressing/Treatment Silicone pad;ABD pad; Other (Comment)  (Silver Alginate, ABD, 2 layer wrap w/Calamine)       Electronically signed by Esme Troncoso RN on 1/12/2023 at 9:00 AM

## 2023-01-12 NOTE — WOUND CARE
01/12/23 0822   Wound Leg lower Lateral;Left 11/11/22   Date First Assessed/Time First Assessed: 11/11/22 0922   Wound Approximate Age at First Assessment (Weeks): 3 weeks  Primary Wound Type: (c)   Location: Leg lower  Wound Location Orientation: Lateral;Left  Date of First Observation: 11/11/22   Wound Image    Dressing Status Old drainage noted   Cleansed Soap and water   Wound Length (cm) 0.2 cm   Wound Width (cm) 0.3 cm   Wound Depth (cm) 0.2 cm   Wound Surface Area (cm^2) 0.06 cm^2   Change in Wound Size % 96.7   Wound Volume (cm^3) 0.012 cm^3   Wound Healing % 99   Wound Assessment Pink/red   Drainage Amount Small   Drainage Description Serous   Wound Odor None   Magdalena-Wound/Incision Assessment Edematous   Edges Defined edges   Wound Thickness Description Full thickness   Wound Leg lower Anterior;Distal 11/11/22   Date First Assessed/Time First Assessed: 11/11/22 0921   Wound Approximate Age at First Assessment (Weeks): 3 weeks  Primary Wound Type: (c)   Location: Leg lower  Wound Location Orientation: Anterior;Distal  Date of First Observation: 11/11/22   Wound Image    Dressing Status Old drainage noted   Cleansed Soap and water   Wound Length (cm) 0.2 cm   Wound Width (cm) 0.3 cm   Wound Depth (cm) 0.5 cm   Wound Surface Area (cm^2) 0.06 cm^2   Change in Wound Size % 96.43   Wound Volume (cm^3) 0.03 cm^3   Wound Healing % 98   Wound Assessment Pink/red   Drainage Amount Small   Drainage Description Serous   Wound Odor None   Magdalena-Wound/Incision Assessment Edematous   Edges Defined edges   Wound Thickness Description Full thickness   Visit Vitals  BP (!) 152/85 (BP 1 Location: Left upper arm, BP Patient Position: At rest)   Temp 98.6 °F (37 °C)   Resp 18     HR 82

## 2023-01-12 NOTE — PROGRESS NOTES
HISTORY OF PRESENT ILLNESS:  The patient is a 81-year-old man who is referred to the [address] [place] [address] regarding wounds on the left lower leg. The patient was first seen at the [address] [place] [address] on 11/11/2022. Missed all follow up appointments until 1/5/2023. The patient had three abscesses on the left lower leg and was admitted to the hospital from 10/12/2022 to 10/21/2022. He had operative incision and drainage of the abscesses. The wounds are currently being treated by home health with packing changes two times per week. The patient had not seen a physician for a time, but now has a primary care physician. The patient does not have history of diabetes, but does have history of prediabetes with hemoglobin A1c of 6.0. He reports he is avoiding carbohydrates now. The patient does not have history of MI or coronary intervention. He does not describe anginal symptoms. He does report some dyspnea with walking a long distance. He is not short of breath at rest or when lying down. The patient's medications include furosemide 40 mg p.o. daily. This does produce a diuresis. The patient had been drinking a good deal of water. He reports decreasing fluid intake. The patient's past medical history includes sleep apnea. He uses a trilogy device at night and says this has greatly improved his sleep. Without the device, he tended to both sleepwalk and fall down while sleepwalking. The patient has never smoked. Wound culture of 1/5/2023 grew multiple gram negative rods and skin manuel, reflecting colonization. No treatment given. Dressing as of 11/11/2022:  Pack wounds with silver alginate and cover with border foam dressing. Change dressings two times per week and p.r.n. Has Home Health. Reported weight 310 pounds, height 5 feet 8 inches. PHYSICAL EXAMINATION:     GENERAL:  The patient is an alert man in no acute distress.      WOUND EXAMINATION:  Examination of the right lower extremity revealed 2+ dorsalis pedis pulse. There was trace to 1+ edema in the right lower leg. There were no ulcers in the right lower leg or foot. Examination of the left lower extremity revealed a 2+ dorsalis pedis pulse. There was 1+ pitting edema in the left lower leg. There were two wounds on the left lower leg. Anterior distal wound was 0.2 x 0.3 x 0.5 cm with granulation tissue. Lateral wound was 0.2 x 0.3 x 0.2 cm with granulation and slough and local tenderness. Since the patient is taking furosemide because of his leg edema, I have recommended he drink moderate amounts of fluid with meals and only small amounts of fluid between meals. Dressing ordered:  Pack wounds with silver alginate. Two layer compression wrap with calamine placed form base of toes to upper calf. Leave dressing in place. Use cast cover for showering. 2014 [facility]. Order Farrow wrap to provide compression if wrap must be removed and for use after wraps are discontinued. The patient will follow up in the [address] [place] [address] in one week. FINAL DIAGNOSES:  Nonpressure wounds in left lower leg with fat layer exposed at site of prior abscesses, bilateral leg edema.       W50.947, R60.0     Bartley Buerger, MD

## 2023-01-19 ENCOUNTER — HOSPITAL ENCOUNTER (OUTPATIENT)
Dept: WOUND CARE | Age: 48
Discharge: HOME | End: 2023-01-19
Payer: MEDICAID

## 2023-01-19 VITALS
TEMPERATURE: 98 F | HEART RATE: 84 BPM | SYSTOLIC BLOOD PRESSURE: 108 MMHG | RESPIRATION RATE: 18 BRPM | DIASTOLIC BLOOD PRESSURE: 57 MMHG

## 2023-01-19 DIAGNOSIS — L97.922 NON-PRESSURE CHRONIC ULCER OF LEFT LOWER LEG WITH FAT LAYER EXPOSED (HCC): Primary | ICD-10-CM

## 2023-01-19 DIAGNOSIS — I87.2 VENOUS INSUFFICIENCY: ICD-10-CM

## 2023-01-19 DIAGNOSIS — R60.0 BILATERAL LEG EDEMA: ICD-10-CM

## 2023-01-19 PROCEDURE — 99214 OFFICE O/P EST MOD 30 MIN: CPT | Performed by: SURGERY

## 2023-01-19 PROCEDURE — 99213 OFFICE O/P EST LOW 20 MIN: CPT

## 2023-01-19 RX ORDER — TRIAMCINOLONE ACETONIDE 1 MG/G
OINTMENT TOPICAL ONCE
OUTPATIENT
Start: 2023-01-19 | End: 2023-01-19

## 2023-01-19 RX ORDER — GENTAMICIN SULFATE 1 MG/G
OINTMENT TOPICAL ONCE
OUTPATIENT
Start: 2023-01-19 | End: 2023-01-19

## 2023-01-19 RX ORDER — CLOBETASOL PROPIONATE 0.5 MG/G
OINTMENT TOPICAL ONCE
OUTPATIENT
Start: 2023-01-19 | End: 2023-01-19

## 2023-01-19 RX ORDER — MUPIROCIN 20 MG/G
OINTMENT TOPICAL ONCE
OUTPATIENT
Start: 2023-01-19 | End: 2023-01-19

## 2023-01-19 RX ORDER — LIDOCAINE 40 MG/G
CREAM TOPICAL ONCE
OUTPATIENT
Start: 2023-01-19 | End: 2023-01-19

## 2023-01-19 RX ORDER — SILVER SULFADIAZINE 10 G/1000G
CREAM TOPICAL ONCE
OUTPATIENT
Start: 2023-01-19 | End: 2023-01-19

## 2023-01-19 RX ORDER — LIDOCAINE HYDROCHLORIDE 40 MG/ML
SOLUTION TOPICAL ONCE
OUTPATIENT
Start: 2023-01-19 | End: 2023-01-19

## 2023-01-19 RX ORDER — BACITRACIN ZINC AND POLYMYXIN B SULFATE 500; 1000 [USP'U]/G; [USP'U]/G
OINTMENT TOPICAL ONCE
OUTPATIENT
Start: 2023-01-19 | End: 2023-01-19

## 2023-01-19 RX ORDER — BETAMETHASONE DIPROPIONATE 0.5 MG/G
OINTMENT TOPICAL ONCE
OUTPATIENT
Start: 2023-01-19 | End: 2023-01-19

## 2023-01-19 RX ORDER — LIDOCAINE HYDROCHLORIDE 20 MG/ML
JELLY TOPICAL ONCE
OUTPATIENT
Start: 2023-01-19 | End: 2023-01-19

## 2023-01-19 RX ORDER — BACITRACIN 500 [USP'U]/G
OINTMENT TOPICAL ONCE
OUTPATIENT
Start: 2023-01-19 | End: 2023-01-19

## 2023-01-19 NOTE — PROGRESS NOTES
HISTORY OF PRESENT ILLNESS:  The patient is a 44-year-old man who is referred to the [address] [place] [address] regarding wounds on the left lower leg. The patient was first seen at the [address] [place] [address] on 11/11/2022. Missed all follow up appointments until 1/5/2023. The patient had three abscesses on the left lower leg and was admitted to the hospital from 10/12/2022 to 10/21/2022. He had operative incision and drainage of the abscesses. The wounds are currently being treated by home health with packing changes two times per week. The patient had not seen a physician for a time, but now has a primary care physician. The patient was hospitalized in October 2022 for acute over chronic respiratory insufficiency. He was also then found to have DVT in a posterior tibial vein in the left leg and was started on Eliquis. He has been told to continue the Eliquis for a total of 3 months. The patient does not have history of diabetes, but does have history of prediabetes with hemoglobin A1c of 6.0. He reports he is avoiding carbohydrates now. The patient does not have history of MI or coronary intervention. He does not describe anginal symptoms. He does report some dyspnea with walking a long distance. He is not short of breath at rest or when lying down. The patient's medications include furosemide 40 mg p.o. daily. This does produce a diuresis. The patient had been drinking a good deal of water. He reports decreasing fluid intake. The patient's past medical history includes sleep apnea. He uses a trilogy device at night and says this has greatly improved his sleep. Without the device, he tended to both sleepwalk and fall down while sleepwalking. The patient has never smoked. Wound culture of 1/5/2023 grew multiple gram negative rods and skin manuel, reflecting colonization. No treatment given.      Dressing as of 11/11/2022:  Pack wounds with silver alginate and cover with border foam dressing. Change dressings two times per week and p.r.n. Has Home Health. The patient was reported by his home health nurse on 1/18/2023 to have a large fist sized swelling in his left calf. She had recommended that he go to the ER but he declined. The patient reports that he continues to have pain in the left calf as well as swelling. The patient is still presently taking his Eliquis. He had been diagnosed with a calf vein thrombus in October 2022. Reported weight 310 pounds, height 5 feet 8 inches. PHYSICAL EXAMINATION:    Vital signs temperature 98 degrees, pulse 84, blood pressure 108/57, respirations 18. GENERAL:  The patient is an alert man in no acute distress. WOUND EXAMINATION:  Examination of the right lower extremity revealed 2+ dorsalis pedis pulse. There was trace to 1+ edema in the right lower leg. There were no ulcers in the right lower leg or foot. Examination of the left lower extremity revealed a 2+ dorsalis pedis pulse. There was 2-3+ pitting edema in the left lower leg. There were two wounds on the left lower leg. Anterior distal wound was 0.2 x 0.3 x 0.5 cm with granulation tissue. Lateral wound was 0.2 x 0.3 x 0.2 cm with granulation and slough and local tenderness. There is marked swelling in the proximal third of the posterior lower leg with tenderness relatively localized to that area. There are skin changes of peau d'orange in that area. There is no overt fluctuance. I am concerned about the abrupt swelling and pain in the posterior proximal left lower leg. Major concerns would be localized abscess versus DVT. I have recommended the patient go to the ER at [facility] for evaluation. He has agreed. I calledd  ER and spoke with physician there about patient's situation.         Since the patient is taking furosemide because of his leg edema, I have recommended he drink moderate amounts of fluid with meals and only small amounts of fluid between meals. Dressing ordered: Apply silver alginate over ulcers and cover with border foam dressing. Change 3 times per week. Has Home Health. Order Farrow wrap to provide compression if wrap must be removed and for use after wraps are discontinued. The patient will follow up in the [address] [place] [address] in one week. Ongoing treatment will depend on results of the evaluation in the ER. FINAL DIAGNOSES:  Nonpressure wounds in left lower leg with fat layer exposed at site of prior abscesses, bilateral leg edema.       J73.336, R60.0     Gasper Rodríguez MD

## 2023-01-19 NOTE — DISCHARGE INSTRUCTIONS
Discharge Instructions/Wound Orders  [address], [address] [place] [address] Katie Paniagua, [id number]  Telephone: [phone]    NAME:  Sara Handy OF BIRTH:  1975  MEDICAL RECORD NUMBER:  [id number]  DATE:  01/19/23  Wound Care Orders:  Left lower leg wounds :Cleanse with Soap and water , apply primary dressing Silver Alginate cover with secondary dressing   border foam . Pt./pcg/HH nurse to change (freq) 2 x week and as needed for compromise. F/U in 1 week. Patient to go to the emergency room for evaluation of increased leg swelling and pain. Dietary:  [] Diet as tolerated: [] Diabetic Diet:Low carb and no Sugar   [] No Added Salt:[] Increase Protein:   [] Other:Limit the amount of liquid you are drinking and avoid drinking in between meals   Activity:  [] Activity as tolerated:     Return Appointment:  [] Return Appointment: With DR Lili Baumgarten  in  [address] [place] [address])  [] Ordered tests:   Electronically signed Donal Almazan RN on 1/19/2023 at 10:08 AM   Shobha Chowdhury 281: Should you experience any significant changes in your wound(s) or have questions about your wound care, please contact the [address] [place] [address] at [address] [place] [address] 8:00 am - 4:30. If you need help with your wound outside these hours and cannot wait until we are again available, contact your PCP or go to the hospital emergency room. PLEASE NOTE: IF YOU ARE UNABLE TO OBTAIN WOUND SUPPLIES, CONTINUE TO USE THE SUPPLIES YOU HAVE AVAILABLE UNTIL YOU ARE ABLE TO REACH US. IT IS MOST IMPORTANT TO KEEP THE WOUND COVERED AT ALL TIMES.     Physician Signature:_______________________    Date: ___________ Time:  ____________

## 2023-01-19 NOTE — WOUND CARE
01/19/23 0958   Left Leg Edema Point of Measurement   Leg circumference 61 cm   Ankle circumference 33.3 cm   Compression Therapy Other   Wound Leg lower Anterior;Distal 11/11/22   Date First Assessed/Time First Assessed: 11/11/22 0921   Wound Approximate Age at First Assessment (Weeks): 3 weeks  Primary Wound Type: (c)   Location: Leg lower  Wound Location Orientation: Anterior;Distal  Date of First Observation: 11/11/22   Wound Image    Dressing Status Old drainage noted   Cleansed Soap and water   Wound Length (cm) 0.3 cm   Wound Width (cm) 1 cm   Wound Depth (cm) 0.4 cm   Wound Surface Area (cm^2) 0.3 cm^2   Change in Wound Size % 82.14   Wound Volume (cm^3) 0.12 cm^3   Wound Healing % 93   Wound Assessment Pink/red   Drainage Amount Small   Drainage Description Serous   Wound Odor None   [place]dal[place]-Wound/Incision Assessment Edematous   Edges Defined edges   Wound Thickness Description Full thickness   Wound Leg lower Lateral;Left 11/11/22   Date First Assessed/Time First Assessed: 11/11/22 0922   Wound Approximate Age at First Assessment (Weeks): 3 weeks  Primary Wound Type: (c)   Location: Leg lower  Wound Location Orientation: Lateral;Left  Date of First Observation: 11/11/22   Wound Image    Dressing Status Old drainage noted   Cleansed Soap and water   Wound Length (cm) 0.4 cm   Wound Width (cm) 5 cm   Wound Depth (cm) 0.2 cm   Wound Surface Area (cm^2) 2 cm^2   Change in Wound Size % -9.89   Wound Volume (cm^3) 0.4 cm^3   Wound Healing % 73   Wound Assessment Pink/red   Drainage Amount Small   Drainage Description Serous   Wound Odor None   Magdalena-Wound/Incision Assessment Edematous   Edges Defined edges   Wound Thickness Description Full thickness     Visit Vitals  BP (!) 108/57 (BP 1 Location: Left upper arm, BP Patient Position: At rest)   Pulse 84   Temp 98 °F (36.7 °C)   Resp 18

## 2023-01-20 ENCOUNTER — HOSPITAL ENCOUNTER (EMERGENCY)
Age: 48
Discharge: HOME | End: 2023-01-20
Attending: STUDENT IN AN ORGANIZED HEALTH CARE EDUCATION/TRAINING PROGRAM
Payer: MEDICAID

## 2023-01-20 ENCOUNTER — APPOINTMENT (OUTPATIENT)
Dept: ULTRASOUND IMAGING | Age: 48
End: 2023-01-20
Attending: STUDENT IN AN ORGANIZED HEALTH CARE EDUCATION/TRAINING PROGRAM
Payer: MEDICAID

## 2023-01-20 VITALS
RESPIRATION RATE: 16 BRPM | HEART RATE: 83 BPM | BODY MASS INDEX: 53.78 KG/M2 | WEIGHT: 315 LBS | TEMPERATURE: 97.7 F | DIASTOLIC BLOOD PRESSURE: 68 MMHG | OXYGEN SATURATION: 95 % | HEIGHT: 64 IN | SYSTOLIC BLOOD PRESSURE: 106 MMHG

## 2023-01-20 DIAGNOSIS — L03.116 LEFT LEG CELLULITIS: Primary | ICD-10-CM

## 2023-01-20 LAB
ALBUMIN SERPL-MCNC: 3.4 G/DL (ref 3.5–5)
ALBUMIN/GLOB SERPL: 0.8 (ref 1.1–2.2)
ALP SERPL-CCNC: 61 U/L (ref 45–117)
ALT SERPL-CCNC: 22 U/L (ref 12–78)
ANION GAP SERPL CALC-SCNC: 5 MMOL/L (ref 5–15)
AST SERPL-CCNC: 12 U/L (ref 15–37)
BASOPHILS # BLD: 0 K/UL (ref 0–0.1)
BASOPHILS NFR BLD: 0 % (ref 0–1)
BILIRUB SERPL-MCNC: 0.6 MG/DL (ref 0.2–1)
BUN SERPL-MCNC: 15 MG/DL (ref 6–20)
BUN/CREAT SERPL: 12 (ref 12–20)
CALCIUM SERPL-MCNC: 9 MG/DL (ref 8.5–10.1)
CHLORIDE SERPL-SCNC: 100 MMOL/L (ref 97–108)
CO2 SERPL-SCNC: 33 MMOL/L (ref 21–32)
CREAT SERPL-MCNC: 1.27 MG/DL (ref 0.7–1.3)
DIFFERENTIAL METHOD BLD: ABNORMAL
EOSINOPHIL # BLD: 0.2 K/UL (ref 0–0.4)
EOSINOPHIL NFR BLD: 3 % (ref 0–7)
ERYTHROCYTE [DISTWIDTH] IN BLOOD BY AUTOMATED COUNT: 14.2 % (ref 11.5–14.5)
GLOBULIN SER CALC-MCNC: 4.3 G/DL (ref 2–4)
GLUCOSE SERPL-MCNC: 126 MG/DL (ref 65–100)
HCT VFR BLD AUTO: 40.4 % (ref 36.6–50.3)
HGB BLD-MCNC: 12.4 G/DL (ref 12.1–17)
IMM GRANULOCYTES # BLD AUTO: 0.1 K/UL (ref 0–0.04)
IMM GRANULOCYTES NFR BLD AUTO: 1 % (ref 0–0.5)
LYMPHOCYTES # BLD: 1.4 K/UL (ref 0.8–3.5)
LYMPHOCYTES NFR BLD: 23 % (ref 12–49)
MCH RBC QN AUTO: 28.6 PG (ref 26–34)
MCHC RBC AUTO-ENTMCNC: 30.7 G/DL (ref 30–36.5)
MCV RBC AUTO: 93.3 FL (ref 80–99)
MONOCYTES # BLD: 0.4 K/UL (ref 0–1)
MONOCYTES NFR BLD: 7 % (ref 5–13)
NEUTS SEG # BLD: 4.1 K/UL (ref 1.8–8)
NEUTS SEG NFR BLD: 66 % (ref 32–75)
NRBC # BLD: 0 K/UL (ref 0–0.01)
NRBC BLD-RTO: 0 PER 100 WBC
PLATELET # BLD AUTO: 269 K/UL (ref 150–400)
PMV BLD AUTO: 9.9 FL (ref 8.9–12.9)
POTASSIUM SERPL-SCNC: 3.9 MMOL/L (ref 3.5–5.1)
PROT SERPL-MCNC: 7.7 G/DL (ref 6.4–8.2)
RBC # BLD AUTO: 4.33 M/UL (ref 4.1–5.7)
SODIUM SERPL-SCNC: 138 MMOL/L (ref 136–145)
WBC # BLD AUTO: 6.2 K/UL (ref 4.1–11.1)

## 2023-01-20 PROCEDURE — 99284 EMERGENCY DEPT VISIT MOD MDM: CPT

## 2023-01-20 PROCEDURE — 36415 COLL VENOUS BLD VENIPUNCTURE: CPT

## 2023-01-20 PROCEDURE — 85025 COMPLETE CBC W/AUTO DIFF WBC: CPT

## 2023-01-20 PROCEDURE — 93970 EXTREMITY STUDY: CPT

## 2023-01-20 PROCEDURE — 80053 COMPREHEN METABOLIC PANEL: CPT

## 2023-01-20 RX ORDER — AMOXICILLIN AND CLAVULANATE POTASSIUM 875; 125 MG/1; MG/1
1 TABLET, FILM COATED ORAL 2 TIMES DAILY
Qty: 14 TABLET | Refills: 0 | Status: SHIPPED | OUTPATIENT
Start: 2023-01-20

## 2023-01-20 NOTE — ED PROVIDER NOTES
EMERGENCY DEPARTMENT HISTORY AND PHYSICAL EXAM      Date: 1/20/2023  Patient Name: Stephan Estevez    History of Presenting Illness     Chief Complaint   Patient presents with    Leg Swelling     Left leg swelling for four days. Pt is on blood thinner Eliquis. Pt has a blood clot history. Also has history of infection in the leg and it warm to the touch. Pt does not have diabetes that is known. HPI: History From: Patient, History limited by: none  Stephan Estevez, 56 y.o. male presents to the ED with cc of left leg swelling. This has been progressing over the past week. He reports progressive swelling around his left leg distal to the knee. He has 2 small wounds over the anterior lateral aspect that have been there since October, he has been going to wound care, has not noticed any change in the appearance of the wounds. He denies significant pain in the extremity, more a sensation of tightness due to the swelling. He denies any new trauma, no weakness numbness or tingling, no chest pain or shortness of breath, no fevers. He otherwise feels well. He has been compliant with all of his medications including his Eliquis. He went to wound care this week, and he says was instructed to come to the emergency department to assess for possible blood clot. He says that his right leg actually hurts more than the left, however no significant swelling or wounds of the right leg or trauma there either. There are no other complaints, changes, or physical findings at this time. PCP: Amelie Topete MD    No current facility-administered medications on file prior to encounter. Current Outpatient Medications on File Prior to Encounter   Medication Sig Dispense Refill    carvediloL (COREG) 25 mg tablet Take 1 Tablet by mouth two (2) times daily (with meals).  60 Tablet 1    apixaban (ELIQUIS) 5 mg (74 tabs) starter pack 10 mg bid for one week 5mg bid after one week 1 Dose Pack 2    furosemide (LASIX) 40 mg tablet Take 1 Tablet by mouth daily. 30 Tablet 0    acetaminophen (TYLENOL) 325 mg tablet Take 2 Tablets by mouth every six (6) hours as needed for Fever. (Patient not taking: No sig reported) 20 Tablet 0       Past History     Past Medical History:  Past Medical History:   Diagnosis Date    Sleep apnea 2018       Past Surgical History:  No past surgical history on file. Family History:  Family History   Problem Relation Age of Onset    Diabetes Mother        Social History:  Social History     Tobacco Use    Smoking status: Unknown    Smokeless tobacco: Never   Substance Use Topics    Alcohol use: No    Drug use: Yes     Frequency: 5.0 times per week     Types: Marijuana       Allergies:  No Known Allergies      Physical Exam   Physical Exam  Constitutional:       General: He is not in acute distress. Appearance: He is not toxic-appearing. HENT:      Head: Normocephalic and atraumatic. Eyes:      Extraocular Movements: Extraocular movements intact. Cardiovascular:      Rate and Rhythm: Normal rate and regular rhythm. Pulmonary:      Effort: Pulmonary effort is normal.      Breath sounds: Normal breath sounds. Abdominal:      Palpations: Abdomen is soft. Tenderness: There is no abdominal tenderness. Musculoskeletal:         General: Swelling present. Comments: There is a linear approximately 1 cm wound over the left anterior leg, no surrounding tenderness, focal swelling, purulence or fluctuance. There is another small wound over the lateral aspect also without surrounding focal swelling, tenderness, purulence or fluctuance. The left leg distal to the knee is diffusely swollen, slightly warm as compared to the right. No significant tenderness. Full range of motion of the knee and ankle. Sensation to light touch intact diffusely. Skin:     General: Skin is warm and dry. Neurological:      General: No focal deficit present.       Mental Status: He is alert and oriented to person, place, and time. Psychiatric:         Mood and Affect: Mood normal.       Diagnostic Study Results     Labs -     Recent Results (from the past 24 hour(s))   METABOLIC PANEL, COMPREHENSIVE    Collection Time: 01/20/23  1:37 PM   Result Value Ref Range    Sodium 138 136 - 145 mmol/L    Potassium 3.9 3.5 - 5.1 mmol/L    Chloride 100 97 - 108 mmol/L    CO2 33 (H) 21 - 32 mmol/L    Anion gap 5 5 - 15 mmol/L    Glucose 126 (H) 65 - 100 mg/dL    BUN 15 6 - 20 MG/DL    Creatinine 1.27 0.70 - 1.30 MG/DL    BUN/Creatinine ratio 12 12 - 20      eGFR >60 >60 ml/min/1.73m2    Calcium 9.0 8.5 - 10.1 MG/DL    Bilirubin, total 0.6 0.2 - 1.0 MG/DL    ALT (SGPT) 22 12 - 78 U/L    AST (SGOT) 12 (L) 15 - 37 U/L    Alk. phosphatase 61 45 - 117 U/L    Protein, total 7.7 6.4 - 8.2 g/dL    Albumin 3.4 (L) 3.5 - 5.0 g/dL    Globulin 4.3 (H) 2.0 - 4.0 g/dL    A-G Ratio 0.8 (L) 1.1 - 2.2     CBC WITH AUTOMATED DIFF    Collection Time: 01/20/23  1:37 PM   Result Value Ref Range    WBC 6.2 4.1 - 11.1 K/uL    RBC 4.33 4.10 - 5.70 M/uL    HGB 12.4 12.1 - 17.0 g/dL    HCT 40.4 36.6 - 50.3 %    MCV 93.3 80.0 - 99.0 FL    MCH 28.6 26.0 - 34.0 PG    MCHC 30.7 30.0 - 36.5 g/dL    RDW 14.2 11.5 - 14.5 %    PLATELET 058 153 - 688 K/uL    MPV 9.9 8.9 - 12.9 FL    NRBC 0.0 0  WBC    ABSOLUTE NRBC 0.00 0.00 - 0.01 K/uL    NEUTROPHILS 66 32 - 75 %    LYMPHOCYTES 23 12 - 49 %    MONOCYTES 7 5 - 13 %    EOSINOPHILS 3 0 - 7 %    BASOPHILS 0 0 - 1 %    IMMATURE GRANULOCYTES 1 (H) 0.0 - 0.5 %    ABS. NEUTROPHILS 4.1 1.8 - 8.0 K/UL    ABS. LYMPHOCYTES 1.4 0.8 - 3.5 K/UL    ABS. MONOCYTES 0.4 0.0 - 1.0 K/UL    ABS. EOSINOPHILS 0.2 0.0 - 0.4 K/UL    ABS. BASOPHILS 0.0 0.0 - 0.1 K/UL    ABS. IMM.  GRANS. 0.1 (H) 0.00 - 0.04 K/UL    DF AUTOMATED         Radiologic Studies -   DUPLEX LOWER EXT VENOUS BILAT   Final Result        CT Results  (Last 48 hours)      None          CXR Results  (Last 48 hours)      None              Medical Decision Making I am the first provider for this patient. I reviewed the vital signs, available nursing notes, past medical history, past surgical history, family history and social history. Vital Signs-Reviewed the patient's vital signs. Patient Vitals for the past 24 hrs:   Temp Pulse Resp BP SpO2   01/20/23 1317 97.7 °F (36.5 °C) 83 16 106/68 95 %         Provider Notes (Medical Decision Making):   60-year-old male presenting with left leg swelling. Has a history of chronic wound failure, differential includes cellulitis, DVT, dependent edema, lymphedema. He is afebrile and nontoxic-appearing, no significant tenderness to touch, no purulence or fluctuance, unlikely abscess, deeper space infection, or systemic infection. He denies pain or complaints other than in his legs. ED Course:     Initial assessment performed. The patients presenting problems have been discussed, and they are in agreement with the care plan formulated and outlined with them. I have encouraged them to ask questions as they arise throughout their visit. Medications - No data to display           Labs interpreted by myself, CBC negative for leukocytosis or anemia, basic metabolic panel with unremarkable renal function, no worrisome electrolyte abnormalities, duplex shows no evidence of DVT. Considered admission given his history of cellulitis in the past and comorbidities, however patient states that he would really like to go home and try oral antibiotics, his vitals are unremarkable, he is afebrile nontoxic-appearing as above, I think it is reasonable for him to start with oral antibiotics with very close return precautions. Patient is counseled on supportive care and return precautions. Will return to the ED for any worsening pain, redness, swelling, fever, or any new or worrisome symptoms. Will followup with primary care doctor, wound care within 3 days.     Critical Care Time:         Disposition:  [facility]'s  results have been reviewed with him. He has been counseled regarding his diagnosis, treatment, and plan. He verbally conveys understanding and agreement of the signs, symptoms, diagnosis, treatment and prognosis and additionally agrees to follow up as discussed. He also agrees with the care-plan and conveys that all of his questions have been answered. I have also provided discharge instructions for him that include: educational information regarding their diagnosis and treatment, and list of reasons why they would want to return to the ED prior to their follow-up appointment, should his condition change. PLAN:  1. Current Discharge Medication List        START taking these medications    Details   amoxicillin-clavulanate (Augmentin) 875-125 mg per tablet Take 1 Tablet by mouth two (2) times a day. Qty: 14 Tablet, Refills: 0  Start date: 1/20/2023           2.    Follow-up Information       Follow up With Specialties Details Why Contact Info    Emanuel Lao MD Emergency Medicine Schedule an appointment as soon as possible for a visit in 3 days  [facility] [place] [address]  Schedule an appointment as soon as possible for a visit in 3 days      [facility] EMERGENCY DEPT Emergency Medicine  As needed, If symptoms worsen [address] [place] [address]  [phone]          Return to ED if worse     Diagnosis     Clinical Impression: Acute left leg cellulitis

## 2023-01-20 NOTE — ED NOTES
Josefa MEJIA reviewed discharge instructions with the patient. The patient verbalized understanding. All questions and concerns were addressed. The patient declined a wheelchair and is discharged ambulatory in the care of family members with instructions and prescriptions in hand. Pt is alert and oriented x 4. Respirations are clear and unlabored.

## 2023-01-26 ENCOUNTER — HOSPITAL ENCOUNTER (OUTPATIENT)
Dept: WOUND CARE | Age: 48
Discharge: HOME | End: 2023-01-26
Payer: MEDICAID

## 2023-01-26 VITALS
HEART RATE: 83 BPM | DIASTOLIC BLOOD PRESSURE: 74 MMHG | SYSTOLIC BLOOD PRESSURE: 131 MMHG | RESPIRATION RATE: 18 BRPM | TEMPERATURE: 98.8 F

## 2023-01-26 DIAGNOSIS — L97.922 NON-PRESSURE CHRONIC ULCER OF LEFT LOWER LEG WITH FAT LAYER EXPOSED (HCC): Primary | ICD-10-CM

## 2023-01-26 DIAGNOSIS — R60.0 BILATERAL LEG EDEMA: ICD-10-CM

## 2023-01-26 DIAGNOSIS — I87.2 VENOUS INSUFFICIENCY: ICD-10-CM

## 2023-01-26 PROCEDURE — 99213 OFFICE O/P EST LOW 20 MIN: CPT | Performed by: SURGERY

## 2023-01-26 PROCEDURE — 29581 APPL MULTLAYER CMPRN SYS LEG: CPT

## 2023-01-26 RX ORDER — BACITRACIN 500 [USP'U]/G
OINTMENT TOPICAL ONCE
OUTPATIENT
Start: 2023-01-26 | End: 2023-01-26

## 2023-01-26 RX ORDER — LIDOCAINE HYDROCHLORIDE 20 MG/ML
JELLY TOPICAL ONCE
OUTPATIENT
Start: 2023-01-26 | End: 2023-01-26

## 2023-01-26 RX ORDER — BETAMETHASONE DIPROPIONATE 0.5 MG/G
OINTMENT TOPICAL ONCE
OUTPATIENT
Start: 2023-01-26 | End: 2023-01-26

## 2023-01-26 RX ORDER — CLOBETASOL PROPIONATE 0.5 MG/G
OINTMENT TOPICAL ONCE
OUTPATIENT
Start: 2023-01-26 | End: 2023-01-26

## 2023-01-26 RX ORDER — MUPIROCIN 20 MG/G
OINTMENT TOPICAL ONCE
OUTPATIENT
Start: 2023-01-26 | End: 2023-01-26

## 2023-01-26 RX ORDER — GENTAMICIN SULFATE 1 MG/G
OINTMENT TOPICAL ONCE
OUTPATIENT
Start: 2023-01-26 | End: 2023-01-26

## 2023-01-26 RX ORDER — LIDOCAINE 40 MG/G
CREAM TOPICAL ONCE
OUTPATIENT
Start: 2023-01-26 | End: 2023-01-26

## 2023-01-26 RX ORDER — LIDOCAINE HYDROCHLORIDE 40 MG/ML
SOLUTION TOPICAL ONCE
OUTPATIENT
Start: 2023-01-26 | End: 2023-01-26

## 2023-01-26 RX ORDER — BACITRACIN ZINC AND POLYMYXIN B SULFATE 500; 1000 [USP'U]/G; [USP'U]/G
OINTMENT TOPICAL ONCE
OUTPATIENT
Start: 2023-01-26 | End: 2023-01-26

## 2023-01-26 RX ORDER — TRIAMCINOLONE ACETONIDE 1 MG/G
OINTMENT TOPICAL ONCE
OUTPATIENT
Start: 2023-01-26 | End: 2023-01-26

## 2023-01-26 RX ORDER — SILVER SULFADIAZINE 10 G/1000G
CREAM TOPICAL ONCE
OUTPATIENT
Start: 2023-01-26 | End: 2023-01-26

## 2023-01-26 NOTE — WOUND CARE
01/26/23 1048   Left Leg Edema Point of Measurement   Leg circumference 54.8 cm   Ankle circumference 31.5 cm   Compression Therapy Other   LLE Peripheral Vascular    Capillary Refill Less than/equal to 3 seconds   Color Appropriate for race   Temperature Warm   Sensation Present   Pedal Pulse Present   Wound Leg lower Anterior;Distal 11/11/22   Date First Assessed/Time First Assessed: 11/11/22 0921   Wound Approximate Age at First Assessment (Weeks): 3 weeks  Primary Wound Type: (c)   Location: Leg lower  Wound Location Orientation: Anterior;Distal  Date of First Observation: 11/11/22   Wound Image    Wound Etiology Other (Comment)   Dressing Status Old drainage noted   Cleansed Cleansed with saline   Wound Length (cm) 0.2 cm   Wound Width (cm) 0.5 cm   Wound Depth (cm) 0.2 cm   Wound Surface Area (cm^2) 0.1 cm^2   Change in Wound Size % 94.05   Wound Volume (cm^3) 0.02 cm^3   Wound Healing % 99   Wound Assessment Pink/red   Drainage Amount Small   Drainage Description Serous   Wound Odor None   Magdalena-Wound/Incision Assessment Edematous   Edges Defined edges   Wound Thickness Description Full thickness   Wound Leg lower Lateral;Left 11/11/22   Date First Assessed/Time First Assessed: 11/11/22 0922   Wound Approximate Age at First Assessment (Weeks): 3 weeks  Primary Wound Type: (c)   Location: Leg lower  Wound Location Orientation: Lateral;Left  Date of First Observation: 11/11/22   Wound Image    Wound Etiology Other (Comment)   Dressing Status Old drainage noted   Cleansed Soap and water   Wound Length (cm) 0.9 cm   Wound Width (cm) 0.3 cm   Wound Depth (cm) 0.1 cm   Wound Surface Area (cm^2) 0.27 cm^2   Change in Wound Size % 85.16   Wound Volume (cm^3) 0.027 cm^3   Wound Healing % 98   Wound Assessment Pink/red   Drainage Amount Small   Drainage Description Serous   Wound Odor None   [place]dalena-Wound/Incision Assessment Edematous   Edges Defined edges   Wound Thickness Description Full thickness     Visit Vitals  /74 (BP 1 Location: Left upper arm, BP Patient Position: At rest)   Pulse 83   Temp 98.8 °F (37.1 °C)   Resp 18

## 2023-01-26 NOTE — DISCHARGE INSTRUCTIONS
Discharge Instructions/Wound Orders  [address], [address] [place] [address] Katie Paniagua, [id number]  Telephone: [phone]    NAME:  Anaya Andrews OF BIRTH:  1975  MEDICAL RECORD NUMBER:  [id number]  DATE:  01/26/23  Wound Care Orders:  Left lower leg wounds :Cleanse with Soap and water , apply primary dressing Silver Alginate cover with secondary dressing   abd pad . Apply 2 layers with Calamine  Pt./pcg/HH nurse to change (freq) 2 x week and as needed for compromise. F/U in 1 week. Dietary:  [] Diet as tolerated: [] Diabetic Diet:Low carb and no Sugar   [] No Added Salt:[] Increase Protein:   [] Other:Limit the amount of liquid you are drinking and avoid drinking in between meals   Activity:  [] Activity as tolerated:     Return Appointment:  [] Return Appointment: With DR Cammy Armstrong  in  1 [facility])  [] Ordered tests:   Electronically signed Thad Fried RN on 1/26/2023 at [address]: Should you experience any significant changes in your wound(s) or have questions about your wound care, please contact the [address] [place] [address] at [address] [place] [address] 8:00 am - 4:30. If you need help with your wound outside these hours and cannot wait until we are again available, contact your PCP or go to the hospital emergency room. PLEASE NOTE: IF YOU ARE UNABLE TO OBTAIN WOUND SUPPLIES, CONTINUE TO USE THE SUPPLIES YOU HAVE AVAILABLE UNTIL YOU ARE ABLE TO REACH US. IT IS MOST IMPORTANT TO KEEP THE WOUND COVERED AT ALL TIMES.     Physician Signature:_______________________    Date: ___________ Time:  ____________

## 2023-01-26 NOTE — WOUND CARE
01/26/23 1102   Wound Leg lower Anterior;Distal 11/11/22   Date First Assessed/Time First Assessed: 11/11/22 0921   Wound Approximate Age at First Assessment (Weeks): 3 weeks  Primary Wound Type: (c)   Location: Leg lower  Wound Location Orientation: Anterior;Distal  Date of First Observation: 11/11/22   Dressing Status New dressing applied   Dressing/Treatment Alginate with Ag;ABD pad  (2 layer with calamine)   Wound Leg lower Lateral;Left 11/11/22   Date First Assessed/Time First Assessed: 11/11/22 0922   Wound Approximate Age at First Assessment (Weeks): 3 weeks  Primary Wound Type: (c)   Location: Leg lower  Wound Location Orientation: Lateral;Left  Date of First Observation: 11/11/22   Dressing Status New dressing applied   Dressing/Treatment Alginate with Ag;ABD pad  (2 layer with calamine)   Multilayer Compression Wrap   (Not Unna) Below the Knee    NAME:  Mace Query  YOB: 1975  MEDICAL RECORD NUMBER:  [id number]  DATE:  1/26/2023    Removed old Multilayer wrap if indicated and wash leg with mild soap/water. Applied moisturizing agent to dry skin as needed. Applied primary and secondary dressing as ordered. Applied multilayered dressing below the knee to left lower leg. Instructed patient/caregiver not to remove dressing and to keep it clean and dry. Instructed patient/caregiver on complications to report to provider, such as pain, numbness in toes, heavy drainage, and slippage of dressing. Instructed patient on purpose of compression dressing and on activity and exercise recommendations.     Response to treatment: Well tolerated by patient       Electronically signed by Adilson Walsh RN on 1/26/2023 at 11:03 AM

## 2023-01-26 NOTE — PROGRESS NOTES
HISTORY OF PRESENT ILLNESS:  The patient is a 46-year-old man who is referred to the [address] [place] [address] regarding wounds on the left lower leg. The patient was first seen at the [address] [place] [address] on 11/11/2022. Missed all follow up appointments until 1/5/2023. The patient had three abscesses on the left lower leg and was admitted to the hospital from 10/12/2022 to 10/21/2022. He had operative incision and drainage of the abscesses. The wounds are currently being treated by home health with packing changes two times per week. The patient had not seen a physician for a time, but now has a primary care physician. The patient was hospitalized in October 2022 for acute over chronic respiratory insufficiency. He was also then found to have DVT in a posterior tibial vein in the left leg and was started on Eliquis. He has been told to continue the Eliquis for a total of 3 months. The patient does not have history of diabetes, but does have history of prediabetes with hemoglobin A1c of 6.0. He reports he is avoiding carbohydrates now. The patient does not have history of MI or coronary intervention. He does not describe anginal symptoms. He does report some dyspnea with walking a long distance. He is not short of breath at rest or when lying down. The patient's medications include furosemide 40 mg p.o. daily. This does produce a diuresis. The patient had been drinking a good deal of water. He reports decreasing fluid intake. The patient's past medical history includes sleep apnea. He uses a trilogy device at night and says this has greatly improved his sleep. Without the device, he tended to both sleepwalk and fall down while sleepwalking. The patient has never smoked. Wound culture of 1/5/2023 grew multiple gram negative rods and skin manuel, reflecting colonization. No treatment given.      Dressing as of 11/11/2022:  Pack wounds with silver alginate and cover with border foam dressing. Change dressings two times per week and p.r.n. Has Home Health. The patient was reported by his home health nurse on 1/18/2023 to have a large fist sized swelling in his left calf. She had recommended that he go to the ER but he declined. The patient reports that he continues to have pain in the left calf as well as swelling. The patient is still presently taking his Eliquis. He had been diagnosed with a calf vein thrombus in October 2022. The patient went to the ER on 1/20/2023. Ultrasound was negative for DVT. The patient was felt to have cellulitis and given 1 week of Augmentin. He reports that the upper calf pain began to decrease about 3 days after starting the Augmentin. Pain presently is mild. Reported weight 310 pounds, height 5 feet 8 inches. PHYSICAL EXAMINATION:     Vital signs temperature 98 degrees, pulse 84, blood pressure 108/57, respirations 18. GENERAL:  The patient is an alert man in no acute distress. WOUND EXAMINATION:  Examination of the right lower extremity revealed 2+ dorsalis pedis pulse. There was trace to 1+ edema in the right lower leg. There were no ulcers in the right lower leg or foot. Examination of the left lower extremity revealed a 2+ dorsalis pedis pulse. There was 2-+ pitting edema in the left lower leg. There were two wounds on the left lower leg. Anterior distal wound was small with granulation tissue. Lateral wound was small with granulation and slough and local tenderness. There is moderate swelling in the proximal third of the posterior lower leg with minimal tenderness. There is no overt fluctuance. Since the patient is taking furosemide because of his leg edema, I have recommended he drink moderate amounts of fluid with meals and only small amounts of fluid between meals. Dressing ordered: Apply silver alginate over ulcers and cover with border foam dressing.   Change 2 times per week.     Has Home Health. Order Farrow wrap to provide compression if wrap must be removed and for use after wraps are discontinued. Follow up in 1 week. FINAL DIAGNOSES:  Nonpressure wounds in left lower leg with fat layer exposed at site of prior abscesses, bilateral leg edema.       M62.417, R60.0     Jens Howe MD

## 2023-02-02 ENCOUNTER — HOSPITAL ENCOUNTER (OUTPATIENT)
Dept: WOUND CARE | Age: 48
Discharge: HOME | End: 2023-02-02
Payer: MEDICAID

## 2023-02-02 VITALS
DIASTOLIC BLOOD PRESSURE: 61 MMHG | RESPIRATION RATE: 18 BRPM | HEART RATE: 73 BPM | SYSTOLIC BLOOD PRESSURE: 110 MMHG | TEMPERATURE: 97.8 F

## 2023-02-02 DIAGNOSIS — L97.922 NON-PRESSURE CHRONIC ULCER OF LEFT LOWER LEG WITH FAT LAYER EXPOSED (HCC): Primary | ICD-10-CM

## 2023-02-02 DIAGNOSIS — R60.0 BILATERAL LEG EDEMA: ICD-10-CM

## 2023-02-02 PROCEDURE — 99213 OFFICE O/P EST LOW 20 MIN: CPT | Performed by: SURGERY

## 2023-02-02 PROCEDURE — 29581 APPL MULTLAYER CMPRN SYS LEG: CPT

## 2023-02-02 RX ORDER — BACITRACIN 500 [USP'U]/G
OINTMENT TOPICAL ONCE
OUTPATIENT
Start: 2023-02-02 | End: 2023-02-02

## 2023-02-02 RX ORDER — BETAMETHASONE DIPROPIONATE 0.5 MG/G
OINTMENT TOPICAL ONCE
OUTPATIENT
Start: 2023-02-02 | End: 2023-02-02

## 2023-02-02 RX ORDER — LIDOCAINE HYDROCHLORIDE 20 MG/ML
JELLY TOPICAL ONCE
OUTPATIENT
Start: 2023-02-02 | End: 2023-02-02

## 2023-02-02 RX ORDER — GENTAMICIN SULFATE 1 MG/G
OINTMENT TOPICAL ONCE
OUTPATIENT
Start: 2023-02-02 | End: 2023-02-02

## 2023-02-02 RX ORDER — BACITRACIN ZINC AND POLYMYXIN B SULFATE 500; 1000 [USP'U]/G; [USP'U]/G
OINTMENT TOPICAL ONCE
OUTPATIENT
Start: 2023-02-02 | End: 2023-02-02

## 2023-02-02 RX ORDER — TRIAMCINOLONE ACETONIDE 1 MG/G
OINTMENT TOPICAL ONCE
OUTPATIENT
Start: 2023-02-02 | End: 2023-02-02

## 2023-02-02 RX ORDER — LIDOCAINE HYDROCHLORIDE 40 MG/ML
SOLUTION TOPICAL ONCE
OUTPATIENT
Start: 2023-02-02 | End: 2023-02-02

## 2023-02-02 RX ORDER — MUPIROCIN 20 MG/G
OINTMENT TOPICAL ONCE
OUTPATIENT
Start: 2023-02-02 | End: 2023-02-02

## 2023-02-02 RX ORDER — SILVER SULFADIAZINE 10 G/1000G
CREAM TOPICAL ONCE
OUTPATIENT
Start: 2023-02-02 | End: 2023-02-02

## 2023-02-02 RX ORDER — CLOBETASOL PROPIONATE 0.5 MG/G
OINTMENT TOPICAL ONCE
OUTPATIENT
Start: 2023-02-02 | End: 2023-02-02

## 2023-02-02 RX ORDER — LIDOCAINE 40 MG/G
CREAM TOPICAL ONCE
OUTPATIENT
Start: 2023-02-02 | End: 2023-02-02

## 2023-02-02 NOTE — PROGRESS NOTES
HISTORY OF PRESENT ILLNESS:  The patient is a 52-year-old man who is referred to the [address] [place] [address] regarding wounds on the left lower leg. The patient was first seen at the [address] [place] [address] on 11/11/2022. Missed all follow up appointments until 1/5/2023. The patient had three abscesses on the left lower leg and was admitted to the hospital from 10/12/2022 to 10/21/2022. He had operative incision and drainage of the abscesses. The wounds are currently being treated by home health with packing changes two times per week. The patient had not seen a physician for a time, but now has a primary care physician. The patient was hospitalized in October 2022 for acute over chronic respiratory insufficiency. He was also then found to have DVT in a posterior tibial vein in the left leg and was started on Eliquis. He has been told to continue the Eliquis for a total of 3 months. The patient does not have history of diabetes, but does have history of prediabetes with hemoglobin A1c of 6.0. He reports he is avoiding carbohydrates now. The patient does not have history of MI or coronary intervention. He does not describe anginal symptoms. He does report some dyspnea with walking a long distance. He is not short of breath at rest or when lying down. The patient's medications include furosemide 40 mg p.o. daily. This does produce a diuresis. The patient had been drinking a good deal of water. He reports decreasing fluid intake. The patient's past medical history includes sleep apnea. He uses a trilogy device at night and says this has greatly improved his sleep. Without the device, he tended to both sleepwalk and fall down while sleepwalking. The patient has never smoked. Wound culture of 1/5/2023 grew multiple gram negative rods and skin manuel, reflecting colonization. No treatment given.      Dressing as of 11/11/2022:  Pack wounds with silver alginate and cover with border foam dressing. Change dressings two times per week and p.r.n. Has [facility]. The patient was reported by his home health nurse on 1/18/2023 to have a large fist sized swelling in his left calf. She had recommended that he go to the ER but he declined. The patient reports that he continues to have pain in the left calf as well as swelling. The patient is still presently taking his Eliquis. He had been diagnosed with a calf vein thrombus in October 2022. The patient went to the ER on 1/20/2023. Ultrasound was negative for DVT. The patient was felt to have cellulitis and given 1 week of Augmentin. He reports that the upper calf pain began to decrease about 3 days after starting the Augmentin. Pain presently is mild. Dressing as of 1/26/2023:  Apply silver alginate over ulcers , ABD, two layer wrap with calamine. Change 2 times per week by [facility]. Patient removed dressing after 2 days because it felt tight and applied new Farrow wrap. Reported weight 310 pounds, height 5 feet 8 inches. PHYSICAL EXAMINATION:     Vital signs temperature 98 degrees, pulse 84, blood pressure 108/57, respirations 18. GENERAL:  The patient is an alert man in no acute distress. WOUND EXAMINATION:  Examination of the right lower extremity revealed 2+ dorsalis pedis pulse. There was trace to 1+ edema in the right lower leg. There were no ulcers in the right lower leg or foot. Examination of the left lower extremity revealed a 2+ dorsalis pedis pulse. There was 1+ pitting edema in the left lower leg. There were two wounds on the left lower leg now of minimal size. Since the patient is taking furosemide because of his leg edema, I have recommended he drink moderate amounts of fluid with meals and only small amounts of fluid between meals. Dressing ordered: Apply silver alginate over ulcers , ABD, two layer wrap with calamine.       Use farrow wrap if wrap must be removed. [facility]. Consider venous evaluation. Follow up in 1 week. FINAL DIAGNOSES:  Nonpressure wounds in left lower leg with fat layer exposed at site of prior abscesses, bilateral leg edema.       G14.604, R60.0     Collin Butler MD

## 2023-02-02 NOTE — DISCHARGE INSTRUCTIONS
Discharge Instructions [address], [address] [place] [address] Katie Paniagua, [id number]  Telephone: [phone]    NAME:  Noreen Piña  YOB: 1975  MEDICAL RECORD NUMBER:  [id number]  DATE:  2/2/2023  WOUND CARE ORDERS:  Left leg wound :Cleanse with saline , apply primary dressing Silver Alginate cover with secondary dressing   abd pad . Apply 2 layers with Calamine  Pt./pcg/HH nurse to change (freq) every other day and as needed for compromise. F/U in 1 week. No longer needs home health. .    TREATMENT ORDERS:    Elevate leg(s) above the level of the heart when sitting. Avoid prolonged standing in one place. Do no get dressing/wrap wet. Follow Diet as prescribed:   [] Diet as tolerated: [] Calorie Diabetic Diet: Low carb and no Sugar [] No Added Salt:  [] Increase Protein: [] Limit the amount of liquid you are drinking and avoid drinking in between meals           Return Appointment:  [x] Return Appointment: With DR Pastor Ohara  in  1 [facility])  [] Nurse Visit : *** days  [] Ordered tests:    Electronically signed Jazmine Nicholson RN on 2/2/2023 at [address] Information: Should you experience any significant changes in your wound(s) or have questions about your wound care, please contact the [address] [place] [address] at [address] [place] [address] 8:00 am - 4:30. If you need help with your wound outside these hours and cannot wait until we are again available, contact your PCP or go to the hospital emergency room. PLEASE NOTE: IF YOU ARE UNABLE TO OBTAIN WOUND SUPPLIES, CONTINUE TO USE THE SUPPLIES YOU HAVE AVAILABLE UNTIL YOU ARE ABLE TO REACH US. IT IS MOST IMPORTANT TO KEEP THE WOUND COVERED AT ALL TIMES.      Physician Signature:_______________________    Date: ___________ Time:  ____________

## 2023-02-02 NOTE — WOUND CARE
02/02/23 1101   Wound Leg lower Anterior;Distal 11/11/22   Date First Assessed/Time First Assessed: 11/11/22 0921   Wound Approximate Age at First Assessment (Weeks): 3 weeks  Primary Wound Type: (c)   Location: Leg lower  Wound Location Orientation: Anterior;Distal  Date of First Observation: 11/11/22   Wound Image    Dressing Status Old drainage noted   Cleansed Cleansed with saline   Wound Length (cm) 0.2 cm   Wound Width (cm) 0.3 cm   Wound Depth (cm) 0.1 cm   Wound Surface Area (cm^2) 0.06 cm^2   Change in Wound Size % 96.43   Wound Volume (cm^3) 0.006 cm^3   Wound Healing % 100   Wound Assessment Pink/red   Drainage Amount Scant   Drainage Description Serous   Wound Odor None   [place]dal[place]-Wound/Incision Assessment Edematous   Edges Defined edges   Wound Thickness Description Full thickness   Wound Leg lower Lateral;Left 11/11/22   Date First Assessed/Time First Assessed: 11/11/22 0922   Wound Approximate Age at First Assessment (Weeks): 3 weeks  Primary Wound Type: (c)   Location: Leg lower  Wound Location Orientation: Lateral;Left  Date of First Observation: 11/11/22   Wound Image    Dressing Status Old drainage noted   Cleansed Cleansed with saline   Wound Length (cm) 0.1 cm   Wound Width (cm) 0.1 cm   Wound Depth (cm) 0.1 cm   Wound Surface Area (cm^2) 0.01 cm^2   Change in Wound Size % 99.45   Wound Volume (cm^3) 0.001 cm^3   Wound Healing % 100   Wound Thickness Description   (appears to be healed)   Visit Vitals  Temp 97.8 °F (36.6 °C)   Resp 18   HR 73 /61

## 2023-02-09 ENCOUNTER — HOSPITAL ENCOUNTER (OUTPATIENT)
Dept: WOUND CARE | Age: 48
Discharge: HOME | End: 2023-02-09
Payer: MEDICAID

## 2023-02-09 VITALS
DIASTOLIC BLOOD PRESSURE: 75 MMHG | HEART RATE: 88 BPM | TEMPERATURE: 98.1 F | SYSTOLIC BLOOD PRESSURE: 129 MMHG | RESPIRATION RATE: 18 BRPM

## 2023-02-09 DIAGNOSIS — L97.922 NON-PRESSURE CHRONIC ULCER OF LEFT LOWER LEG WITH FAT LAYER EXPOSED (HCC): Primary | ICD-10-CM

## 2023-02-09 DIAGNOSIS — R60.0 BILATERAL LEG EDEMA: ICD-10-CM

## 2023-02-09 PROCEDURE — 99213 OFFICE O/P EST LOW 20 MIN: CPT | Performed by: SURGERY

## 2023-02-09 RX ORDER — CLOBETASOL PROPIONATE 0.5 MG/G
OINTMENT TOPICAL ONCE
OUTPATIENT
Start: 2023-02-09 | End: 2023-02-09

## 2023-02-09 RX ORDER — LIDOCAINE HYDROCHLORIDE 20 MG/ML
JELLY TOPICAL ONCE
OUTPATIENT
Start: 2023-02-09 | End: 2023-02-09

## 2023-02-09 RX ORDER — BETAMETHASONE DIPROPIONATE 0.5 MG/G
OINTMENT TOPICAL ONCE
OUTPATIENT
Start: 2023-02-09 | End: 2023-02-09

## 2023-02-09 RX ORDER — BACITRACIN 500 [USP'U]/G
OINTMENT TOPICAL ONCE
OUTPATIENT
Start: 2023-02-09 | End: 2023-02-09

## 2023-02-09 RX ORDER — GENTAMICIN SULFATE 1 MG/G
OINTMENT TOPICAL ONCE
OUTPATIENT
Start: 2023-02-09 | End: 2023-02-09

## 2023-02-09 RX ORDER — TRIAMCINOLONE ACETONIDE 1 MG/G
OINTMENT TOPICAL ONCE
OUTPATIENT
Start: 2023-02-09 | End: 2023-02-09

## 2023-02-09 RX ORDER — LIDOCAINE HYDROCHLORIDE 40 MG/ML
SOLUTION TOPICAL ONCE
OUTPATIENT
Start: 2023-02-09 | End: 2023-02-09

## 2023-02-09 RX ORDER — BACITRACIN ZINC AND POLYMYXIN B SULFATE 500; 1000 [USP'U]/G; [USP'U]/G
OINTMENT TOPICAL ONCE
OUTPATIENT
Start: 2023-02-09 | End: 2023-02-09

## 2023-02-09 RX ORDER — SILVER SULFADIAZINE 10 G/1000G
CREAM TOPICAL ONCE
OUTPATIENT
Start: 2023-02-09 | End: 2023-02-09

## 2023-02-09 RX ORDER — MUPIROCIN 20 MG/G
OINTMENT TOPICAL ONCE
OUTPATIENT
Start: 2023-02-09 | End: 2023-02-09

## 2023-02-09 RX ORDER — LIDOCAINE 40 MG/G
CREAM TOPICAL ONCE
OUTPATIENT
Start: 2023-02-09 | End: 2023-02-09

## 2023-02-09 NOTE — WOUND CARE
02/09/23 1058   LLE Peripheral Vascular    Capillary Refill Less than/equal to 3 seconds   Color Appropriate for race   Temperature Warm   Sensation Decreased   Pedal Pulse Palpable   Wound Leg lower Anterior;Distal 11/11/22   Date First Assessed/Time First Assessed: 11/11/22 0921   Wound Approximate Age at First Assessment (Weeks): 3 weeks  Primary Wound Type: (c)   Location: Leg lower  Wound Location Orientation: Anterior;Distal  Date of First Observation: 11/11/22   Wound Image    Wound Etiology Venous   Dressing Status Old drainage noted   Cleansed Cleansed with saline   Dressing/Treatment   (Silver alginate, foam border)   Wound Length (cm) 0.1 cm   Wound Width (cm) 0.1 cm   Wound Depth (cm) 0.1 cm   Wound Surface Area (cm^2) 0.01 cm^2   Change in Wound Size % 99.4   Wound Volume (cm^3) 0.001 cm^3   Wound Healing % 100   Wound Assessment Epithelialization   Drainage Amount None   Wound Odor None   [place]dal[place]-Wound/Incision Assessment Intact   Edges Attached edges   Wound Thickness Description Full thickness   Visit Vitals  /75 (BP 1 Location: Left upper arm, BP Patient Position: At rest;Reclining)   Pulse 88   Temp 98.1 °F (36.7 °C)   Resp 18

## 2023-02-09 NOTE — WOUND CARE
02/09/23 1109   Wound Leg lower Anterior;Distal 11/11/22   Date First Assessed/Time First Assessed: 11/11/22 0921   Wound Approximate Age at First Assessment (Weeks): 3 weeks  Primary Wound Type: (c)   Location: Leg lower  Wound Location Orientation: Anterior;Distal  Date of First Observation: 11/11/22   Dressing/Treatment   (Silver alginate, ABD, 2 layer with calamine)   Multilayer Compression Wrap   (Not Unna) Below the Knee    NAME:  Alina Hunter  YOB: 1975  MEDICAL RECORD NUMBER:  [id number]  DATE:  2/9/2023    Removed old Multilayer wrap if indicated and wash leg with mild soap/water. Applied moisturizing agent to dry skin as needed. Applied primary and secondary dressing as ordered. Applied multilayered dressing below the knee to left lower leg. Instructed patient/caregiver not to remove dressing and to keep it clean and dry. Instructed patient/caregiver on complications to report to provider, such as pain, numbness in toes, heavy drainage, and slippage of dressing.     Response to treatment: Well tolerated by patient       Electronically signed by Deyvi Varela RN on 2/9/2023 at 11:11 AM

## 2023-02-09 NOTE — DISCHARGE INSTRUCTIONS
Discharge Instructions [address], [address] [place] [address] Katie Paniagua, [id number]  Telephone: [phone]    NAME:  Fabby Duval  YOB: 1975  MEDICAL RECORD NUMBER:  [id number]  DATE:  2/9/2023  WOUND CARE ORDERS:  Left lower leg wound :Cleanse with saline , apply primary dressing Silver Alginate cover with secondary dressing   abd pad . Apply 2 layers with Calamine  Pt./pcg/HH nurse to change (freq) once a week in clinic and as needed for compromise. F/U in 1 week. TREATMENT ORDERS:    Elevate leg(s) above the level of the heart when sitting. Avoid prolonged standing in one place. Do no get dressing/wrap wet. Follow Diet as prescribed:   [x] Diet as tolerated: [] Calorie Diabetic Diet: Low carb and no Sugar [x] No Added Salt:  [x] Increase Protein: [] Limit the amount of liquid you are drinking and avoid drinking in between meals           Return Appointment:  [x] Return Appointment: With DR Renaldo Arguello  in  1 [facility])  [] Nurse Visit :  days  [] Ordered tests:    Electronically signed Farhad Modi RN on 2/9/2023 at 11:03 AM     [address] Information: Should you experience any significant changes in your wound(s) or have questions about your wound care, please contact the [address] [place] [address] at [address] [place] [address] Street 8:00 am - 4:30. If you need help with your wound outside these hours and cannot wait until we are again available, contact your PCP or go to the hospital emergency room. PLEASE NOTE: IF YOU ARE UNABLE TO OBTAIN WOUND SUPPLIES, CONTINUE TO USE THE SUPPLIES YOU HAVE AVAILABLE UNTIL YOU ARE ABLE TO REACH US. IT IS MOST IMPORTANT TO KEEP THE WOUND COVERED AT ALL TIMES.      Physician Signature:_______________________    Date: ___________ Time:  ____________

## 2023-02-09 NOTE — PROGRESS NOTES
HISTORY OF PRESENT ILLNESS:  The patient is a 56-year-old man who is referred to the [address] [place] [address] regarding wounds on the left lower leg. The patient was first seen at the [address] [place] [address] on 11/11/2022. Missed all follow up appointments until 1/5/2023. The patient had three abscesses on the left lower leg and was admitted to the hospital from 10/12/2022 to 10/21/2022. He had operative incision and drainage of the abscesses. The wounds are currently being treated by home health with packing changes two times per week. The patient had not seen a physician for a time, but now has a primary care physician. The patient was hospitalized in October 2022 for acute over chronic respiratory insufficiency. He was also then found to have DVT in a posterior tibial vein in the left leg and was started on Eliquis. He has been told to continue the Eliquis for a total of 3 months. The patient does not have history of diabetes, but does have history of prediabetes with hemoglobin A1c of 6.0. He reports he is avoiding carbohydrates now. The patient does not have history of MI or coronary intervention. He does not describe anginal symptoms. He does report some dyspnea with walking a long distance. He is not short of breath at rest or when lying down. The patient's medications include furosemide 40 mg p.o. daily. This does produce a diuresis. The patient had been drinking a good deal of water. He reports decreasing fluid intake, but as of 2/9/2023 drinking 3-4 water bottles per day. The patient's past medical history includes sleep apnea. He uses a trilogy device at night and says this has greatly improved his sleep. Without the device, he tended to both sleepwalk and fall down while sleepwalking. The patient has never smoked. Wound culture of 1/5/2023 grew multiple gram negative rods and skin manuel, reflecting colonization. No treatment given.      Dressing as of 11/11/2022:  Pack wounds with silver alginate and cover with border foam dressing. Change dressings two times per week and p.r.n. Has [facility]. The patient was reported by his home health nurse on 1/18/2023 to have a large fist sized swelling in his left calf. She had recommended that he go to the ER but he declined. The patient reports that he continues to have pain in the left calf as well as swelling. The patient is still presently taking his Eliquis. He had been diagnosed with a calf vein thrombus in October 2022. The patient went to the ER on 1/20/2023. Ultrasound was negative for DVT. The patient was felt to have cellulitis and given 1 week of Augmentin. He reports that the upper calf pain began to decrease about 3 days after starting the Augmentin. Pain presently is mild. Dressing as of 1/26/2023:  Apply silver alginate over ulcers , ABD, two layer wrap with calamine. Change 2 times per week by [facility]. Patient removed dressing 1 day before 2/9/2023 visit and applied Farrow wrap. Reported weight 310 pounds, height 5 feet 8 inches. PHYSICAL EXAMINATION:     Vital signs temperature 98 degrees, pulse 84, blood pressure 108/57, respirations 18. GENERAL:  The patient is an alert man in no acute distress. WOUND EXAMINATION:  Examination of the right lower extremity revealed 2+ dorsalis pedis pulse. There was trace to 1-2+ edema in the right lower leg. There were no ulcers in the right lower leg or foot. Examination of the left lower extremity revealed a 2+ dorsalis pedis pulse. There was 1-2+ pitting edema in the left lower leg. There was one wound on the left lower leg now of minimal size, 0.2 x 0.3 x 0.1 cm. Since the patient is taking furosemide because of his leg edema, I have recommended he drink moderate amounts of fluid with meals and only small amounts of fluid between meals.   Emphasized again - intake still sounds too large.    He should contact primary MD about increasing dose of diuretic. Dressing ordered: Apply silver alginate over ulcer , ABD, two layer wrap with calamine. Use farrow wrap if wrap must be removed. Follow up in 1 week. FINAL DIAGNOSES:  Nonpressure wounds in left lower leg with fat layer exposed at site of prior abscesses, bilateral leg edema.       F89.018, R60.0     Davie Ford MD

## 2023-02-17 ENCOUNTER — HOSPITAL ENCOUNTER (OUTPATIENT)
Dept: WOUND CARE | Age: 48
Discharge: HOME | End: 2023-02-17
Payer: MEDICAID

## 2023-02-17 VITALS
RESPIRATION RATE: 18 BRPM | HEART RATE: 85 BPM | SYSTOLIC BLOOD PRESSURE: 140 MMHG | DIASTOLIC BLOOD PRESSURE: 82 MMHG | TEMPERATURE: 98.2 F

## 2023-02-17 DIAGNOSIS — L97.922 NON-PRESSURE CHRONIC ULCER OF LEFT LOWER LEG WITH FAT LAYER EXPOSED (HCC): ICD-10-CM

## 2023-02-17 DIAGNOSIS — L97.922 NON-PRESSURE CHRONIC ULCER OF LEFT LOWER LEG WITH FAT LAYER EXPOSED (HCC): Primary | ICD-10-CM

## 2023-02-17 DIAGNOSIS — R60.0 BILATERAL LEG EDEMA: ICD-10-CM

## 2023-02-17 PROCEDURE — 29581 APPL MULTLAYER CMPRN SYS LEG: CPT

## 2023-02-17 PROCEDURE — 99214 OFFICE O/P EST MOD 30 MIN: CPT | Performed by: SURGERY

## 2023-02-17 RX ORDER — TRIAMCINOLONE ACETONIDE 1 MG/G
OINTMENT TOPICAL ONCE
OUTPATIENT
Start: 2023-02-17 | End: 2023-02-17

## 2023-02-17 RX ORDER — SILVER SULFADIAZINE 10 G/1000G
CREAM TOPICAL ONCE
OUTPATIENT
Start: 2023-02-17 | End: 2023-02-17

## 2023-02-17 RX ORDER — BACITRACIN 500 [USP'U]/G
OINTMENT TOPICAL ONCE
OUTPATIENT
Start: 2023-02-17 | End: 2023-02-17

## 2023-02-17 RX ORDER — LIDOCAINE HYDROCHLORIDE 20 MG/ML
JELLY TOPICAL ONCE
OUTPATIENT
Start: 2023-02-17 | End: 2023-02-17

## 2023-02-17 RX ORDER — GENTAMICIN SULFATE 1 MG/G
OINTMENT TOPICAL ONCE
OUTPATIENT
Start: 2023-02-17 | End: 2023-02-17

## 2023-02-17 RX ORDER — MUPIROCIN 20 MG/G
OINTMENT TOPICAL ONCE
OUTPATIENT
Start: 2023-02-17 | End: 2023-02-17

## 2023-02-17 RX ORDER — LIDOCAINE 40 MG/G
CREAM TOPICAL ONCE
OUTPATIENT
Start: 2023-02-17 | End: 2023-02-17

## 2023-02-17 RX ORDER — CLOBETASOL PROPIONATE 0.5 MG/G
OINTMENT TOPICAL ONCE
OUTPATIENT
Start: 2023-02-17 | End: 2023-02-17

## 2023-02-17 RX ORDER — BACITRACIN ZINC AND POLYMYXIN B SULFATE 500; 1000 [USP'U]/G; [USP'U]/G
OINTMENT TOPICAL ONCE
OUTPATIENT
Start: 2023-02-17 | End: 2023-02-17

## 2023-02-17 RX ORDER — BETAMETHASONE DIPROPIONATE 0.5 MG/G
OINTMENT TOPICAL ONCE
OUTPATIENT
Start: 2023-02-17 | End: 2023-02-17

## 2023-02-17 RX ORDER — LIDOCAINE HYDROCHLORIDE 40 MG/ML
SOLUTION TOPICAL ONCE
OUTPATIENT
Start: 2023-02-17 | End: 2023-02-17

## 2023-02-17 NOTE — WOUND CARE
02/17/23 0843   Wound Leg lower Anterior;Distal 11/11/22   Date First Assessed/Time First Assessed: 11/11/22 0921   Wound Approximate Age at First Assessment (Weeks): 3 weeks  Primary Wound Type: (c)   Location: Leg lower  Wound Location Orientation: Anterior;Distal  Date of First Observation: 11/11/22   Dressing/Treatment Xeroform;ABD pad  (2 layer calamine wrap)   Multilayer Compression Wrap   (Not Unna) Below the Knee    NAME:  Jonah Dotson  YOB: 1975  MEDICAL RECORD NUMBER:  [id number]  DATE:  2/17/2023    Removed old Multilayer wrap if indicated and wash leg with mild soap/water. Applied moisturizing agent to dry skin as needed. Applied primary and secondary dressing as ordered. Applied multilayered dressing below the knee to left lower leg. Instructed patient/caregiver not to remove dressing and to keep it clean and dry. Instructed patient/caregiver on complications to report to provider, such as pain, numbness in toes, heavy drainage, and slippage of dressing.       Electronically signed by Gretta Frankel, RN on 2/17/2023 at 8:44 AM

## 2023-02-17 NOTE — DISCHARGE INSTRUCTIONS
Discharge Instructions [address], [address] [place] [address] [place], [id number]  Telephone: [phone]    NAME:  Raimundo Jara  YOB: 1975  MEDICAL RECORD NUMBER:  [id number]  DATE:  2/17/2023  WOUND CARE ORDERS:  Left leg wound :Cleanse with Soap and water , apply primary dressing xeroform cover with secondary dressing   abd pad . Apply 2 layers with Calamine  Pt./pcg/HH nurse to change (freq) once a week in clinic and as needed for compromise. F/U in 1 week. Untreated sleep apnea can make swelling worse. TREATMENT ORDERS:    Elevate leg(s) above the level of the heart when sitting. Avoid prolonged standing in one place. Do no get dressing/wrap wet. Follow Diet as prescribed:   [] Diet as tolerated: [] Calorie Diabetic Diet: Low carb and no Sugar [] No Added Salt:  [] Increase Protein: [x] Limit the amount of liquid you are drinking and avoid drinking in between meals           Return Appointment:  [x] Return Appointment: With DR Preston Taveras  in  1 Week(s)  [] Nurse Visit : *** days  [x] Ordered tests: bilateral venous ultrasound related to swelling and non healing wound. Electronically signed Gerardo Gong RN on 2/17/2023 at [address] [place] [address] Information: Should you experience any significant changes in your wound(s) or have questions about your wound care, please contact the [address] [place] [address] at [address] [place] [address] 8:00 am - 4:30. If you need help with your wound outside these hours and cannot wait until we are again available, contact your PCP or go to the hospital emergency room. PLEASE NOTE: IF YOU ARE UNABLE TO OBTAIN WOUND SUPPLIES, CONTINUE TO USE THE SUPPLIES YOU HAVE AVAILABLE UNTIL YOU ARE ABLE TO REACH US. IT IS MOST IMPORTANT TO KEEP THE WOUND COVERED AT ALL TIMES.      Physician Signature:_______________________    Date: ___________ Time:  ____________

## 2023-02-17 NOTE — WOUND CARE
02/17/23 0814   Wound Leg lower Anterior;Distal 11/11/22   Date First Assessed/Time First Assessed: 11/11/22 0921   Wound Approximate Age at First Assessment (Weeks): 3 weeks  Primary Wound Type: (c)   Location: Leg lower  Wound Location Orientation: Anterior;Distal  Date of First Observation: 11/11/22   Wound Image    Wound Etiology Venous   Dressing Status Old drainage noted   Cleansed Cleansed with saline   Wound Length (cm) 0.2 cm   Wound Width (cm) 0.4 cm   Wound Depth (cm) 0.2 cm   Wound Surface Area (cm^2) 0.08 cm^2   Change in Wound Size % 95.24   Wound Volume (cm^3) 0.016 cm^3   Wound Healing % 99   Wound Assessment Pink/red   Drainage Amount Scant   Drainage Description Serous   Wound Odor None   [place]-Wound/Incision Assessment Edematous   Edges Attached edges   Wound Thickness Description Full thickness   Visit Vitals  BP (!) 140/82 (BP 1 Location: Left lower arm, BP Patient Position: At rest)   Pulse 85   Temp 98.2 °F (36.8 °C)   Resp 18

## 2023-02-17 NOTE — PROGRESS NOTES
HISTORY OF PRESENT ILLNESS:  The patient is a 69-year-old man who is referred to the [address] [place] [address] regarding wounds on the left lower leg. The patient was first seen at the [address] [place] [address] on 11/11/2022. Missed all follow up appointments until 1/5/2023. The patient had three abscesses on the left lower leg and was admitted to the hospital from 10/12/2022 to 10/21/2022. He had operative incision and drainage of the abscesses. The wounds are currently being treated by home health with packing changes two times per week. The patient had not seen a physician for a time, but now has a primary care physician. The patient was hospitalized in October 2022 for acute over chronic respiratory insufficiency. He was also then found to have DVT in a posterior tibial vein in the left leg and was started on Eliquis. He has been told to continue the Eliquis for a total of 3 months. The patient does not have history of diabetes, but does have history of prediabetes with hemoglobin A1c of 6.0. He reports he is avoiding carbohydrates now. The patient does not have history of MI or coronary intervention. He does not describe anginal symptoms. He does report some dyspnea with walking a long distance. He is not short of breath at rest or when lying down. The patient's medications include furosemide 40 mg p.o. daily. This does produce a diuresis. The patient had been drinking a good deal of water. He reports decreasing fluid intake, but as of 2/9/2023 drinking 3-4 water bottles per day. As of 2/17/2023, not drinking water between meals. The patient's past medical history includes sleep apnea. He had used a Trilogy mask device at night and says this has greatly improved his sleep. Without the device, he tended to both sleepwalk and fall down while sleepwalking. Insurance removed the device when he no longer saw sleep clinic. Now has appt for sleep study in March 2023.      The patient has never smoked. Wound culture of 1/5/2023 grew multiple gram negative rods and skin manuel, reflecting colonization. No treatment given. Dressing as of 11/11/2022:  Pack wounds with silver alginate and cover with border foam dressing. Change dressings two times per week and p.r.n. Has On license of [facility]. The patient was reported by his home health nurse on 1/18/2023 to have a large fist sized swelling in his left calf. She had recommended that he go to the ER but he declined. The patient reports that he continues to have pain in the left calf as well as swelling. The patient is still presently taking his Eliquis. He had been diagnosed with a calf vein thrombus in October 2022. The patient went to the ER on 1/20/2023. Ultrasound was negative for DVT. The patient was felt to have cellulitis and given 1 week of Augmentin. He reports that the upper calf pain began to decrease about 3 days after starting the Augmentin. Pain presently is mild. Dressing as of 1/26/2023:  Apply silver alginate over ulcers , ABD, two layer wrap with calamine. Change 2 times per week by [facility] [employer]. He removed wrap because of itching 2 days before 2/17/2023 visit. Used [employer]. Reported weight 319 pounds, height 5 feet 8 inches. PHYSICAL EXAMINATION:     GENERAL:  The patient is an alert man in no acute distress. He falls asleep easily sitting in chair. WOUND EXAMINATION:  Examination of the right lower extremity revealed 2+ dorsalis pedis pulse. There was  2+ edema in the right lower leg. There were no ulcers in the right lower leg or foot. Examination of the left lower extremity revealed a 2+ dorsalis pedis pulse. There was 2+ pitting edema in the left lower leg. There was one wound on the left lower leg now of minimal size, 0.2 x 0.4 x 0.2 cm. I have ordered venous US to assess for venous insufficiency. I suspect most of his edema is systemic in origin.     He has presently untreated sleep apnea; this aggravates peripheral edema. Since the patient is taking furosemide because of his leg edema, I have recommended he drink moderate amounts of fluid with meals and only small amounts of fluid between meals. Emphasized again - intake still sounds too large. He should contact primary MD about increasing dose of diuretic. Dressing ordered: Apply Xeroform over ulcer , ABD, two layer wrap with calamine. Try to keep in place     Use farrow wrap if wrap must be removed. Follow up in 1 week. FINAL DIAGNOSES:  Nonpressure wounds in left lower leg with fat layer exposed at site of prior abscesses, bilateral leg edema.       P41.294, R60.0     Bobbi Morejon MD

## 2024-01-31 ENCOUNTER — TRANSCRIBE ORDERS (OUTPATIENT)
Facility: HOSPITAL | Age: 49
End: 2024-01-31

## 2024-01-31 DIAGNOSIS — I87.2 PERIPHERAL VENOUS INSUFFICIENCY: Primary | ICD-10-CM

## 2024-02-13 PROCEDURE — 99285 EMERGENCY DEPT VISIT HI MDM: CPT

## 2024-02-13 PROCEDURE — 93005 ELECTROCARDIOGRAM TRACING: CPT | Performed by: STUDENT IN AN ORGANIZED HEALTH CARE EDUCATION/TRAINING PROGRAM

## 2024-02-13 PROCEDURE — 96360 HYDRATION IV INFUSION INIT: CPT

## 2024-02-14 ENCOUNTER — APPOINTMENT (OUTPATIENT)
Facility: HOSPITAL | Age: 49
End: 2024-02-14
Payer: MEDICAID

## 2024-02-14 ENCOUNTER — HOSPITAL ENCOUNTER (EMERGENCY)
Facility: HOSPITAL | Age: 49
Discharge: HOME | End: 2024-02-14
Attending: STUDENT IN AN ORGANIZED HEALTH CARE EDUCATION/TRAINING PROGRAM
Payer: MEDICAID

## 2024-02-14 VITALS
SYSTOLIC BLOOD PRESSURE: 102 MMHG | OXYGEN SATURATION: 91 % | BODY MASS INDEX: 55.89 KG/M2 | RESPIRATION RATE: 22 BRPM | DIASTOLIC BLOOD PRESSURE: 69 MMHG | WEIGHT: 315 LBS | TEMPERATURE: 97.9 F | HEART RATE: 90 BPM

## 2024-02-14 DIAGNOSIS — N17.9 ACUTE KIDNEY INJURY (HCC): ICD-10-CM

## 2024-02-14 DIAGNOSIS — R07.9 CHEST PAIN, UNSPECIFIED TYPE: Primary | ICD-10-CM

## 2024-02-14 DIAGNOSIS — M79.661 PAIN OF RIGHT LOWER LEG: ICD-10-CM

## 2024-02-14 LAB
ALBUMIN SERPL-MCNC: 3.8 G/DL (ref 3.5–5)
ALBUMIN/GLOB SERPL: 0.9 (ref 1.1–2.2)
ALP SERPL-CCNC: 77 U/L (ref 45–117)
ALT SERPL-CCNC: 25 U/L (ref 12–78)
ANION GAP SERPL CALC-SCNC: 3 MMOL/L (ref 5–15)
AST SERPL-CCNC: 20 U/L (ref 15–37)
BASOPHILS # BLD: 0 K/UL (ref 0–0.1)
BASOPHILS NFR BLD: 1 % (ref 0–1)
BILIRUB SERPL-MCNC: 0.3 MG/DL (ref 0.2–1)
BUN SERPL-MCNC: 22 MG/DL (ref 6–20)
BUN/CREAT SERPL: 13 (ref 12–20)
CALCIUM SERPL-MCNC: 8.7 MG/DL (ref 8.5–10.1)
CHLORIDE SERPL-SCNC: 104 MMOL/L (ref 97–108)
CO2 SERPL-SCNC: 32 MMOL/L (ref 21–32)
COMMENT:: NORMAL
CREAT SERPL-MCNC: 1.76 MG/DL (ref 0.7–1.3)
D DIMER PPP FEU-MCNC: 0.2 MG/L FEU (ref 0–0.65)
DIFFERENTIAL METHOD BLD: ABNORMAL
EKG ATRIAL RATE: 86 BPM
EKG DIAGNOSIS: NORMAL
EKG P AXIS: 24 DEGREES
EKG P-R INTERVAL: 156 MS
EKG Q-T INTERVAL: 372 MS
EKG QRS DURATION: 90 MS
EKG QTC CALCULATION (BAZETT): 445 MS
EKG R AXIS: -75 DEGREES
EKG T AXIS: 70 DEGREES
EKG VENTRICULAR RATE: 86 BPM
EOSINOPHIL # BLD: 0.3 K/UL (ref 0–0.4)
EOSINOPHIL NFR BLD: 10 % (ref 0–7)
ERYTHROCYTE [DISTWIDTH] IN BLOOD BY AUTOMATED COUNT: 13.6 % (ref 11.5–14.5)
GLOBULIN SER CALC-MCNC: 4.3 G/DL (ref 2–4)
GLUCOSE SERPL-MCNC: 117 MG/DL (ref 65–100)
HCT VFR BLD AUTO: 48.5 % (ref 36.6–50.3)
HGB BLD-MCNC: 15 G/DL (ref 12.1–17)
IMM GRANULOCYTES # BLD AUTO: 0 K/UL (ref 0–0.04)
IMM GRANULOCYTES NFR BLD AUTO: 0 % (ref 0–0.5)
LYMPHOCYTES # BLD: 0.8 K/UL (ref 0.8–3.5)
LYMPHOCYTES NFR BLD: 30 % (ref 12–49)
MCH RBC QN AUTO: 29.8 PG (ref 26–34)
MCHC RBC AUTO-ENTMCNC: 30.9 G/DL (ref 30–36.5)
MCV RBC AUTO: 96.2 FL (ref 80–99)
MONOCYTES # BLD: 0.4 K/UL (ref 0–1)
MONOCYTES NFR BLD: 16 % (ref 5–13)
NEUTS SEG # BLD: 1.2 K/UL (ref 1.8–8)
NEUTS SEG NFR BLD: 43 % (ref 32–75)
NRBC # BLD: 0 K/UL (ref 0–0.01)
NRBC BLD-RTO: 0 PER 100 WBC
NT PRO BNP: 19 PG/ML
PLATELET # BLD AUTO: 183 K/UL (ref 150–400)
PMV BLD AUTO: 10.3 FL (ref 8.9–12.9)
POTASSIUM SERPL-SCNC: 3.6 MMOL/L (ref 3.5–5.1)
PROT SERPL-MCNC: 8.1 G/DL (ref 6.4–8.2)
RBC # BLD AUTO: 5.04 M/UL (ref 4.1–5.7)
SODIUM SERPL-SCNC: 139 MMOL/L (ref 136–145)
SPECIMEN HOLD: NORMAL
TROPONIN I SERPL HS-MCNC: 9 NG/L (ref 0–76)
WBC # BLD AUTO: 2.8 K/UL (ref 4.1–11.1)

## 2024-02-14 PROCEDURE — 85025 COMPLETE CBC W/AUTO DIFF WBC: CPT

## 2024-02-14 PROCEDURE — 84484 ASSAY OF TROPONIN QUANT: CPT

## 2024-02-14 PROCEDURE — 80053 COMPREHEN METABOLIC PANEL: CPT

## 2024-02-14 PROCEDURE — 93971 EXTREMITY STUDY: CPT

## 2024-02-14 PROCEDURE — 83880 ASSAY OF NATRIURETIC PEPTIDE: CPT

## 2024-02-14 PROCEDURE — 36415 COLL VENOUS BLD VENIPUNCTURE: CPT

## 2024-02-14 PROCEDURE — 71046 X-RAY EXAM CHEST 2 VIEWS: CPT

## 2024-02-14 PROCEDURE — 2580000003 HC RX 258: Performed by: STUDENT IN AN ORGANIZED HEALTH CARE EDUCATION/TRAINING PROGRAM

## 2024-02-14 PROCEDURE — 85379 FIBRIN DEGRADATION QUANT: CPT

## 2024-02-14 RX ORDER — TRAMADOL HYDROCHLORIDE 50 MG/1
50 TABLET ORAL EVERY 4 HOURS PRN
Qty: 8 TABLET | Refills: 0 | Status: SHIPPED | OUTPATIENT
Start: 2024-02-14 | End: 2024-02-17

## 2024-02-14 RX ORDER — 0.9 % SODIUM CHLORIDE 0.9 %
1000 INTRAVENOUS SOLUTION INTRAVENOUS ONCE
Status: COMPLETED | OUTPATIENT
Start: 2024-02-14 | End: 2024-02-14

## 2024-02-14 RX ADMIN — SODIUM CHLORIDE 1000 ML: 9 INJECTION, SOLUTION INTRAVENOUS at 01:18

## 2024-02-14 NOTE — ED NOTES
11:59 PM  I have evaluated the patient as the Provider in Rapid Medical Evaluation (RME). I have reviewed his vital signs and the triage nurse assessment. I have talked with the patient and any available family and advised that I am the provider in triage and have ordered the appropriate study to initiate their work up based on the clinical presentation during my assessment. I have advised that the patient will be accommodated in the Main ED as soon as possible. I have also requested to contact the triage nurse or myself immediately if the patient experiences any changes in their condition during this brief waiting period.    48-year-old male presents to ED with 1 week of worsening right leg swelling and 1 day of chest pain and shortness of breath.  Patient reports that he has had leg swelling for \"quite a while now\" but over the past week the right leg has acutely worsened with associated pain.  [facility] and they were treating him for cellulitis but he has not ruled out a blood clot he is concerned about this as he had to have surgery on his left leg due to this.  He reports he used to be on Eliquis but is no longer on this.  He also notes tonight he developed chest pain and shortness of breath.  He attributes this to his anxiety about his leg pain but is also concerned for his heart.    LEROY SON Ashley, PA  02/14/24 0000

## 2024-02-14 NOTE — ED PROVIDER NOTES
[facility] EMERGENCY DEP  EMERGENCY DEPARTMENT ENCOUNTER      Pt Name: Edison Rodriguez  MRN: [id number]  Birthdate 1975  Date of evaluation: 2/13/2024  Provider: Tian Valverde MD    CHIEF COMPLAINT       Chief Complaint   Patient presents with    Chest Pain    Shortness of Breath         HISTORY OF PRESENT ILLNESS   48-year-old male with history significant for sleep apnea, lower extremity edema presents to the ED with chief complaint of right leg pain and swelling for the past 2 to 3 weeks.  Patient seen previously for this, diagnosed with cellulitis and given Keflex but says that he has had no change in his symptoms despite completing prescription.  He says pain feels deeper.  Today he started having some chest pain, denies any shortness of breath.  He denies any history of blood clots but says that he was on Eliquis for 6 months after a left leg surgery but says this was only a precaution.  No longer on Eliquis.  No fevers, chills, abdominal pain, urinary symptoms, bowel symptoms.    The history is provided by the patient.       Review of External Medical Records:     Nursing Notes were reviewed.    REVIEW OF SYSTEMS       Review of Systems   Respiratory:  Negative for shortness of breath.    Cardiovascular:  Positive for chest pain.       Except as noted above the remainder of the review of systems was reviewed and negative.       PAST MEDICAL HISTORY     Past Medical History:   Diagnosis Date    Sleep apnea 2018         SURGICAL HISTORY     No past surgical history on file.      CURRENT MEDICATIONS       Previous Medications    ACETAMINOPHEN (TYLENOL) 325 MG TABLET    Take by mouth every 6 hours as needed    APIXABAN STARTER PACK (ELIQUIS) 5 MG TBPK TABLET    10 mg bid for one week 5mg bid after one week    CARVEDILOL (COREG) 25 MG TABLET    Take by mouth 2 times daily (with meals)    FUROSEMIDE (LASIX) 40 MG TABLET    Take by mouth daily       ALLERGIES     Patient has no known allergies.    FAMILY HISTORY

## 2024-02-14 NOTE — ED TRIAGE NOTES
He reports right leg pain that has been \"going on for some time\". He says tonight his chest started to hurt more to the right. He says he has a little shortness of breath. But he says the most of the pain is in his right leg.

## 2024-02-14 NOTE — DISCHARGE INSTRUCTIONS
As a follow-up, a second attempt has been made for outreach via telephone call, please see Contacts section for details      Thank you  Paresh Mayberry MA
Your creatinine which measures kidney function is 1.7 today.  It is not clear whether this is a new change or whether this is something that is gradually happened over the past year.  Please follow-up with your primary care doctor soon as possible.  Please do not take any NSAIDs like ibuprofen (Motrin or Advil), or naproxen (Aleve).  You can take Tylenol for your pain as well as the prescribed medication.  
No

## 2025-05-19 NOTE — PROGRESS NOTES
Diastolic elevated again today, asymptomatic   She would like to let Cardiology handle this rather than us   Continue on same medications  She has a follow up in six-months   PULMONARY MEDICINE    Progress Note    Name: Chema Zuniga   : 1975   MRN: [id number]   Date: 10/20/2022      Subjective:     10/20  Getting wound therapy  Breathing stable   Trilogy is getting set up     10/19  Breathing stable today    Latest Reference Range & Units 10/13/22 05:11   pH (POC) 7.35 - 7.45   7.33 (L)   pCO2 (POC) 35.0 - 45.0 MMHG 57.7 (H)   pO2 (POC) 80 - 100 MMHG 148 (H)   HCO3 (POC) 22 - 26 MMOL/L 30.3 (H)   sO2 (POC) 92 - 97 % 99.1 (H)   Base excess (POC) mmol/L 2.8   FIO2 (POC) % 40   Specimen type (POC) -   ARTERIAL   (L): Data is abnormally low  (H): Data is abnormally high    10/18  Chest film yesterday showed nothing acute   Was off O2 this am siting up and had saturations maintaining. No over dyspnea at rest, + dyspnea with ambulation     10/17  O2 requirements up but he feels ok  Intermittent compliant with NIV   ECHO from 10/14  Result status: Final result       Left Ventricle: Normal left ventricular systolic function with a visually estimated EF of 60 - 65%. Left ventricle size is normal. Mildly increased wall thickness. Normal wall motion. Normal diastolic function. 10/14  Significant apnea while when I entered the room. He continues to refuse his NIV  ECHO pending     Consult Note: 10/13  Requesting Physician: Dr. Prakash Espinosa  Reason for consult: Acute on chronic hypercapnic respiratory failure    Medical records and data reviewed. Patient is a 56 y.o. male who presented to the hospital with bilateral leg pain, left more than right. This has been worsening for the past 7 days. This was not associated with fever. He was noted to be lethargic on arrival and ABG had suggested partially compensated hypercarbic respiratory failure with hypoxia. We are asked to see him in consultation. History is limited from the patient who is falling asleep in the middle of a conversation.   When aroused he does say he is feeling a little better than the time of admission and shortness of breath improves in a recliner in weight he is right now. He managed to awaken enough to say that he has been diagnosed with sleep apnea in the past but has been noncompliant with CPAP. Review of medical records and the [facility] system shows that he underwent a diagnostic polysomnogram with apnea-hypopnea index of 40 events per hour has been followed by Dr. Fortino Marinelli and [facility] sleep clinic. Last contact was in February 2021. He had been prescribed AutoPap at 5 to 15 cm of water pressure set up on December 2020. He also has had refractory pedal edema. Venous ultrasound on admission did not show DVT. Last echocardiogram was several years ago and had not shown pulmonary hypertension for which he has is at increased risk given untreated sleep apnea. In the hospital he has had poorly controlled systemic hypertension as well. He is receiving antibiotics for possible cellulitis. He has been a smoker in the past.  CT angiogram of the chest had not suggested presence of pulmonary pulm parenchymal or pleural abnormality, no pulmonary emboli was identified    Review of Systems:     A comprehensive 12 system review of systems was limited from the patient    Assessment:   Acute on chronic hypercarbic respiratory failure, respiratory acidosis is partially compensated  Restrictive lung disease and obesity hypoventilation syndrome  Bilateral lower extremity edema, at risk for pulmonary hypertension  Lower extremity cellulitis  THC smoker   Uncontrolled systemic hypertension  Unknown FEV1  Other medical problems per chart    Recommendations:   He has difficulty tolerating BiPAP (including S/T) and CPAP is not indicated in the treatment for hypercapnia. We had a long discussed today of the importance of NIV . He is in agreement to do something.  He does not want to follow up with  sleep clinic so we will set him up on trilogy in the form of AVAPS-AE, the preferred choice of ventilation for the chronic management of hypercapnia  Settings:   PSmax: 59vri0l   PSmin: 4cmh2o   EPAPmax: 26yih4h   EPAPmin: 6cmh2o   Auto back up rate. On diuretics  Receiving antibiotics  O2 titration above 90%. Wean O2 as able  We will need to look into PFTs following discharge   Possible d/c soon     Active Problem List:     Problem List  Date Reviewed: 2/22/2021            Codes Class    Acute respiratory failure with hypoxia and hypercapnia (HCC) ICD-10-CM: J96.01, J96.02  ICD-9-CM: 518.81         Cellulitis ICD-10-CM: L03.90  ICD-9-CM: 682.9        Past Medical History:      has a past medical history of Sleep apnea (2018). Past Surgical History:      has no past surgical history on file. Home Medications:     Prior to Admission medications    Medication Sig Start Date End Date Taking? Authorizing Provider   acetaminophen (TYLENOL) 325 mg tablet Take 2 Tablets by mouth every six (6) hours as needed for Fever. 10/4/22   MARLENE Oshea   cetirizine (ZyrTEC) 10 mg tablet Take 1 Tablet by mouth daily. 10/4/22   MARLENE Oshea   gabapentin (NEURONTIN) 600 mg tablet TAKE 1 CAPSULE FOR 2 DAYS, THEN 2 CAPS AT BEDTIME FOR 2 DAYS, THEN 1 IN MORNING AND 2 IN EVENING  Patient not taking: Reported on 10/4/2022 8/3/20   Provider, Historical   furosemide (LASIX) 40 mg tablet Take 1 Tab by mouth daily.   Patient not taking: Reported on 10/4/2022 12/20/19   Roxy Whitten MD       Allergies/Social/Family History:     No Known Allergies   Social History     Tobacco Use    Smoking status: Former     Packs/day: 0.50     Years: 5.00     Pack years: 2.50     Types: Cigarettes    Smokeless tobacco: Never   Substance Use Topics    Alcohol use: No      Family History   Problem Relation Age of Onset    Diabetes Mother             Objective:   Vital Signs:  Visit Vitals  /86   Pulse 91   Temp 98.1 °F (36.7 °C)   Resp 15   Ht 5' 8\" (1.727 m)   Wt 145.2 kg (320 lb)   SpO2 98%   BMI 48.66 kg/m²    O2 Flow Rate (L/min): 6 l/min O2 Device: BIPAP Temp (24hrs), Av.6 °F (37 °C), Min:97.8 °F (36.6 °C), Max:99.9 °F (37.7 °C)           Intake/Output:     Intake/Output Summary (Last 24 hours) at 10/20/2022 1040  Last data filed at 10/20/2022 0810  Gross per 24 hour   Intake --   Output 1450 ml   Net -1450 ml         Physical Exam:   General:  Drowsy, no distress, appears stated age. Head:  Normocephalic, without obvious abnormality, atraumatic. Eyes:  Conjunctivae/corneas clear. PERRL   Neck: Supple, symmetrical, no adenopathy, no carotid bruit and no JVD. Lungs:   Diminished breath sounds   Chest wall:  No tenderness or deformity. Heart:  Regular rate and rhythm, S1-S2 normal, no murmur, no click, rub or gallop. Abdomen:   Soft, non-tender. Bowel sounds present. No masses,  No organomegaly. Extremities: Atraumatic, no cyanosis, bilateral 4+ edema   Pulses: Palpable   Skin: No rashes or lesions   Neurologic: Grossly nonfocal        LABS AND  DATA: Personally reviewed  Recent Labs     10/19/22  0613 10/18/22  0530   WBC 7.8 8.1   HGB 11.8* 12.4   HCT 37.9 40.2    326       Recent Labs     10/20/22  0448 10/19/22  0613 10/18/22  0530   * 134* 136   K 4.4 4.4 4.6   CL 99 98 99   CO2 33* 31 32   BUN 13 15 11   CREA 1.15 1.01 1.00   * 101* 96   CA 9.4 9.3 9.3   MG  --  2.4 2.5*   PHOS  --  3.8 3.9       Recent Labs     10/18/22  0530   AP 56   TP 7.2   ALB 2.9*   GLOB 4.3*       No results for input(s): INR, PTP, APTT, INREXT, INREXT in the last 72 hours. No results for input(s): PHI, PCO2I, PO2I, FIO2I in the last 72 hours. No results for input(s): CPK, CKMB, TROIQ, BNPP in the last 72 hours.       MEDS: Reviewed  Current Facility-Administered Medications   Medication Dose Route Frequency    LORazepam (ATIVAN) tablet 1 mg  1 mg Oral QHS PRN    polyethylene glycol (MIRALAX) packet 17 g  17 g Oral DAILY    vancomycin (VANCOCIN) 1,250 mg in 0.9% sodium chloride 250 mL ([id number])  1,250 mg IntraVENous Q8H    furosemide (LASIX) injection 40 mg  40 mg IntraVENous DAILY    HYDROmorphone (DILAUDID) injection 1 mg  1 mg IntraVENous Q4H PRN    carvediloL (COREG) tablet 25 mg  25 mg Oral BID WITH MEALS    sodium chloride (NS) flush 5-40 mL  5-40 mL IntraVENous Q8H    sodium chloride (NS) flush 5-40 mL  5-40 mL IntraVENous PRN    acetaminophen (TYLENOL) tablet 650 mg  650 mg Oral Q6H PRN    Or    acetaminophen (TYLENOL) suppository 650 mg  650 mg Rectal Q6H PRN    ondansetron (ZOFRAN ODT) tablet 4 mg  4 mg Oral Q8H PRN    Or    ondansetron (ZOFRAN) injection 4 mg  4 mg IntraVENous Q6H PRN    enoxaparin (LOVENOX) injection 30 mg  30 mg SubCUTAneous Q12H    oxyCODONE-acetaminophen (PERCOCET 10)  mg per tablet 1 Tablet  1 Tablet Oral Q4H PRN    vancomycin - pharmacy to dose   Other Rx Dosing/Monitoring    hydrALAZINE (APRESOLINE) 20 mg/mL injection 10 mg  10 mg IntraVENous Q6H PRN       Chest Imaging: personally reviewed and report checked  Results from Hospital Encounter encounter on 10/12/22    XR CHEST PORT    Narrative  EXAM:  XR CHEST PORT    INDICATION:   dyspnea    COMPARISON: Chest radiograph 10/12/2022. FINDINGS: AP radiograph of the chest was obtained. No evidence of focal consolidation. No pleural effusion or pneumothorax. Heart,  fannie, mediastinum are within normal limits. No acute osseous abnormalities. Impression  No acute cardiopulmonary process. Results from [place] encounter on 10/12/22    CT LOW EXT LT W CONT    Narrative  EXAM: CT LOW EXT LT W CONT    INDICATION: Left leg swelling, rule out abscess, compartment    COMPARISON: Left lower extremity venous duplex 7/15/2018    CONTRAST: 100 mL of Isovue-300. TECHNIQUE: Helical CT of the left lower extremity (from the distal femur through  the toes) during uneventful rapid bolus intravenous contrast administration. Coronal and Sagittal reformats were generated. Images reviewed in soft tissue  and bone windows.  CT dose reduction was achieved through the use of a  standardized protocol tailored for this examination and automatic exposure  control for dose modulation. FINDINGS: Bones: Normal bone mineralization. No fracture, dislocation, or  periosteal reaction. Joint fluid: None. Articulations: Mild osteoarthritis involving the knee, ankle, and foot. Tendons: No full-thickness tendon tear. Muscles: No intramuscular hematoma. No focal atrophy. Soft tissues: Diffuse circumferential skin thickening and subcutaneous fat  stranding/edema and swelling in the lower leg, compatible with cellulitis. No  focal fluid collection to suggest abscess. 16 mm x 7 mm x 5 mm focus of nodular  cutaneous enhancement at the medial hindfoot ([phone], [phone]). Impression  Lower leg cellulitis. No abscess. Focal nodular cutaneous enhancing focus in the  medial hindfoot, correlate clinically. Tele- reviewed    Medical decision making:   I have reviewed the flowsheet and previous day's notes  Patient has acute or chronic illness that poses a threat to life or bodily function  Review and order of Clinical lab tests  Review and Order of Radiology tests  Independent visualization of Image  Reviewed Oxygen/ NiPPV  High Risk Drug therapy requiring intensive monitoring for toxicity: eg steroids, pressors, antibiotics        Thank you for allowing me to participate in this patient's care.     Anson Washington PA-C      [facility]